# Patient Record
Sex: MALE | Race: BLACK OR AFRICAN AMERICAN | NOT HISPANIC OR LATINO | Employment: OTHER | URBAN - METROPOLITAN AREA
[De-identification: names, ages, dates, MRNs, and addresses within clinical notes are randomized per-mention and may not be internally consistent; named-entity substitution may affect disease eponyms.]

---

## 2017-01-29 ENCOUNTER — HOSPITAL ENCOUNTER (EMERGENCY)
Facility: HOSPITAL | Age: 32
Discharge: HOME | End: 2017-01-30
Attending: EMERGENCY MEDICINE
Payer: MEDICARE

## 2017-01-29 DIAGNOSIS — H57.89 IRRITATION OF LEFT EYE: Primary | ICD-10-CM

## 2017-01-29 PROCEDURE — 99283 EMERGENCY DEPT VISIT LOW MDM: CPT

## 2017-01-29 PROCEDURE — 25000003 PHARM REV CODE 250: Performed by: EMERGENCY MEDICINE

## 2017-01-29 PROCEDURE — 25000003 PHARM REV CODE 250: Performed by: PHYSICIAN ASSISTANT

## 2017-01-29 RX ORDER — DIVALPROEX SODIUM 250 MG/1
500 TABLET, FILM COATED, EXTENDED RELEASE ORAL DAILY
COMMUNITY
End: 2018-12-02

## 2017-01-29 RX ORDER — ERYTHROMYCIN 5 MG/G
OINTMENT OPHTHALMIC
Qty: 1 TUBE | Refills: 0 | Status: SHIPPED | OUTPATIENT
Start: 2017-01-29 | End: 2017-06-26

## 2017-01-29 RX ORDER — ERYTHROMYCIN 5 MG/G
OINTMENT OPHTHALMIC
Status: COMPLETED | OUTPATIENT
Start: 2017-01-30 | End: 2017-01-30

## 2017-01-29 RX ORDER — GABAPENTIN 300 MG/1
300 CAPSULE ORAL 4 TIMES DAILY
COMMUNITY
End: 2017-06-26

## 2017-01-29 RX ORDER — LISDEXAMFETAMINE DIMESYLATE 50 MG/1
50 CAPSULE ORAL EVERY MORNING
COMMUNITY
End: 2018-12-02

## 2017-01-29 RX ORDER — TETRACAINE HYDROCHLORIDE 5 MG/ML
2 SOLUTION OPHTHALMIC
Status: COMPLETED | OUTPATIENT
Start: 2017-01-29 | End: 2017-01-29

## 2017-01-29 RX ORDER — FLUOXETINE HYDROCHLORIDE 20 MG/1
20 CAPSULE ORAL DAILY
COMMUNITY
End: 2017-06-26

## 2017-01-29 RX ORDER — CETIRIZINE HYDROCHLORIDE 10 MG/1
10 TABLET, CHEWABLE ORAL DAILY
COMMUNITY
End: 2018-12-02

## 2017-01-29 RX ORDER — QUETIAPINE FUMARATE 50 MG/1
50 TABLET, FILM COATED ORAL NIGHTLY
COMMUNITY
End: 2017-06-26

## 2017-01-29 RX ORDER — HYDROCODONE BITARTRATE AND ACETAMINOPHEN 10; 325 MG/1; MG/1
TABLET ORAL
COMMUNITY
End: 2017-06-26

## 2017-01-29 RX ORDER — FLUVOXAMINE MALEATE 100 MG/1
100 TABLET, COATED ORAL NIGHTLY
COMMUNITY
End: 2017-06-26

## 2017-01-29 RX ADMIN — FLUORESCEIN SODIUM 1 STRIP: 1 STRIP OPHTHALMIC at 10:01

## 2017-01-29 RX ADMIN — TETRACAINE HYDROCHLORIDE 2 DROP: 5 SOLUTION OPHTHALMIC at 11:01

## 2017-01-29 NOTE — ED AVS SNAPSHOT
[facility]  [place] [address]               Buddy Francois   2017 10:20 PM   ED    Description:  Male : 1985   Department:  [facility]           Your Care was Coordinated By:     Provider Role From To    Miki Benito III, MD Attending Provider 17 --    Samuel Smith PA-C Physician Assistant 17 --      Reason for Visit     Foreign Body in Eye           Diagnoses this Visit        Comments    Irritation of left eye    -  Primary       ED Disposition     None           To Do List           Follow-up Information     Follow up with Navin Carvajal MD. Schedule an appointment as soon as possible for a visit in 1 day.    Specialty:  Ophthalmology    Why:  For further evaluation    Contact information:    [place] [address]  [phone]          Go to [facility].    Specialty:  Emergency Medicine    Why:  If symptoms worsen    Contact information:    [place] [id number] [phone]       These Medications        Disp Refills Start End    erythromycin (ROMYCIN) ophthalmic ointment 1 Tube 0 2017     Place a 1/2 inch ribbon of ointment into the lower eyelid 2-3 times daily.      [facility]     [facility] Care [facility] - 24/7 Assistance  Registered nurses in the [facility] provide clinical advisement, health education, appointment booking, and other advisory services.  Call for this free service at [phone].             Medications           Message regarding Medications     Verify the changes and/or additions to your medication regime listed below are the same as discussed with your clinician today.  If any of these changes or additions are incorrect, please notify your healthcare provider.        START taking these NEW medications        Refills    erythromycin (ROMYCIN) ophthalmic ointment 0     Sig: Place a 1/2 inch ribbon of ointment into the lower eyelid 2-3 times daily.    Class: Print      These medications were administered today        Dose Freq    fluorescein ophthalmic strip 1 strip 1 strip ED 1 Time    Sig: Place 1 strip into the left eye ED 1 Time.    Class: Normal    Route: Left Eye    tetracaine HCl (PF) 0.5 % Drop 2 drop 2 drop ED 1 Time    Sig: Place 2 drops into the left eye ED 1 Time.    Class: Normal    Route: Left Eye    erythromycin 5 mg/gram (0.5 %) ophthalmic ointment  ED 1 Time    Starting on: 1/30/2017    Sig: Place into the left eye ED 1 Time.    Class: Normal    Route: Left Eye           Verify that the below list of medications is an accurate representation of the medications you are currently taking.  If none reported, the list may be blank. If incorrect, please contact your healthcare provider. Carry this list with you in case of emergency.           Current Medications     cetirizine 10 mg chewable tablet Take 10 mg by mouth once daily.    divalproex ER (DEPAKOTE ER) 250 MG 24 hr tablet Take 500 mg by mouth once daily.    fluoxetine (PROZAC) 20 MG capsule Take 20 mg by mouth once daily.    fluvoxaMINE (LUVOX) 100 MG tablet Take 100 mg by mouth every evening.    gabapentin (NEURONTIN) 300 MG capsule Take 300 mg by mouth 4 (four) times daily.    hydrocodone-acetaminophen 10-325mg (NORCO)  mg Tab Take by mouth.    lisdexamfetamine (VYVANSE) 50 MG capsule Take 50 mg by mouth every morning.    quetiapine (SEROQUEL) 50 MG tablet Take 50 mg by mouth every evening.    trazodone (DESYREL) 50 MG tablet Take 50 mg by mouth every evening.    erythromycin (ROMYCIN) ophthalmic ointment Place a 1/2 inch ribbon of ointment into the lower eyelid 2-3 times daily.    erythromycin 5 mg/gram (0.5 %) ophthalmic ointment Starting on Jan 30, 2017. Place into the left eye ED 1 Time.    meloxicam (MOBIC) 7.5 MG tablet Take 7.5 mg by mouth once daily.           Clinical Reference Information     "       Your Vitals Were     BP Pulse Temp Resp Height Weight    157/67 95 98.5 °F (36.9 °C) (Oral) 18 6' 1" (1.854 m) 95.3 kg (210 lb)    SpO2 BMI             97% 27.71 kg/m2         Allergies as of 1/29/2017        Reactions    Flexeril [Cyclobenzaprine] Hives      Immunizations Administered on Date of Encounter - 1/29/2017     None      ED Micro, Lab, POCT     None      ED Imaging Orders     None      Discharge References/Attachments     CONJUNCTIVAL FOREIGN BODY, RESOLVED (ENGLISH)      [facility] Sign-Up     Activating your [facility] account is as easy as 1-2-3!     1) Visit [email or web address], select Sign Up Now, enter this activation code and your date of birth, then select Next.  [email or web address]  Expires: 3/15/2017 11:53 PM      2) Create a username and password to use when you visit [facility] in the future and select a security question in case you lose your password and select Next.    3) Enter your e-mail address and click Sign Up!    Additional Information  If you have questions, please e-mail [email or web address] or call [phone] to talk to our [facility] staff. Remember, [facility] is NOT to be used for urgent needs. For medical emergencies, dial 911.         Smoking Cessation     If you would like to quit smoking:   You may be eligible for free services if you are a [place] resident and started smoking cigarettes before September 1, 1988.  Call the [employer]oking [employer]ess[employer] ([employer]) toll free at (910) 358-0668 or ([phone] 759-0105.   Call 4-796-QUIT-NOW if you do not meet the above criteria.             [facility] complies with applicable Federal civil rights laws and does not discriminate on the basis of race, color, national origin, age, disability, or sex.        Language Assistance Services     ATTENTION: Language assistance services are available, free of charge. Please call [phone].      ATENCIÓN: Si habla español, tiene a gambino disposición servicios gratuitos de asistencia " lingüística. [facility] [phone].     RONAK Ý: N?u b?n nói Ti?ng Vi?t, có các d?ch v? h? tr? ngôn ng? mi?n phí dành cho b?n. G?i s? [phone].

## 2017-01-30 VITALS
OXYGEN SATURATION: 97 % | BODY MASS INDEX: 27.83 KG/M2 | WEIGHT: 210 LBS | SYSTOLIC BLOOD PRESSURE: 132 MMHG | HEART RATE: 77 BPM | TEMPERATURE: 98 F | RESPIRATION RATE: 16 BRPM | DIASTOLIC BLOOD PRESSURE: 74 MMHG | HEIGHT: 73 IN

## 2017-01-30 PROCEDURE — 25000003 PHARM REV CODE 250: Performed by: PHYSICIAN ASSISTANT

## 2017-01-30 RX ADMIN — ERYTHROMYCIN 0.5 INCH: 5 OINTMENT OPHTHALMIC at 12:01

## 2017-01-30 NOTE — ED PROVIDER NOTES
"Encounter Date: 1/29/2017    SCRIBE #1 NOTE: I, Parth Dodge, am scribing for, and in the presence of,  ROBYN Bustamante. I have scribed the following portions of the note - Other sections scribed: ROS, HPI.       History     Chief Complaint   Patient presents with    Foreign Body in Eye     x 3 days feels like something is in left eye     Review of patient's allergies indicates:   Allergen Reactions    Flexeril [cyclobenzaprine] Hives     HPI Comments: CC: Eye pain    HPI: This 31 y.o. M, who has a past medical history of ADHD (attention deficit hyperactivity disorder); Anxiety; Chronic back pain; Depression; Insomnia; Migraine headache; OCD (obsessive compulsive disorder); and Seasonal allergies, presents to the ED for evaluation of left eye pain since Friday. Symptoms were worse Saturday with swelling around the eye and clear drainage. He recalls a "piece of dust" entering his eye Friday. He has associated pain to the bottom eye lid with blinking. He notes associated photophobia, blurry vision and left sided headache. He treated with visine drops. No hx of eye issues. He denies nausea, vomiting, diarrhea, chest pain, shortness of breath, sore throat, rhinorrhea and cough. Doesn't wear contacts.     The history is provided by the patient.     Past Medical History   Diagnosis Date    ADHD (attention deficit hyperactivity disorder)     Anxiety     Chronic back pain     Depression     Insomnia     Migraine headache     OCD (obsessive compulsive disorder)     Seasonal allergies      No past medical history pertinent negatives.  Past Surgical History   Procedure Laterality Date    Knee arthroscopy      Patella realignment      Circumcision       Family History   Problem Relation Age of Onset    Hypertension Father     Hypertension Paternal Grandfather      Social History   Substance Use Topics    Smoking status: Former Smoker    Smokeless tobacco: Never Used    Alcohol use Yes      Comment: social "     Review of Systems   Constitutional: Negative for fever.   HENT: Negative for sore throat.    Eyes: Positive for photophobia, pain (left), discharge, redness and visual disturbance.   Respiratory: Negative for cough and shortness of breath.    Cardiovascular: Negative for chest pain.   Gastrointestinal: Negative for abdominal pain, nausea and vomiting.   Genitourinary: Negative for dysuria.   Musculoskeletal: Negative for neck pain.   Skin: Negative for rash and wound.   Neurological: Positive for headaches (left sided). Negative for dizziness.       Physical Exam   Initial Vitals   BP Pulse Resp Temp SpO2   01/29/17 2104 01/29/17 2104 01/29/17 2104 01/29/17 2104 01/29/17 2104   157/67 95 18 98.5 °F (36.9 °C) 97 %     Physical Exam    Nursing note and vitals reviewed.  Constitutional: He appears well-developed and well-nourished. He is not diaphoretic. No distress.   HENT:   Head: Normocephalic and atraumatic.   Nose: Nose normal.   Eyes:   Visual Acuity:  R 20/20; L 20/40; Both 20/20.     No contact lenses noted. No periorbital ecchymosis, lacerations, abrasions, warmth, erythema, or tenderness to palpation. Clear tearing. Able to fully open eyelids with extraocular movements intact in all directions. Pupils equal and reactive to light with direct and consensual light reflexes- no photophobia with consensual, but present with direct. Negative Cheyanne's sign with fluorescein staining. Also, no evidence of corneal abrasion, conjunctival abrasion, or dendritic lesion. No foreign body. No hyphema or subconjunctival hemorrhage. Prominent conjunctival injection, but no ciliary flush.   Neck: Normal range of motion. No tracheal deviation present. No JVD present.   Cardiovascular: Normal rate, regular rhythm and normal heart sounds. Exam reveals no friction rub.    No murmur heard.  Pulmonary/Chest: Breath sounds normal. No stridor. No respiratory distress. He has no wheezes. He has no rhonchi. He has no rales. He  exhibits no tenderness.   Musculoskeletal: Normal range of motion.   Neurological: He is alert and oriented to person, place, and time.   Skin: Skin is warm and dry. No rash and no abscess noted. No erythema. No pallor.         ED Course   Procedures  Labs Reviewed - No data to display          Medical Decision Making:   History:   Old Medical Records: I decided to obtain old medical records.      This is an emergent evaluation of a 31 y.o. male with no pertinent PMHx presenting to the ED for eye pain. Denies use of contact lenses, trauma, and fever. /67, vitals otherwise WNL, afebrile. Patient is non-toxic appearing and in no acute distress. No significant decrease in vision based on visual acuity. I suspect patient had dust particulate or other foreign body in the eye that has since been removed, causing general irritation of the eye. No evidence of occular or periocular trauma to suggest orbital compartment syndrome. No Cheyanne's sign or teardrop pupil to suggest open globe injury. No hyphema or subconjunctival hemorrhage. No blepharospasm, ischemia of the conjunctiva, or Hx to suggest ocular chemical exposure. No evidence of corneal or conjunctival abrasion or foreign body currently. No ciliary flush or pain with consensual light reflex to suggest iritis. No evidence of dendritic lesions. I doubt acute angle closure glaucoma. I doubt bacterial conjunctivitis but will cover empirically. No periorbital swelling, warmth, erythema, or tenderness to palpation to suggest periorbital cellulitis.     Patient given erythromycin ointment in ED. Discharged home with erythromycin ointment. Instructed to follow up with Dr. Carvajal for further evaluation and management of symptoms.     I discussed with the patient the diagnosis, treatment plan, indications for return to the emergency department, and for expected follow-up. The patient verbalized an understanding. The patient is asked if there are any questions or  concerns. We discuss the case, until all issues are addressed to the patients satisfaction. Patient understands and is agreeable to the plan.     I discussed this patient with Dr. Benito who is in agreement with my assessment and plan.          Scribe Attestation:   Scribe #1: I performed the above scribed service and the documentation accurately describes the services I performed. I attest to the accuracy of the note.    Attending Attestation:     Physician Attestation Statement for NP/PA:   I discussed this assessment and plan of this patient with the NP/PA, but I did not personally examine the patient. The face to face encounter was performed by the NP/PA.    Other NP/PA Attestation Additions:      Medical Decision Makin-year-old male presents for evaluation of left eye pain.  Physical examination does not reveal evidence of corneal abrasion, foreign body, significant vision abnormality, iritis, or ruptured globe. Agree with above assessment and plan.       Physician Attestation for Scribe:  Physician Attestation Statement for Scribe #1: I, ROBYN Bustamante, reviewed documentation, as scribed by Parth Dodge in my presence, and it is both accurate and complete.                 ED Course     Clinical Impression:   The encounter diagnosis was Irritation of left eye.    Disposition:   Disposition: Discharged  Condition: Stable       Samuel Smith PA-C  17 0100       Miki Benito III, MD  17 0906

## 2017-01-30 NOTE — ED TRIAGE NOTES
"Pt complains of left eye pain for 2 days. Yesterday got red and started eye drops. He reports he was outside on Friday and dust blew in his eye. Last night started draining and today for last 2 hours his eye is burning and light sensitive. No fevers. Complains of "bad headache" right now.  "

## 2017-06-26 ENCOUNTER — HOSPITAL ENCOUNTER (EMERGENCY)
Facility: HOSPITAL | Age: 32
Discharge: HOME | End: 2017-06-26
Attending: EMERGENCY MEDICINE
Payer: MEDICARE

## 2017-06-26 VITALS
BODY MASS INDEX: 28.49 KG/M2 | HEART RATE: 100 BPM | SYSTOLIC BLOOD PRESSURE: 140 MMHG | WEIGHT: 215 LBS | RESPIRATION RATE: 18 BRPM | OXYGEN SATURATION: 100 % | TEMPERATURE: 99 F | HEIGHT: 73 IN | DIASTOLIC BLOOD PRESSURE: 87 MMHG

## 2017-06-26 DIAGNOSIS — L02.01 FACIAL ABSCESS: Primary | ICD-10-CM

## 2017-06-26 DIAGNOSIS — L03.90 CELLULITIS, UNSPECIFIED CELLULITIS SITE: ICD-10-CM

## 2017-06-26 PROCEDURE — 25000003 PHARM REV CODE 250: Performed by: EMERGENCY MEDICINE

## 2017-06-26 PROCEDURE — 99283 EMERGENCY DEPT VISIT LOW MDM: CPT | Mod: 25

## 2017-06-26 PROCEDURE — 10060 I&D ABSCESS SIMPLE/SINGLE: CPT

## 2017-06-26 RX ORDER — KETOROLAC TROMETHAMINE 10 MG/1
10 TABLET, FILM COATED ORAL
Status: COMPLETED | OUTPATIENT
Start: 2017-06-26 | End: 2017-06-26

## 2017-06-26 RX ORDER — SULFAMETHOXAZOLE AND TRIMETHOPRIM 800; 160 MG/1; MG/1
1 TABLET ORAL
Status: COMPLETED | OUTPATIENT
Start: 2017-06-26 | End: 2017-06-26

## 2017-06-26 RX ORDER — SULFAMETHOXAZOLE AND TRIMETHOPRIM 800; 160 MG/1; MG/1
1 TABLET ORAL 2 TIMES DAILY
Qty: 14 TABLET | Refills: 0 | Status: SHIPPED | OUTPATIENT
Start: 2017-06-26 | End: 2017-07-03

## 2017-06-26 RX ORDER — HYDROCODONE BITARTRATE AND ACETAMINOPHEN 10; 325 MG/1; MG/1
1 TABLET ORAL EVERY 6 HOURS PRN
Qty: 10 TABLET | Refills: 0 | Status: SHIPPED | OUTPATIENT
Start: 2017-06-26 | End: 2018-12-02

## 2017-06-26 RX ORDER — LIDOCAINE HYDROCHLORIDE 20 MG/ML
5 INJECTION, SOLUTION INFILTRATION; PERINEURAL
Status: COMPLETED | OUTPATIENT
Start: 2017-06-26 | End: 2017-06-26

## 2017-06-26 RX ORDER — ONDANSETRON 4 MG/1
4 TABLET, ORALLY DISINTEGRATING ORAL
Status: COMPLETED | OUTPATIENT
Start: 2017-06-26 | End: 2017-06-26

## 2017-06-26 RX ORDER — HYDROCODONE BITARTRATE AND ACETAMINOPHEN 5; 325 MG/1; MG/1
2 TABLET ORAL
Status: COMPLETED | OUTPATIENT
Start: 2017-06-26 | End: 2017-06-26

## 2017-06-26 RX ADMIN — KETOROLAC TROMETHAMINE 10 MG: 10 TABLET, FILM COATED ORAL at 03:06

## 2017-06-26 RX ADMIN — BACITRACIN ZINC, NEOMYCIN SULFATE, POLYMYXIN B SULFATE 1 EACH: 3.5; 5000; 4 OINTMENT TOPICAL at 03:06

## 2017-06-26 RX ADMIN — HYDROCODONE BITARTRATE AND ACETAMINOPHEN 2 TABLET: 5; 325 TABLET ORAL at 02:06

## 2017-06-26 RX ADMIN — SULFAMETHOXAZOLE AND TRIMETHOPRIM 1 TABLET: 800; 160 TABLET ORAL at 02:06

## 2017-06-26 RX ADMIN — ONDANSETRON 4 MG: 4 TABLET, ORALLY DISINTEGRATING ORAL at 02:06

## 2017-06-26 RX ADMIN — LIDOCAINE HYDROCHLORIDE 5 ML: 20 INJECTION, SOLUTION INFILTRATION; PERINEURAL at 02:06

## 2017-06-26 NOTE — DISCHARGE INSTRUCTIONS
Pull the packing from your wound in about 24 hours. Keep the site clean and dry.    Chlorhexadine soap (Hibiclens) is sold at most pharmacies. Wash with this soap about twice a week to prevent recurrent abscesses.    Return to the ED for increasing pain or swelling or other issues.

## 2017-06-26 NOTE — ED PROVIDER NOTES
"Encounter Date: 6/26/2017    SCRIBE #1 NOTE: I, Luis Ventura, am scribing for, and in the presence of,  Brittney Sheehan MD. I have scribed the entire note. Other sections scribed: HPI, ROS, and PE.       History     Chief Complaint   Patient presents with    Abscess     "I believe I have an abscess, I been talking like this for the past few days." Swelling noted to R side of face.     CC: Abscess    HPI: This 32 y.o. Male presents to the ED for emergent evaluation of acute R facial swelling and pain x 2 days. Pt describes constant, severe (9/10) pain and swelling on his R mandible. Pt reports associated chills and a constant mild headache which began yesterday. Pt reports that his sister saw an ingrown hair on his R mandible area and pulled it out around 2 days ago, releasing some blood and pus. Pt denies dental pain, rashes, nausea, recent antibiotic use and other associated symptoms. Pt's pain is exacerbated when opening his mouth. No prior tx.       PMH: chronic back pain, chronic L knee pain, and migraine headache      The history is provided by the patient. No  was used.     Review of patient's allergies indicates:   Allergen Reactions    Flexeril [cyclobenzaprine] Hives     Past Medical History:   Diagnosis Date    ADHD (attention deficit hyperactivity disorder)     Anxiety     Chronic back pain     Depression     Insomnia     Migraine headache     OCD (obsessive compulsive disorder)     Seasonal allergies      Past Surgical History:   Procedure Laterality Date    CIRCUMCISION      KNEE ARTHROSCOPY      PATELLA REALIGNMENT       Family History   Problem Relation Age of Onset    Hypertension Father     Hypertension Paternal Grandfather      Social History   Substance Use Topics    Smoking status: Former Smoker    Smokeless tobacco: Never Used    Alcohol use Yes      Comment: social     Review of Systems   Constitutional: Positive for chills. Negative for fever.   HENT: " Positive for facial swelling (R). Negative for dental problem and sore throat.         (+) outer R mandibular abscess   Eyes: Negative for visual disturbance.   Respiratory: Negative for shortness of breath.    Cardiovascular: Negative for chest pain.   Gastrointestinal: Negative for nausea.   Genitourinary: Negative for dysuria.   Musculoskeletal: Negative for back pain and neck stiffness.   Skin: Negative for rash.   Neurological: Positive for headaches (mild). Negative for weakness.   Hematological: Does not bruise/bleed easily.       Physical Exam     Initial Vitals [06/26/17 0059]   BP Pulse Resp Temp SpO2   (!) 142/74 99 18 98.4 °F (36.9 °C) 97 %      MAP       96.67         Physical Exam    Nursing note and vitals reviewed.  Constitutional: He appears well-developed and well-nourished. He is not diaphoretic. No distress.   Nontoxic, awake alert   HENT:   Head: Normocephalic and atraumatic.   Mouth/Throat: Oropharynx is clear and moist.   Right mandibular area of induration, about 1 cm in diameter, that appears consistent with an abscess.  No active drainage.  No intraoral lesions.  No dental tenderness.  No significant carious disease.   Eyes: Conjunctivae and EOM are normal. Pupils are equal, round, and reactive to light.   Neck: Normal range of motion. Neck supple.   Cardiovascular: Normal rate, regular rhythm and normal heart sounds.   Pulmonary/Chest: No respiratory distress. He has no wheezes. He has no rhonchi. He has no rales.   Abdominal: Soft. There is no tenderness.   Musculoskeletal: Normal range of motion.   Neurological: He is alert and oriented to person, place, and time. He has normal strength.   Skin: Skin is warm and dry.   Psychiatric: He has a normal mood and affect.         ED Course   I & D - Incision and Drainage  Date/Time: 6/26/2017 2:30 AM  Performed by: RODRÍGUEZ CHAUHAN  Authorized by: RODRÍGUEZ CHAUHAN   Type: abscess  Body area: head/neck  Location details: face  Anesthesia:  local infiltration    Anesthesia:  Local Anesthetic: lidocaine 2% without epinephrine  Anesthetic total: 5 mL  Scalpel size: 11  Incision type: single straight  Complexity: simple  Drainage: pus  Drainage amount: scant  Wound treatment: wound packed  Packing material: 1/2 in gauze  Patient tolerance: Patient tolerated the procedure well with no immediate complications        Labs Reviewed - No data to display          Medical Decision Making:   History:   Old Medical Records: I decided to obtain old medical records.  Old Records Summarized: records from previous admission(s).  Initial Assessment:   This is an emergent evaluation of a 32-year-old male with right jaw pain and swelling ×2 days.  Patient states he had an ingrown hair to the site of his injury prior to the onset of swelling and redness.  Differential Diagnosis:   Differential includes odontogenic abscess, cutaneous abscess, cellulitis, airway compromise, other.  ED Management:  The patient is nontoxic appearing.  His exam is consistent with cutaneous abscess of right face overlying mandible.  This was I&D'd by me.  I packed the site.  I started the patient on Bactrim for antibiotic coverage given the surrounding cellulitis.  I have prescribed vicodin PRN pain.  The patient will remove packing in 24 hours.  He will return for worsening pain, swelling, fevers, or other new concerns.            Scribe Attestation:   Scribe #1: I performed the above scribed service and the documentation accurately describes the services I performed. I attest to the accuracy of the note.    Attending Attestation:           Physician Attestation for Scribe:  Physician Attestation Statement for Scribe #1: I, Brittney Sheehan MD, reviewed documentation, as scribed by Luis Ventura in my presence, and it is both accurate and complete.                 ED Course     Clinical Impression:   The primary encounter diagnosis was Facial abscess. A diagnosis of Cellulitis, unspecified  cellulitis site was also pertinent to this visit.                           Brittney Sheehan MD  06/26/17 7130

## 2017-06-26 NOTE — ED NOTES
Pt made aware that MD is willing to give morphine to help with pain since sister will be driving patient home. Pt refused morphine; states that he thinks he is shaking because he is cold and the pain hurts but will just take the medication that is prescribed to him. MD made aware.

## 2017-06-26 NOTE — ED TRIAGE NOTES
Pt states that his sister pulled an ingrown hair 2 days ago and pus came out, she pushed more pus out; since then pain is worse and swelling getting worse. Pt states he took 2 tylenols around 7pm.

## 2017-06-27 ENCOUNTER — HOSPITAL ENCOUNTER (EMERGENCY)
Facility: HOSPITAL | Age: 32
Discharge: HOME | End: 2017-06-27
Attending: EMERGENCY MEDICINE
Payer: MEDICARE

## 2017-06-27 VITALS
WEIGHT: 215 LBS | TEMPERATURE: 100 F | DIASTOLIC BLOOD PRESSURE: 64 MMHG | HEIGHT: 73 IN | HEART RATE: 73 BPM | BODY MASS INDEX: 28.49 KG/M2 | OXYGEN SATURATION: 97 % | RESPIRATION RATE: 20 BRPM | SYSTOLIC BLOOD PRESSURE: 135 MMHG

## 2017-06-27 DIAGNOSIS — Z51.89 WOUND CHECK, ABSCESS: Primary | ICD-10-CM

## 2017-06-27 PROCEDURE — 99281 EMR DPT VST MAYX REQ PHY/QHP: CPT

## 2017-06-27 NOTE — ED TRIAGE NOTES
"Had abscess.  Was I&D x 2 days ago here.  Was told to remove packing 24 hours.  States he pulled some out but thinks there is more in there.  States still in severe pain.  "I think it's infected"  Currently taking Bactrim DS.  "

## 2017-06-27 NOTE — ED PROVIDER NOTES
Encounter Date: 6/27/2017    SCRIBE #1 NOTE: I, Anthony Sandoval, am scribing for, and in the presence of,  Rob Katz MD. I have scribed the following portions of the note - Other sections scribed: HPI and ROS.       History     Chief Complaint   Patient presents with    Wound Check     Sts here to get packing removed from abscess.     CC: Wound Check    HPI: This 32 y.o. M with a PMHx of chronic back pain, chronic L knee pain, migraine, and headaches presents to the ED c/o R jaw swelling. Pt was last seen in ED on 06/26/2017 and had the wound incised, drained, and packed with gauze. Pt reports having a concern for the swelling and that some of the gauze was left inside the abscess. Pt also reports having a headache.  Pt notes taking the prescribed norco with little relief. Pt denies fever, chills, nausea, fever, and SOB. Pt reports no other symptoms.       The history is provided by the patient. No  was used.     Review of patient's allergies indicates:   Allergen Reactions    Flexeril [cyclobenzaprine] Hives     Past Medical History:   Diagnosis Date    ADHD (attention deficit hyperactivity disorder)     Anxiety     Chronic back pain     Depression     Insomnia     Migraine headache     OCD (obsessive compulsive disorder)     Seasonal allergies      Past Surgical History:   Procedure Laterality Date    CIRCUMCISION      KNEE ARTHROSCOPY      PATELLA REALIGNMENT       Family History   Problem Relation Age of Onset    Hypertension Father     Hypertension Paternal Grandfather      Social History   Substance Use Topics    Smoking status: Former Smoker    Smokeless tobacco: Never Used    Alcohol use Yes      Comment: social     Review of Systems   Constitutional: Negative for chills and fever.   HENT: Negative for sore throat.         (+) R Jaw swelling associated with abscess    Respiratory: Negative for cough and shortness of breath.    Cardiovascular: Negative for chest pain.    Gastrointestinal: Negative for nausea.   Genitourinary: Negative for dysuria.   Musculoskeletal: Negative for back pain.   Skin: Negative for rash.        (+) Pt has incision site for I and D to R side jaw   Neurological: Positive for headaches. Negative for weakness.       Physical Exam     Initial Vitals [06/27/17 1131]   BP Pulse Resp Temp SpO2   135/64 73 20 99.5 °F (37.5 °C) 97 %      MAP       87.67         Physical Exam    Nursing note and vitals reviewed.  HENT:   Mouth/Throat: Oropharynx is clear and moist.   Eyes: Conjunctivae and EOM are normal.   Neurological: He is alert and oriented to person, place, and time.   Skin:   Mild tenderness to the skin overlying the right mandible.  Minimal erythema.  Mild swelling.  Mild purulent drainage from wound         ED Course   Procedures  Labs Reviewed - No data to display          Medical Decision Making:   Initial Assessment:   32-year-old male presenting for wound check.  Patient had abscess drained approximately within the last 24-36 hours.  Packing was removed by patient.  Wound continues to drain.  Already on Bactrim.  Mild swelling noted to the face.  Recommend continued warm compresses and antibiotics.  I think this patient is safe and stable for discharge.  Primary care follow-up.  Return instructions given.  Patient verbalized understanding and agreement with the plan.            Scribe Attestation:   Scribe #1: I performed the above scribed service and the documentation accurately describes the services I performed. I attest to the accuracy of the note.    Attending Attestation:           Physician Attestation for Scribe:  Physician Attestation Statement for Scribe #1: I, Rob Katz MD, reviewed documentation, as scribed by Anthony Sandoval in my presence, and it is both accurate and complete.                 ED Course     Clinical Impression:   The encounter diagnosis was Wound check, abscess.                           Rob Katz MD  06/27/17  [id number]

## 2018-07-14 ENCOUNTER — HOSPITAL ENCOUNTER (EMERGENCY)
Facility: HOSPITAL | Age: 33
Discharge: HOME | End: 2018-07-15
Attending: EMERGENCY MEDICINE
Payer: MEDICARE

## 2018-07-14 VITALS
OXYGEN SATURATION: 98 % | DIASTOLIC BLOOD PRESSURE: 71 MMHG | WEIGHT: 220 LBS | BODY MASS INDEX: 29.16 KG/M2 | RESPIRATION RATE: 17 BRPM | TEMPERATURE: 98 F | SYSTOLIC BLOOD PRESSURE: 147 MMHG | HEART RATE: 71 BPM | HEIGHT: 73 IN

## 2018-07-14 DIAGNOSIS — S01.112A LACERATION OF LEFT EYEBROW, INITIAL ENCOUNTER: Primary | ICD-10-CM

## 2018-07-14 PROCEDURE — 99284 EMERGENCY DEPT VISIT MOD MDM: CPT | Mod: ,,, | Performed by: NURSE PRACTITIONER

## 2018-07-14 PROCEDURE — 99284 EMERGENCY DEPT VISIT MOD MDM: CPT | Mod: 25

## 2018-07-14 PROCEDURE — 25000003 PHARM REV CODE 250: Performed by: NURSE PRACTITIONER

## 2018-07-14 RX ORDER — ONDANSETRON 4 MG/1
4 TABLET, ORALLY DISINTEGRATING ORAL
Status: COMPLETED | OUTPATIENT
Start: 2018-07-14 | End: 2018-07-14

## 2018-07-14 RX ORDER — BUTALBITAL, ACETAMINOPHEN AND CAFFEINE 50; 325; 40 MG/1; MG/1; MG/1
1 TABLET ORAL
Status: COMPLETED | OUTPATIENT
Start: 2018-07-14 | End: 2018-07-14

## 2018-07-14 RX ADMIN — BUTALBITAL, ACETAMINOPHEN AND CAFFEINE 1 TABLET: 50; 325; 40 TABLET ORAL at 10:07

## 2018-07-14 RX ADMIN — ONDANSETRON 4 MG: 4 TABLET, ORALLY DISINTEGRATING ORAL at 10:07

## 2018-07-15 PROBLEM — S01.112A LACERATION OF LEFT EYEBROW: Status: ACTIVE | Noted: 2018-07-15

## 2018-07-15 NOTE — ED PROVIDER NOTES
Encounter Date: 7/14/2018       History     Chief Complaint   Patient presents with    Head Injury     Pt reports hitting left side of face yesterday on his truck door and has been hurting ever since. Swelling noted to left eyebrow.     32 y/o male which presents with laceration to his left eyebrow after he slipped and fell which caused him to hit his head on the door of his truck. He states he doesn't remember the fall and his neighbor had to help him up. He denies headaches, nausea, vomiting. He does have pain to the left eyebrow region.       The history is provided by the patient.     Review of patient's allergies indicates:   Allergen Reactions    Flexeril [cyclobenzaprine] Hives     Past Medical History:   Diagnosis Date    ADHD (attention deficit hyperactivity disorder)     Anxiety     Chronic back pain     Depression     Insomnia     Migraine headache     OCD (obsessive compulsive disorder)     Seasonal allergies      Past Surgical History:   Procedure Laterality Date    CIRCUMCISION      KNEE ARTHROSCOPY      PATELLA REALIGNMENT       Family History   Problem Relation Age of Onset    Hypertension Father     Hypertension Paternal Grandfather      Social History   Substance Use Topics    Smoking status: Former Smoker    Smokeless tobacco: Never Used    Alcohol use Yes      Comment: social     Review of Systems   Constitutional: Negative.  Negative for activity change, chills and fever.   Eyes: Negative.  Negative for pain and discharge.   Respiratory: Negative.  Negative for chest tightness and shortness of breath.    Cardiovascular: Negative.  Negative for chest pain.   Skin: Positive for wound.   Neurological: Negative.  Negative for dizziness, tremors, seizures, syncope, facial asymmetry, speech difficulty, weakness, light-headedness, numbness and headaches.       Physical Exam     Initial Vitals [07/14/18 2126]   BP Pulse Resp Temp SpO2   (!) 147/71 71 17 98.1 °F (36.7 °C) 98 %      MAP        --         Physical Exam    Nursing note and vitals reviewed.  Constitutional: He appears well-developed and well-nourished.   HENT:   Head: Normocephalic.       Right Ear: External ear normal.   Left Ear: External ear normal.   Nose: Nose normal.   Mouth/Throat: Oropharynx is clear and moist.   Eyes: Conjunctivae and EOM are normal. Pupils are equal, round, and reactive to light. Right eye exhibits no discharge. Left eye exhibits no discharge.   Neck: Normal range of motion.   Cardiovascular: Normal rate, regular rhythm, normal heart sounds and intact distal pulses. Exam reveals no gallop and no friction rub.    No murmur heard.  Pulmonary/Chest: Breath sounds normal. No respiratory distress. He has no wheezes. He has no rhonchi. He has no rales. He exhibits no tenderness.   Abdominal: Soft. Bowel sounds are normal.   Musculoskeletal: Normal range of motion. He exhibits no edema or tenderness.   Neurological: He is alert and oriented to person, place, and time. He has normal strength. He displays no atrophy and no tremor. No cranial nerve deficit or sensory deficit. He exhibits normal muscle tone. He displays a negative Romberg sign. He displays no seizure activity. GCS eye subscore is 4. GCS verbal subscore is 5. GCS motor subscore is 6.   Skin: Skin is warm.         ED Course   Procedures  Labs Reviewed - No data to display       Imaging Results          CT Head Without Contrast (Final result)  Result time 07/14/18 23:47:00    Final result by Jet Carrasco MD (07/14/18 23:47:00)                 Impression:      1. No acute intracranial abnormality.    2.  Left supraorbital soft tissue injury.  No acute facial fracture.      Electronically signed by: Jet Carrasco MD  Date:    07/14/2018  Time:    23:47             Narrative:    EXAMINATION:  CT HEAD WITHOUT CONTRAST; CT MAXILLOFACIAL WITHOUT CONTRAST    CLINICAL HISTORY:  Headache, nausea, loss of consciousness, trauma, orbital pain.    TECHNIQUE:  CT  images of the head and maxillofacial bones without contrast.  Coronal and sagittal reconstructions were created.    COMPARISON:  None.    FINDINGS:  No evidence of acute territorial infarct, hemorrhage, mass effect, or midline shift.    Stable mild asymmetry of the lateral ventricles, right side larger than the left, likely a normal variant.    No extra-axial hemorrhage or mass.    No displaced calvarial fracture.    Mild left supraorbital soft tissue injury.  No evidence of acute facial fracture.  Globes are symmetric.  Retrobulbar fat is preserved.    Minimal mucosal thickening in the ethmoid air cells.  Otherwise, the paranasal sinuses and mastoid air cells are clear.                               CT Maxillofacial Without Contrast (Final result)  Result time 07/14/18 23:47:00    Final result by Jet Carrasco MD (07/14/18 23:47:00)                 Impression:      1. No acute intracranial abnormality.    2.  Left supraorbital soft tissue injury.  No acute facial fracture.      Electronically signed by: Jet Carrasco MD  Date:    07/14/2018  Time:    23:47             Narrative:    EXAMINATION:  CT HEAD WITHOUT CONTRAST; CT MAXILLOFACIAL WITHOUT CONTRAST    CLINICAL HISTORY:  Headache, nausea, loss of consciousness, trauma, orbital pain.    TECHNIQUE:  CT images of the head and maxillofacial bones without contrast.  Coronal and sagittal reconstructions were created.    COMPARISON:  None.    FINDINGS:  No evidence of acute territorial infarct, hemorrhage, mass effect, or midline shift.    Stable mild asymmetry of the lateral ventricles, right side larger than the left, likely a normal variant.    No extra-axial hemorrhage or mass.    No displaced calvarial fracture.    Mild left supraorbital soft tissue injury.  No evidence of acute facial fracture.  Globes are symmetric.  Retrobulbar fat is preserved.    Minimal mucosal thickening in the ethmoid air cells.  Otherwise, the paranasal sinuses and mastoid air  cells are clear.                                 Medical Decision Making:   Initial Assessment:   32 y/o male which presents with left eyebrow laceration after hitting head  Differential Diagnosis:   Orbital fracture, laceration to forehead, subdural hematoma  Clinical Tests:   Radiological Study: Ordered and Reviewed  ED Management:  Pt examined and CT head/maxillofacial ordered which were negative. Pt advised to return to ED with any concerns or new symptoms. Pt voiced understanding.                       Clinical Impression:   The encounter diagnosis was Laceration of left eyebrow, initial encounter.                             Niyah Puente, CHRISTIANO  07/15/18 0018

## 2018-07-15 NOTE — ED NOTES
"Pt reports hitting head on truck door yesterday and now having headache, dizziness, blurry vision in left eye, nausea. Pt states "I think I lost consciousness". Pt denies vomiting.     Patient identifiers verified and correct for Buddy Francois.    LOC: The patient is awake, alert and aware of environment with an appropriate affect, the patient is oriented x 3 and speaking appropriately.  APPEARANCE: Patient resting comfortably and in no acute distress, patient is clean and well groomed, patient's clothing is properly fastened.  SKIN: The skin is warm and dry, color consistent with ethnicity, patient has normal skin turgor and moist mucus membranes, skin intact, no breakdown or bruising noted.  MUSCULOSKELETAL: Patient moving all extremities spontaneously, no obvious swelling or deformities noted.  RESPIRATORY: Airway is open and patent, respirations are spontaneous, patient has a normal effort and rate, no accessory muscle use noted  CARDIAC: Patient has a normal rate and regular rhythm, no periphreal edema noted, capillary refill < 3 seconds.  ABDOMEN: Soft and non tender to palpation, no distention noted, normoactive bowel sounds present in all four quadrants.  NEUROLOGIC: PERRL, 3mm bilaterally, eyes open spontaneously, behavior appropriate to situation, follows commands, facial expression symmetrical, bilateral hand grasp equal and even, purposeful motor response noted, normal sensation in all extremities when touched with a finger.  "

## 2018-11-28 ENCOUNTER — HOSPITAL ENCOUNTER (EMERGENCY)
Facility: HOSPITAL | Age: 33
Discharge: HOME | End: 2018-11-28
Attending: EMERGENCY MEDICINE
Payer: MEDICARE

## 2018-11-28 VITALS
DIASTOLIC BLOOD PRESSURE: 68 MMHG | HEIGHT: 73 IN | TEMPERATURE: 99 F | HEART RATE: 86 BPM | OXYGEN SATURATION: 98 % | WEIGHT: 220 LBS | SYSTOLIC BLOOD PRESSURE: 155 MMHG | BODY MASS INDEX: 29.16 KG/M2 | RESPIRATION RATE: 18 BRPM

## 2018-11-28 DIAGNOSIS — J11.1 INFLUENZA-LIKE ILLNESS: ICD-10-CM

## 2018-11-28 DIAGNOSIS — R05.9 COUGH: ICD-10-CM

## 2018-11-28 DIAGNOSIS — A87.9 VIRAL MENINGITIS, UNSPECIFIED: Primary | ICD-10-CM

## 2018-11-28 LAB
ALBUMIN SERPL BCP-MCNC: 3.9 G/DL
ALP SERPL-CCNC: 83 U/L
ALT SERPL W/O P-5'-P-CCNC: 17 U/L
ANION GAP SERPL CALC-SCNC: 9 MMOL/L
AST SERPL-CCNC: 18 U/L
BASOPHILS # BLD AUTO: 0.01 K/UL
BASOPHILS NFR BLD: 0.1 %
BILIRUB SERPL-MCNC: 0.8 MG/DL
BILIRUB UR QL STRIP: NEGATIVE
BUN SERPL-MCNC: 10 MG/DL
CALCIUM SERPL-MCNC: 9.6 MG/DL
CHLORIDE SERPL-SCNC: 99 MMOL/L
CLARITY CSF: CLEAR
CLARITY UR REFRACT.AUTO: CLEAR
CO2 SERPL-SCNC: 29 MMOL/L
COLOR CSF: COLORLESS
COLOR UR AUTO: NORMAL
CREAT SERPL-MCNC: 1 MG/DL
CTP QC/QA: YES
DIFFERENTIAL METHOD: NORMAL
EOSINOPHIL # BLD AUTO: 0.1 K/UL
EOSINOPHIL NFR BLD: 1.1 %
ERYTHROCYTE [DISTWIDTH] IN BLOOD BY AUTOMATED COUNT: 14.3 %
EST. GFR  (AFRICAN AMERICAN): >60 ML/MIN/1.73 M^2
EST. GFR  (NON AFRICAN AMERICAN): >60 ML/MIN/1.73 M^2
GLUCOSE CSF-MCNC: 61 MG/DL
GLUCOSE SERPL-MCNC: 94 MG/DL
GLUCOSE UR QL STRIP: NEGATIVE
HCT VFR BLD AUTO: 45.7 %
HGB BLD-MCNC: 15.1 G/DL
HGB UR QL STRIP: NEGATIVE
IMM GRANULOCYTES # BLD AUTO: 0.04 K/UL
IMM GRANULOCYTES NFR BLD AUTO: 0.5 %
KETONES UR QL STRIP: NEGATIVE
LEUKOCYTE ESTERASE UR QL STRIP: NEGATIVE
LYMPHOCYTES # BLD AUTO: 2.3 K/UL
LYMPHOCYTES NFR BLD: 28.6 %
LYMPHOCYTES NFR CSF MANUAL: 100 %
MCH RBC QN AUTO: 27.4 PG
MCHC RBC AUTO-ENTMCNC: 33 G/DL
MCV RBC AUTO: 83 FL
MONOCYTES # BLD AUTO: 1 K/UL
MONOCYTES NFR BLD: 12.3 %
NEUTROPHILS # BLD AUTO: 4.6 K/UL
NEUTROPHILS NFR BLD: 57.4 %
NITRITE UR QL STRIP: NEGATIVE
NRBC BLD-RTO: 0 /100 WBC
PH UR STRIP: 7 [PH] (ref 5–8)
PLATELET # BLD AUTO: 225 K/UL
PMV BLD AUTO: 10.5 FL
POC MOLECULAR INFLUENZA A AGN: NEGATIVE
POC MOLECULAR INFLUENZA B AGN: NEGATIVE
POTASSIUM SERPL-SCNC: 3.6 MMOL/L
PROT CSF-MCNC: 57 MG/DL
PROT SERPL-MCNC: 8.2 G/DL
PROT UR QL STRIP: NEGATIVE
RBC # BLD AUTO: 5.52 M/UL
RBC # CSF: 14 /CU MM
SODIUM SERPL-SCNC: 137 MMOL/L
SP GR UR STRIP: 1 (ref 1–1.03)
SPECIMEN VOL CSF: 1 ML
URN SPEC COLLECT METH UR: NORMAL
WBC # BLD AUTO: 8.1 K/UL
WBC # CSF: 2 /CU MM

## 2018-11-28 PROCEDURE — 96361 HYDRATE IV INFUSION ADD-ON: CPT | Mod: 59

## 2018-11-28 PROCEDURE — 89051 BODY FLUID CELL COUNT: CPT

## 2018-11-28 PROCEDURE — 84157 ASSAY OF PROTEIN OTHER: CPT

## 2018-11-28 PROCEDURE — 25000003 PHARM REV CODE 250: Performed by: EMERGENCY MEDICINE

## 2018-11-28 PROCEDURE — 62270 DX LMBR SPI PNXR: CPT | Mod: ,,, | Performed by: EMERGENCY MEDICINE

## 2018-11-28 PROCEDURE — 63600175 PHARM REV CODE 636 W HCPCS: Performed by: EMERGENCY MEDICINE

## 2018-11-28 PROCEDURE — 87070 CULTURE OTHR SPECIMN AEROBIC: CPT

## 2018-11-28 PROCEDURE — 87205 SMEAR GRAM STAIN: CPT

## 2018-11-28 PROCEDURE — 87529 HSV DNA AMP PROBE: CPT

## 2018-11-28 PROCEDURE — 25000003 PHARM REV CODE 250: Performed by: PHYSICIAN ASSISTANT

## 2018-11-28 PROCEDURE — 63600175 PHARM REV CODE 636 W HCPCS: Performed by: PHYSICIAN ASSISTANT

## 2018-11-28 PROCEDURE — 82945 GLUCOSE OTHER FLUID: CPT

## 2018-11-28 PROCEDURE — 99285 EMERGENCY DEPT VISIT HI MDM: CPT | Mod: 25

## 2018-11-28 PROCEDURE — 85025 COMPLETE CBC W/AUTO DIFF WBC: CPT

## 2018-11-28 PROCEDURE — 99285 EMERGENCY DEPT VISIT HI MDM: CPT | Mod: 25,,, | Performed by: EMERGENCY MEDICINE

## 2018-11-28 PROCEDURE — 96374 THER/PROPH/DIAG INJ IV PUSH: CPT

## 2018-11-28 PROCEDURE — 62270 DX LMBR SPI PNXR: CPT

## 2018-11-28 PROCEDURE — 80053 COMPREHEN METABOLIC PANEL: CPT

## 2018-11-28 PROCEDURE — 96372 THER/PROPH/DIAG INJ SC/IM: CPT

## 2018-11-28 PROCEDURE — 81003 URINALYSIS AUTO W/O SCOPE: CPT

## 2018-11-28 PROCEDURE — 99000 SPECIMEN HANDLING OFFICE-LAB: CPT

## 2018-11-28 PROCEDURE — 96375 TX/PRO/DX INJ NEW DRUG ADDON: CPT

## 2018-11-28 RX ORDER — OXYMETAZOLINE HCL 0.05 %
1 SPRAY, NON-AEROSOL (ML) NASAL
Status: COMPLETED | OUTPATIENT
Start: 2018-11-28 | End: 2018-11-28

## 2018-11-28 RX ORDER — IBUPROFEN 600 MG/1
600 TABLET ORAL EVERY 6 HOURS PRN
Qty: 20 TABLET | Refills: 0 | Status: SHIPPED | OUTPATIENT
Start: 2018-11-28 | End: 2020-04-24 | Stop reason: ALTCHOICE

## 2018-11-28 RX ORDER — ACYCLOVIR 800 MG/1
800 TABLET ORAL 4 TIMES DAILY
Qty: 28 TABLET | Refills: 0 | Status: SHIPPED | OUTPATIENT
Start: 2018-11-28 | End: 2020-05-19

## 2018-11-28 RX ORDER — METOCLOPRAMIDE HYDROCHLORIDE 5 MG/ML
10 INJECTION INTRAMUSCULAR; INTRAVENOUS
Status: COMPLETED | OUTPATIENT
Start: 2018-11-28 | End: 2018-11-28

## 2018-11-28 RX ORDER — DIPHENHYDRAMINE HYDROCHLORIDE 50 MG/ML
25 INJECTION INTRAMUSCULAR; INTRAVENOUS
Status: COMPLETED | OUTPATIENT
Start: 2018-11-28 | End: 2018-11-28

## 2018-11-28 RX ORDER — LIDOCAINE HYDROCHLORIDE AND EPINEPHRINE 10; 10 MG/ML; UG/ML
10 INJECTION, SOLUTION INFILTRATION; PERINEURAL ONCE
Status: COMPLETED | OUTPATIENT
Start: 2018-11-28 | End: 2018-11-28

## 2018-11-28 RX ORDER — KETOROLAC TROMETHAMINE 30 MG/ML
10 INJECTION, SOLUTION INTRAMUSCULAR; INTRAVENOUS
Status: COMPLETED | OUTPATIENT
Start: 2018-11-28 | End: 2018-11-28

## 2018-11-28 RX ORDER — ACETAMINOPHEN 500 MG
1000 TABLET ORAL
Status: COMPLETED | OUTPATIENT
Start: 2018-11-28 | End: 2018-11-28

## 2018-11-28 RX ORDER — DEXAMETHASONE SODIUM PHOSPHATE 4 MG/ML
8 INJECTION, SOLUTION INTRA-ARTICULAR; INTRALESIONAL; INTRAMUSCULAR; INTRAVENOUS; SOFT TISSUE
Status: COMPLETED | OUTPATIENT
Start: 2018-11-28 | End: 2018-11-28

## 2018-11-28 RX ORDER — ACYCLOVIR 800 MG/1
800 TABLET ORAL
Status: COMPLETED | OUTPATIENT
Start: 2018-11-28 | End: 2018-11-28

## 2018-11-28 RX ORDER — ONDANSETRON 4 MG/1
4 TABLET, ORALLY DISINTEGRATING ORAL
Status: COMPLETED | OUTPATIENT
Start: 2018-11-28 | End: 2018-11-28

## 2018-11-28 RX ADMIN — METOCLOPRAMIDE 10 MG: 5 INJECTION, SOLUTION INTRAMUSCULAR; INTRAVENOUS at 06:11

## 2018-11-28 RX ADMIN — OXYMETAZOLINE HYDROCHLORIDE 1 SPRAY: 5 SPRAY NASAL at 04:11

## 2018-11-28 RX ADMIN — ACETAMINOPHEN 1000 MG: 500 TABLET, FILM COATED ORAL at 01:11

## 2018-11-28 RX ADMIN — ONDANSETRON 4 MG: 4 TABLET, ORALLY DISINTEGRATING ORAL at 03:11

## 2018-11-28 RX ADMIN — LIDOCAINE HYDROCHLORIDE,EPINEPHRINE BITARTRATE 10 ML: 10; .01 INJECTION, SOLUTION INFILTRATION; PERINEURAL at 06:11

## 2018-11-28 RX ADMIN — SODIUM CHLORIDE 1000 ML: 0.9 INJECTION, SOLUTION INTRAVENOUS at 04:11

## 2018-11-28 RX ADMIN — KETOROLAC TROMETHAMINE 10 MG: 30 INJECTION, SOLUTION INTRAMUSCULAR at 01:11

## 2018-11-28 RX ADMIN — DEXAMETHASONE SODIUM PHOSPHATE 8 MG: 4 INJECTION, SOLUTION INTRAMUSCULAR; INTRAVENOUS at 04:11

## 2018-11-28 RX ADMIN — SODIUM CHLORIDE 1000 ML: 0.9 INJECTION, SOLUTION INTRAVENOUS at 03:11

## 2018-11-28 RX ADMIN — DIPHENHYDRAMINE HYDROCHLORIDE 25 MG: 50 INJECTION, SOLUTION INTRAMUSCULAR; INTRAVENOUS at 06:11

## 2018-11-28 RX ADMIN — ACYCLOVIR 800 MG: 800 TABLET ORAL at 09:11

## 2018-11-28 RX ADMIN — ACETAMINOPHEN 1000 MG: 500 TABLET, FILM COATED ORAL at 04:11

## 2018-11-28 NOTE — ED NOTES
Bed: 29  Expected date: 11/28/18  Expected time: 4:05 PM  Means of arrival:   Comments:  Consult 1/Intake

## 2018-11-28 NOTE — ED NOTES
Took over care with pt, pt AAOx4, respirations even and unlabored, no acute distress, safety precautions in place, call light in reach, will continue to monitor, no acute distress, complaints of pain.

## 2018-11-28 NOTE — ED TRIAGE NOTES
Pt presents with c/o flu like symptoms, generalized body aches, prod cough with green prod, runny nose, headache, hot/cold, did not take temp at home.  symptoms started yesterday am.

## 2018-11-28 NOTE — ED PROVIDER NOTES
Encounter Date: 11/28/2018       History     Chief Complaint   Patient presents with    URI     33-year-old with a history of ADHD, OCD, depression, anxiety, migraine presents to the ED with flu-like symptoms. Patient reports generalized body, chills, subjective fever, productive cough of light green sputum, chest tightness, headache, nausea, nasal congestion, sore throat. Patient also reports sensitivity to light and worsening headache with movement of his neck.  Symptoms began yesterday.  Patient has attempted treatment with generic brand TheraFlu with mild temporary relief.            Review of patient's allergies indicates:   Allergen Reactions    Flexeril [cyclobenzaprine] Hives     Past Medical History:   Diagnosis Date    ADHD (attention deficit hyperactivity disorder)     Anxiety     Chronic back pain     Depression     Insomnia     Migraine headache     OCD (obsessive compulsive disorder)     Seasonal allergies      Past Surgical History:   Procedure Laterality Date    CIRCUMCISION      KNEE ARTHROSCOPY      PATELLA REALIGNMENT       Family History   Problem Relation Age of Onset    Hypertension Father     Hypertension Paternal Grandfather      Social History     Tobacco Use    Smoking status: Former Smoker    Smokeless tobacco: Never Used   Substance Use Topics    Alcohol use: Yes     Comment: social    Drug use: No     Review of Systems   Constitutional: Positive for chills and fever.   HENT: Positive for congestion and sore throat.    Eyes: Positive for photophobia.   Respiratory: Positive for chest tightness. Negative for shortness of breath.    Cardiovascular: Negative for chest pain.   Gastrointestinal: Positive for nausea. Negative for abdominal pain and vomiting.   Genitourinary: Negative for dysuria.   Musculoskeletal: Positive for myalgias. Negative for back pain.   Skin: Negative for rash.   Neurological: Positive for headaches. Negative for weakness.   Hematological: Does not  bruise/bleed easily.       Physical Exam     Initial Vitals [11/28/18 1235]   BP Pulse Resp Temp SpO2   128/73 91 18 98.3 °F (36.8 °C) 99 %      MAP       --         Physical Exam    Nursing note and vitals reviewed.  Constitutional: He appears well-developed and well-nourished.  Non-toxic appearance. He appears ill. No distress.   HENT:   Head: Normocephalic and atraumatic.   Right Ear: Tympanic membrane normal.   Left Ear: Tympanic membrane normal.   Nose: Mucosal edema present.   Mouth/Throat: Posterior oropharyngeal erythema present. No posterior oropharyngeal edema.   Eyes: EOM are normal. Pupils are equal, round, and reactive to light.   Neck:   Slight limited range of motion of the neck due to pain.    Cardiovascular: Normal rate and regular rhythm. Exam reveals no gallop, no distant heart sounds and no friction rub.    No murmur heard.  Pulmonary/Chest: Effort normal and breath sounds normal. No accessory muscle usage. No tachypnea. No respiratory distress. He has no decreased breath sounds. He has no wheezes. He has no rhonchi. He has no rales.   Abdominal: He exhibits no distension.   Musculoskeletal: Normal range of motion.   Neurological: He is alert.   Skin: Skin is warm and dry. No rash noted. No pallor.   Psychiatric: He has a normal mood and affect. His behavior is normal.         ED Course   Lumbar Puncture  Date/Time: 11/28/2018 6:58 PM  Performed by: Luis Melgar MD  Authorized by: Luis Melgar MD   Consent Done: Yes  Indications: evaluation for infection    Anesthesia:  Local Anesthetic: lidocaine 1% with epinephrine  Anesthetic total: 3 mL  Preparation: Patient was prepped and draped in the usual sterile fashion.  Lumbar space: L3-L4 interspace  Patient's position: right lateral decubitus  Needle gauge: 20  Needle length: 3.5 in  Number of attempts: 2  Opening pressure: 22 cm H2O  Fluid appearance: clear  Tubes of fluid: 4  Post-procedure: site cleaned and adhesive bandage  applied  Patient tolerance: Patient tolerated the procedure well with no immediate complications        Labs Reviewed   PROTEIN, CSF - Abnormal; Notable for the following components:       Result Value    Protein, CSF 57 (*)     All other components within normal limits   CSF CELL COUNT WITH DIFFERENTIAL - Abnormal; Notable for the following components:    RBC, CSF 14 (*)     Lymphs,  (*)     All other components within normal limits   CULTURE, CSF  (INCLUDES STAIN)    Narrative:     On which sequentially labeled tube should this analysis be  performed?->2   CBC W/ AUTO DIFFERENTIAL   COMPREHENSIVE METABOLIC PANEL   URINALYSIS, REFLEX TO URINE CULTURE    Narrative:     Preferred Collection Type->Urine, Clean Catch   GLUCOSE, CSF   FREEZE AND HOLD -    HERPES SIMPLEX (HSV) BY RAPID PCR, CSF   POCT INFLUENZA A/B MOLECULAR          Imaging Results          CT Head Without Contrast (Final result)  Result time 11/28/18 17:48:44    Final result by Chris Larson MD (11/28/18 17:48:44)                 Impression:      1. No acute intracranial abnormalities.  2. Sinus disease.      Electronically signed by: Chris Larson MD  Date:    11/28/2018  Time:    17:48             Narrative:    EXAMINATION:  CT HEAD WITHOUT CONTRAST    CLINICAL HISTORY:  Headache, acute, norm neuro exam;    TECHNIQUE:  Low dose axial images were obtained through the head.  Coronal and sagittal reformations were also performed. Contrast was not administered.    COMPARISON:  07/14/2018    FINDINGS:  The brain is normally formed and exhibits normal density throughout.  There is no evidence of acute major vascular territory infarct, hemorrhage, or mass.  There is no hydrocephalus.  There are no abnormal extra-axial fluid collections.  There is mucous membrane thickening of the left maxillary sinus, and patchy opacification of the left ethmoid sinus, otherwise the visualized paranasal sinuses and mastoid air cells are clear, and there is  no evidence of calvarial fracture.  The visualized soft tissues are unremarkable.                               X-Ray Chest PA And Lateral (Final result)  Result time 18 15:47:09    Final result by Jaime Savage III, MD (18 15:47:09)                 Impression:      See above    No acute process seen.      Electronically signed by: Jaime Savage MD  Date:    2018  Time:    15:47             Narrative:    EXAMINATION:  XR CHEST PA AND LATERAL    CLINICAL HISTORY:  Cough    FINDINGS:  Heart size is normal lungs are clear.                                 Medical Decision Making:   History:   Old Medical Records: I decided to obtain old medical records.  Differential Diagnosis:   My differential diagnosis includes but is not limited to:  Influenza, pneumonia, viral syndrome, electrolyte disturbance, dehydration, meningitis    Clinical Tests:   Lab Tests: Ordered and Reviewed       APC / Resident Notes:   31-year-old male presents with flu-like symptoms since yesterday.  He is afebrile.  Vitals are within normal limits. Patient appears ill.  Patient has slightly limited range of motion of the neck due to pain. Given his history of headache, subjective fever, and photophobia, there was concern for meningitis, though I have a low suspicion for bacterial meningitis.  I discussed this concern with the patient.  We will give Tylenol and Toradol and reassess.    Upon reassessment, patient reports minimal improvement with treatment.  Patient will be transferred to ED pod for further management, evaluation with LP, and final disposition.   -Melissa Lopez PA-C  2018 5:13 PM           Attending Attestation:     Physician Attestation Statement for NP/PA:   I have conducted a face to face encounter with this patient in addition to the NP/PA, due to Medical Complexity    Other NP/PA Attestation Additions:      Medical Decision Makin yo M with pmhx OCD, ADHD, depression, migraine headaches, depression  presents with flu-like symptoms. Patient awoke yesterday morning with headache, myalgias, neck stiffness, photophobia.  No fevers.  Initial evaluation by the intake docs revealed a negative influenza test.  Patient reported persistent symptoms and was sent to the C-Pod for further investigation.  My exam was performed and addended as above.  Patient has no kit meningeal signs but does have limited range of motion of neck secondary to pain. No evidence of strep pharyngitis, otitis media.  Lungs are clear bilaterally. Chest x-ray without acute process.  CBC without leukocytosis.  Following Toradol, we administered 2 L IV fluids Tylenol, Decadron.  Will obtain a CT head and LP to rule out meningitis.    Reassessment:  Urinalysis clear.  CT head and chest x-ray clear.  Patient remains with a headache but photophobia slightly improved.  Obtained LP as above.  Awaiting results.  Administered Reglan.      Reassessment:  CSF inconsistent with bacterial meningitis.  There is a lymphocyte predominance concerning for viral meningitis.  On reassessment, patient reports feeling improved.  Given significant improvement, I do not feel he requires any further inpatient management.  Patient will be discharged on course of acyclovir.  Provided with NSAIDs.  Extensive return precautions.  The importance of close follow-up with PCP.                    Clinical Impression:   The primary encounter diagnosis was Viral meningitis, unspecified. Diagnoses of Cough and Influenza-like illness were also pertinent to this visit.                            Luis Melgar MD  11/28/18 2050

## 2018-11-28 NOTE — ED NOTES
.  Patient identifiers for Buddy Parrish 33 y.o. male checked and correct.  Chief Complaint   Patient presents with    URI     Past Medical History:   Diagnosis Date    ADHD (attention deficit hyperactivity disorder)     Anxiety     Chronic back pain     Depression     Insomnia     Migraine headache     OCD (obsessive compulsive disorder)     Seasonal allergies      Allergies reported:   Review of patient's allergies indicates:   Allergen Reactions    Flexeril [cyclobenzaprine] Hives         LOC: Patient is awake, alert, and aware of environment with an appropriate affect. Patient is oriented x 3 and speaking appropriately.  APPEARANCE: Patient resting comfortably and in no acute distress. Patient is clean and well groomed, patient's clothing is properly fastened.  HEENT: runny nose, throat irritation.   SKIN: The skin is warm and dry. Patient has normal skin turgor and moist mucus membranes. Skin is intact; no bruising or breakdown noted.  MUSKULOSKELETAL: Patient is moving all extremities well, no obvious deformities noted. Pulses intact. Generalized body aches.   RESPIRATORY: Airway is open and patent. Respirations are spontaneous and non-labored with normal effort and rate, BBS=clear, productive cough with green pghlem  CARDIAC: Patient has a normal rate and rhythm. No peripheral edema noted.   ABDOMEN: No distention noted. Bowel sounds active in all 4 quadrants. Soft and non-tender upon palpation.  NEUROLOGICAL:  PERRL. Facial expression is symmetrical. Hand grasps are equal bilaterally. Normal sensation in all extremities when touched with finger.

## 2018-11-29 NOTE — ED NOTES
Pt received discharge instructions, no complaints of pain respirations even and unlabored, no acute ditress

## 2018-11-29 NOTE — ED NOTES
Pt resting quietly on stretcher; remains on continuous cardiac and pulse ox monitoring with non-invasive blood pressure to cycle every 30 minutes.  VS stable; NSR noted. Bed locked in lowest position; side rails up and locked x 2; call light, bedside table, and personal belongings within reach.  Pt instructed to alert nurse for assistance and before attempting to get out of bed; verbalizes understanding.  Pt denies needs or complaints at this time; will continue to monitor.

## 2018-11-30 LAB
HSV1, PCR, CSF: NEGATIVE
HSV2, PCR, CSF: NEGATIVE

## 2018-12-02 ENCOUNTER — HOSPITAL ENCOUNTER (EMERGENCY)
Facility: HOSPITAL | Age: 33
Discharge: HOME | End: 2018-12-02
Attending: EMERGENCY MEDICINE
Payer: MEDICARE

## 2018-12-02 VITALS
DIASTOLIC BLOOD PRESSURE: 76 MMHG | BODY MASS INDEX: 29.16 KG/M2 | HEIGHT: 73 IN | RESPIRATION RATE: 18 BRPM | TEMPERATURE: 98 F | OXYGEN SATURATION: 98 % | SYSTOLIC BLOOD PRESSURE: 136 MMHG | WEIGHT: 220 LBS | HEART RATE: 70 BPM

## 2018-12-02 DIAGNOSIS — G44.009 CLUSTER HEADACHE, NOT INTRACTABLE, UNSPECIFIED CHRONICITY PATTERN: Primary | ICD-10-CM

## 2018-12-02 PROCEDURE — 25000003 PHARM REV CODE 250: Performed by: EMERGENCY MEDICINE

## 2018-12-02 PROCEDURE — 99284 EMERGENCY DEPT VISIT MOD MDM: CPT | Mod: ,,, | Performed by: EMERGENCY MEDICINE

## 2018-12-02 PROCEDURE — 96375 TX/PRO/DX INJ NEW DRUG ADDON: CPT

## 2018-12-02 PROCEDURE — 99284 EMERGENCY DEPT VISIT MOD MDM: CPT | Mod: 25

## 2018-12-02 PROCEDURE — 63600175 PHARM REV CODE 636 W HCPCS: Performed by: EMERGENCY MEDICINE

## 2018-12-02 PROCEDURE — 96374 THER/PROPH/DIAG INJ IV PUSH: CPT

## 2018-12-02 RX ORDER — ONDANSETRON 4 MG/1
4 TABLET, FILM COATED ORAL EVERY 6 HOURS
Qty: 12 TABLET | Refills: 0 | Status: SHIPPED | OUTPATIENT
Start: 2018-12-02 | End: 2020-04-24 | Stop reason: ALTCHOICE

## 2018-12-02 RX ORDER — BUTALBITAL, ACETAMINOPHEN AND CAFFEINE 50; 325; 40 MG/1; MG/1; MG/1
2 TABLET ORAL
Status: COMPLETED | OUTPATIENT
Start: 2018-12-02 | End: 2018-12-02

## 2018-12-02 RX ORDER — BUTALBITAL, ACETAMINOPHEN AND CAFFEINE 50; 325; 40 MG/1; MG/1; MG/1
1 TABLET ORAL EVERY 4 HOURS PRN
Qty: 16 TABLET | Refills: 0 | Status: SHIPPED | OUTPATIENT
Start: 2018-12-02 | End: 2019-01-01

## 2018-12-02 RX ORDER — METOCLOPRAMIDE HYDROCHLORIDE 5 MG/ML
10 INJECTION INTRAMUSCULAR; INTRAVENOUS
Status: COMPLETED | OUTPATIENT
Start: 2018-12-02 | End: 2018-12-02

## 2018-12-02 RX ORDER — KETOROLAC TROMETHAMINE 30 MG/ML
15 INJECTION, SOLUTION INTRAMUSCULAR; INTRAVENOUS
Status: COMPLETED | OUTPATIENT
Start: 2018-12-02 | End: 2018-12-02

## 2018-12-02 RX ADMIN — BUTALBITAL, ACETAMINOPHEN AND CAFFEINE 2 TABLET: 50; 325; 40 TABLET ORAL at 07:12

## 2018-12-02 RX ADMIN — SODIUM CHLORIDE 1000 ML: 0.9 INJECTION, SOLUTION INTRAVENOUS at 07:12

## 2018-12-02 RX ADMIN — METOCLOPRAMIDE 10 MG: 5 INJECTION, SOLUTION INTRAMUSCULAR; INTRAVENOUS at 07:12

## 2018-12-02 RX ADMIN — KETOROLAC TROMETHAMINE 15 MG: 30 INJECTION, SOLUTION INTRAMUSCULAR at 07:12

## 2018-12-03 LAB
BACTERIA CSF CULT: NO GROWTH
GRAM STN SPEC: NORMAL
GRAM STN SPEC: NORMAL

## 2018-12-03 NOTE — ED PROVIDER NOTES
Encounter Date: 12/2/2018    SCRIBE #1 NOTE: I, Karen Fang, am scribing for, and in the presence of,  Yobani Kahn MD. I have scribed the entire note.       History     Chief Complaint   Patient presents with    Headache     headache started thursday. back pain. ibuprofen not helping     Time patient was seen by the provider: 6:31 PM      The patient is a 33 y.o. male with co-morbidities including: history of psychiatric issues and migraine, who presents to the ED with a complaint of headache and back pain that started five days ago which resulted in an ED visit that day. Patient was seen in the ED with neck pain and URI symptoms and underwent lab work-up, including a lumbar puncture that revealed viral syndrome. Patient denies rhinorrhea, congestion, nausea and vomitng. Patient has taken Ibuprofen and Acyclovir as prescribed with no relief.       The history is provided by the patient.     Review of patient's allergies indicates:   Allergen Reactions    Flexeril [cyclobenzaprine] Hives     Past Medical History:   Diagnosis Date    ADHD (attention deficit hyperactivity disorder)     Anxiety     Chronic back pain     Depression     Insomnia     Migraine headache     OCD (obsessive compulsive disorder)     Seasonal allergies      Past Surgical History:   Procedure Laterality Date    CIRCUMCISION      KNEE ARTHROSCOPY      PATELLA REALIGNMENT       Family History   Problem Relation Age of Onset    Hypertension Father     Hypertension Paternal Grandfather      Social History     Tobacco Use    Smoking status: Former Smoker    Smokeless tobacco: Never Used   Substance Use Topics    Alcohol use: Yes     Comment: social    Drug use: No     Review of Systems   Constitutional: Negative for fever.   HENT: Negative for congestion, rhinorrhea and sore throat.    Eyes: Negative for visual disturbance.   Respiratory: Negative for shortness of breath.    Cardiovascular: Negative for chest pain.    Gastrointestinal: Negative for nausea and vomiting.   Genitourinary: Negative for dysuria.   Musculoskeletal: Positive for back pain.   Skin: Negative for rash.   Neurological: Positive for headaches. Negative for weakness.       Physical Exam     Initial Vitals [12/02/18 1807]   BP Pulse Resp Temp SpO2   136/76 70 18 97.9 °F (36.6 °C) 98 %      MAP       --         Physical Exam    Nursing note and vitals reviewed.  Constitutional: He appears well-developed and well-nourished. He is not diaphoretic. No distress.   HENT:   Head: Normocephalic and atraumatic.   Mouth/Throat: Mucous membranes are normal.   Eyes: EOM are normal. Pupils are equal, round, and reactive to light.   Not photophobic.   Neck: Normal range of motion. Neck supple.   No nuchal rigidity.    Cardiovascular: Normal rate and regular rhythm. Exam reveals no gallop and no friction rub.    No murmur heard.  Pulmonary/Chest: Breath sounds normal. No respiratory distress. He has no wheezes. He has no rhonchi. He has no rales. He exhibits no tenderness.   Abdominal: Soft. There is no tenderness. There is no rigidity, no rebound and no guarding.   Neurological: He is alert and oriented to person, place, and time. No cranial nerve deficit or sensory deficit.   Skin: Skin is warm and dry. No rash noted. No erythema.   Psychiatric: He has a normal mood and affect.         ED Course   Procedures  Labs Reviewed - No data to display       Imaging Results    None          Medical Decision Making:   History:   Old Medical Records: I decided to obtain old medical records.            Scribe Attestation:   Scribe #1: I performed the above scribed service and the documentation accurately describes the services I performed. I attest to the accuracy of the note.    Attending Attestation:             Attending ED Notes:   I, Dr. Yobani Ro, personally performed the services described in this documentation. All medical record entries made by the scribe were at my  direction and in my presence.  I have reviewed the chart and agree that the record reflects my personal performance and is accurate and complete. Yobani Ro MD.  7:30 PM 12/02/2018    Patient recently diagnosed with URI symptoms possibly viral meningitis given IV fluids antiemetics some medication for his headache this headache seems to be the same as prior to the lumbar puncture to unlikely that this is lumbar puncture related patient improved will advise on post lumbar puncture headache as well as some medications increase fluid intake and caffeine intake                 Clinical Impression:   The encounter diagnosis was Cluster headache, not intractable, unspecified chronicity pattern.      Disposition:   Disposition: Discharged  Condition: Stable                        Yobani Kahn MD  12/02/18 2953

## 2018-12-03 NOTE — ED TRIAGE NOTES
Presents to ER with complaint of headache since Thursday.  States that the only time his headache is relieved is when he is lying down.  Patient's name and date of birth checked and is correct.    LOC: The patient is awake, alert, and oriented to place, time, situation. Affect is appropriate.  Speech is appropriate and clear.      APPEARANCE: Patient resting comfortably, reporting palpation, light headedness,  in no acute distress.  Patient is clean and well groomed.     SKIN: The skin is warm and dry; color consistent with ethnicity.  Patient has normal skin turgor and moist mucus membranes.  Skin intact; no breakdown or bruising noted.      MUSCULOSKELETAL: Patient moving upper and lower extremities without difficulty.  Denies weakness.      RESPIRATORY: Airway is open and patent. Respirations spontaneous, even, easy, and non-labored.  Patient has a normal effort and rate.  No accessory muscle use noted. Denies cough.  BS clear.     CARDIAC:  No peripheral edema noted. No complaints of chest pain.       ABDOMEN: Soft and non tender to palpation.  No distention noted.      NEUROLOGIC: Eyes open spontaneously.  Behavior appropriate to situation.  Follows commands; facial expression symmetrical.  Purposeful motor response noted; normal sensation in all extremities.

## 2018-12-14 RX ORDER — BUTALBITAL, ACETAMINOPHEN AND CAFFEINE 50; 325; 40 MG/1; MG/1; MG/1
1 TABLET ORAL EVERY 4 HOURS PRN
Qty: 16 TABLET | Refills: 0 | Status: CANCELLED | OUTPATIENT
Start: 2018-12-14 | End: 2019-01-13

## 2018-12-17 RX ORDER — BUTALBITAL, ACETAMINOPHEN AND CAFFEINE 50; 325; 40 MG/1; MG/1; MG/1
1 TABLET ORAL EVERY 4 HOURS PRN
Qty: 16 TABLET | Refills: 0 | Status: CANCELLED | OUTPATIENT
Start: 2018-12-14 | End: 2019-01-13

## 2019-12-09 ENCOUNTER — HOSPITAL ENCOUNTER (EMERGENCY)
Facility: HOSPITAL | Age: 34
Discharge: HOME | End: 2019-12-09
Attending: EMERGENCY MEDICINE
Payer: MEDICARE

## 2019-12-09 VITALS
HEART RATE: 69 BPM | BODY MASS INDEX: 29.16 KG/M2 | RESPIRATION RATE: 18 BRPM | HEIGHT: 73 IN | WEIGHT: 220 LBS | OXYGEN SATURATION: 98 % | DIASTOLIC BLOOD PRESSURE: 73 MMHG | TEMPERATURE: 98 F | SYSTOLIC BLOOD PRESSURE: 141 MMHG

## 2019-12-09 DIAGNOSIS — M79.675 TOE PAIN, LEFT: ICD-10-CM

## 2019-12-09 DIAGNOSIS — M25.561 ACUTE PAIN OF RIGHT KNEE: Primary | ICD-10-CM

## 2019-12-09 DIAGNOSIS — M25.569 KNEE PAIN: ICD-10-CM

## 2019-12-09 PROCEDURE — 99284 EMERGENCY DEPT VISIT MOD MDM: CPT | Mod: 25

## 2019-12-09 PROCEDURE — 99284 PR EMERGENCY DEPT VISIT,LEVEL IV: ICD-10-PCS | Mod: ,,, | Performed by: EMERGENCY MEDICINE

## 2019-12-09 PROCEDURE — 99284 EMERGENCY DEPT VISIT MOD MDM: CPT | Mod: ,,, | Performed by: EMERGENCY MEDICINE

## 2019-12-09 PROCEDURE — 25000003 PHARM REV CODE 250: Performed by: PHYSICIAN ASSISTANT

## 2019-12-09 RX ORDER — IBUPROFEN 600 MG/1
600 TABLET ORAL
Status: COMPLETED | OUTPATIENT
Start: 2019-12-09 | End: 2019-12-09

## 2019-12-09 RX ORDER — ACETAMINOPHEN 500 MG
1000 TABLET ORAL
Status: COMPLETED | OUTPATIENT
Start: 2019-12-09 | End: 2019-12-09

## 2019-12-09 RX ORDER — NAPROXEN 500 MG/1
500 TABLET ORAL 2 TIMES DAILY WITH MEALS
Qty: 20 TABLET | Refills: 0 | Status: SHIPPED | OUTPATIENT
Start: 2019-12-09 | End: 2020-04-24 | Stop reason: ALTCHOICE

## 2019-12-09 RX ADMIN — ACETAMINOPHEN 1000 MG: 500 TABLET ORAL at 12:12

## 2019-12-09 RX ADMIN — IBUPROFEN 600 MG: 600 TABLET, FILM COATED ORAL at 12:12

## 2019-12-09 NOTE — ED NOTES
Upon discharge patient found to be AAOx4, respirations even and unlabored, skin warm and dry. No new complaints or apparent distress upon discharge.

## 2019-12-09 NOTE — ED PROVIDER NOTES
Encounter Date: 12/9/2019       History     Chief Complaint   Patient presents with    Toe Pain     L little toe    Knee Pain     pain behind r knee     34-year-old  male with history of anxiety presents to the ED complaining of left 5th toe pain and right knee pain. He has been having pain to the left toe for few months, was treated for tinea pedis by his PCP and is still on ketoconazole cream.  He reports the rash from the tinea has resolved but the pain in his toe has persisted.  He developed right knee pain 1 week ago.  Pain is worse with ambulation.  He reports his pain is currently 7/10, he has not taken any over-the-counter medications prior to arrival.  He denies any past surgical history to the right knee or left foot.  He denies any known direct trauma.  Denies fever, chills, chest pain, shortness breath, abdominal pain, nausea, headache, numbness.    The history is provided by the patient.     Review of patient's allergies indicates:   Allergen Reactions    Flexeril [cyclobenzaprine] Hives     Past Medical History:   Diagnosis Date    ADHD (attention deficit hyperactivity disorder)     Anxiety     Chronic back pain     Depression     Insomnia     Migraine headache     OCD (obsessive compulsive disorder)     Seasonal allergies      Past Surgical History:   Procedure Laterality Date    CIRCUMCISION      KNEE ARTHROSCOPY      PATELLA REALIGNMENT       Family History   Problem Relation Age of Onset    Hypertension Father     Hypertension Paternal Grandfather      Social History     Tobacco Use    Smoking status: Former Smoker    Smokeless tobacco: Never Used   Substance Use Topics    Alcohol use: Yes     Comment: social    Drug use: No     Review of Systems   Constitutional: Negative for chills, diaphoresis and fever.   HENT: Negative for congestion, rhinorrhea and sore throat.    Eyes: Negative for photophobia and visual disturbance.   Respiratory: Negative for cough and  shortness of breath.    Cardiovascular: Negative for chest pain.   Gastrointestinal: Negative for abdominal pain, constipation, diarrhea, nausea and vomiting.   Genitourinary: Negative for dysuria and hematuria.   Musculoskeletal: Positive for arthralgias, joint swelling and myalgias.   Skin: Negative for rash and wound.   Neurological: Negative for light-headedness, numbness and headaches.   Psychiatric/Behavioral: Negative for confusion.       Physical Exam     Initial Vitals [12/09/19 1107]   BP Pulse Resp Temp SpO2   (!) 141/73 69 18 97.8 °F (36.6 °C) 98 %      MAP       --         Physical Exam    Nursing note and vitals reviewed.  Constitutional: He appears well-developed and well-nourished. He is not diaphoretic. No distress.   HENT:   Head: Normocephalic and atraumatic.   Neck: Normal range of motion. Neck supple.   Cardiovascular: Normal rate, regular rhythm and normal heart sounds. Exam reveals no gallop and no friction rub.    No murmur heard.  Pulmonary/Chest: Breath sounds normal. He has no wheezes. He has no rhonchi. He has no rales.   Abdominal: Soft. Bowel sounds are normal. There is no tenderness. There is no rebound and no guarding.   Musculoskeletal:        Right knee: He exhibits swelling. He exhibits normal range of motion, no deformity, no laceration, no erythema and no bony tenderness.        Left foot: There is tenderness and bony tenderness.   Minimal tenderness to the posterior R knee. No erythema/warmth. Tenderness to the L 5th toe. There is white film between 4th and 5th toes which patient reports is from anti-fungal cream that he applied this morning. No laceration/maceration. L pedal pulses 2+.    Neurological: He is alert and oriented to person, place, and time.   Skin: Skin is warm and dry. No rash noted. No erythema.   Psychiatric: He has a normal mood and affect.         ED Course   Procedures  Labs Reviewed - No data to display       Imaging Results          X-Ray Foot Complete  Left (Final result)  Result time 12/09/19 11:58:21    Final result by Parth Romero MD (12/09/19 11:58:21)                 Impression:      See above      Electronically signed by: Parth Romero MD  Date:    12/09/2019  Time:    11:58             Narrative:    EXAMINATION:  XR FOOT COMPLETE 3 VIEW LEFT    CLINICAL HISTORY:  .  Pain in left toe(s)    TECHNIQUE:  AP, lateral and oblique views of the left foot were performed.    COMPARISON:  None    FINDINGS:  Phalanges metatarsals and tarsal bones are intact no bony abnormalities seen.   specifically the 5th toe appears to be normal                               X-Ray Knee 3 View Right (Final result)  Result time 12/09/19 11:57:08    Final result by Jaime Savage III, MD (12/09/19 11:57:08)                 Narrative:    EXAMINATION:  XR KNEE 3 VIEW RIGHT    CLINICAL HISTORY:  Pain in unspecified knee    FINDINGS:  There is an osteochondroma of the proximal tibia.  No fracture dislocation bone destruction seen.      Electronically signed by: Jaime Savage MD  Date:    12/09/2019  Time:    11:57                               Medical Decision Making:   History:   Old Medical Records: I decided to obtain old medical records.  Clinical Tests:   Radiological Study: Reviewed and Ordered       APC / Resident Notes:   34-year-old  male with history of anxiety presents to the ED complaining of left 5th toe pain and right knee pain. Vital signs stable. Regular rate rhythm.  Lungs are clear.  Abdomen is soft and nontender.  There is minimal swelling noted to the right knee with tenderness to the right posterior knee.  No erythema or warmth.  Normal range of motion.  There is minimal tenderness noted to the left 5th toe without any significant swelling, erythema.  There is a white film in between the 4th and 5th toe on the left foot, patient reports this is from antifungal cream that he applied early this morning.  No evidence of cellulitis,  laceration, maceration.  Left pedal pulses 2+.  Differential diagnosis includes but is not limited to fracture, dislocation, sprain, musculoskeletal pain.  Will obtain x-rays for further evaluation.    Xrays R knee and L foot independently reviewed - no fracture/dislocation.     I do not feel that he needs any further labs or imaging at this time. Stable for discharge.    He was discharged with a prescription for naproxen.  He will follow up with podiatry and orthopedics.  All of the patient's questions were answered.  I reviewed the patient's chart and imaging.                              Clinical Impression:       ICD-10-CM ICD-9-CM   1. Acute pain of right knee M25.561 719.46   2. Knee pain M25.569 719.46   3. Toe pain, left M79.675 729.5         Disposition:   Disposition: Discharged  Condition: Stable                     Litzy Mcclendon PA-C  12/09/19 7687

## 2019-12-09 NOTE — ED TRIAGE NOTES
Pt is a 34 yr old male that presents to the ED with L pinky toe pain and R knee pain. Pt states that he has had pain behind his R knee for about a week and pain to the L pinky toe for about a month following a trip and fall.

## 2019-12-09 NOTE — ED NOTES
LOC: The patient is awake, alert, and oriented to place, time, situation. Affect is appropriate.  Speech is appropriate and clear.     APPEARANCE: Patient resting comfortably in no acute distress.  Patient is clean and well groomed.    SKIN: The skin is warm and dry; color consistent with ethnicity.  Patient has normal skin turgor and moist mucus membranes.  Skin intact; no breakdown or bruising noted.     MUSCULOSKELETAL: Patient moving upper extremities without difficulty. Pt reports pain to R knee with movement. Pt reports pain to the L pinky toe.     RESPIRATORY: Airway is open and patent. Respirations spontaneous, even, easy, and non-labored.  Patient has a normal effort and rate.  No accessory muscle use noted. Denies cough.     CARDIAC:  Normal rhythm and rate noted.  No peripheral edema noted. No complaints of chest pain.      ABDOMEN: Soft and non tender to palpation.  No distention noted.     NEUROLOGIC: Eyes open spontaneously.  Behavior appropriate to situation.  Follows commands; facial expression symmetrical.  Purposeful motor response noted; normal sensation in all extremities.

## 2019-12-26 ENCOUNTER — TELEPHONE (OUTPATIENT)
Dept: PODIATRY | Facility: CLINIC | Age: 34
End: 2019-12-26

## 2019-12-26 NOTE — TELEPHONE ENCOUNTER
----- Message from Anjali Jean sent at 12/26/2019  2:54 PM CST -----  Contact: pt @ [phone]  Patient is asking to be seen sooner, says he can barely walk due to his toe, says he stepped on a nail in the past. Please call.

## 2019-12-30 ENCOUNTER — TELEPHONE (OUTPATIENT)
Dept: PODIATRY | Facility: CLINIC | Age: 34
End: 2019-12-30

## 2019-12-30 ENCOUNTER — HOSPITAL ENCOUNTER (OUTPATIENT)
Dept: RADIOLOGY | Facility: HOSPITAL | Age: 34
Discharge: HOME | End: 2019-12-30
Attending: PODIATRIST
Payer: MEDICARE

## 2019-12-30 ENCOUNTER — TELEPHONE (OUTPATIENT)
Dept: FAMILY MEDICINE | Facility: CLINIC | Age: 34
End: 2019-12-30

## 2019-12-30 ENCOUNTER — OFFICE VISIT (OUTPATIENT)
Dept: PODIATRY | Facility: CLINIC | Age: 34
End: 2019-12-30
Payer: MEDICARE

## 2019-12-30 VITALS
HEIGHT: 73 IN | SYSTOLIC BLOOD PRESSURE: 122 MMHG | WEIGHT: 220 LBS | BODY MASS INDEX: 29.16 KG/M2 | DIASTOLIC BLOOD PRESSURE: 78 MMHG

## 2019-12-30 DIAGNOSIS — L84 CORN OR CALLUS: ICD-10-CM

## 2019-12-30 DIAGNOSIS — M79.672 LEFT FOOT PAIN: Primary | ICD-10-CM

## 2019-12-30 DIAGNOSIS — M79.672 LEFT FOOT PAIN: ICD-10-CM

## 2019-12-30 PROCEDURE — 73718 MRI LOWER EXTREMITY W/O DYE: CPT | Mod: 26,LT,, | Performed by: RADIOLOGY

## 2019-12-30 PROCEDURE — 99999 PR PBB SHADOW E&M-EST. PATIENT-LVL III: ICD-10-PCS | Mod: PBBFAC,,, | Performed by: PODIATRIST

## 2019-12-30 PROCEDURE — 99203 PR OFFICE/OUTPT VISIT, NEW, LEVL III, 30-44 MIN: ICD-10-PCS | Mod: S$PBB,,, | Performed by: PODIATRIST

## 2019-12-30 PROCEDURE — 99203 OFFICE O/P NEW LOW 30 MIN: CPT | Mod: S$PBB,,, | Performed by: PODIATRIST

## 2019-12-30 PROCEDURE — 99999 PR PBB SHADOW E&M-EST. PATIENT-LVL III: CPT | Mod: PBBFAC,,, | Performed by: PODIATRIST

## 2019-12-30 PROCEDURE — 99213 OFFICE O/P EST LOW 20 MIN: CPT | Mod: PBBFAC,PO | Performed by: PODIATRIST

## 2019-12-30 PROCEDURE — 73718 MRI FOOT (FOREFOOT) LEFT WITHOUT CONTRAST: ICD-10-PCS | Mod: 26,LT,, | Performed by: RADIOLOGY

## 2019-12-30 PROCEDURE — 73718 MRI LOWER EXTREMITY W/O DYE: CPT | Mod: TC,PO,LT

## 2019-12-30 NOTE — PROGRESS NOTES
Subjective:      Patient ID: Buddy Parrish is a 34 y.o. male.    Chief Complaint: Foot Pain (left pinky toe, no PCP)    Buddy is a 34 y.o. male who presents to the podiatry clinic  with complaint of  left foot pain. Onset of the symptoms was several months ago. Precipitating event: stepped on a nail. Current symptoms include: ability to bear weight, but with some pain. Aggravating factors: any weight bearing. Symptoms have progressed to a point and plateaued. Patient has had no prior foot problems. Evaluation to date: went to ED xray ordered no fracture noted.  Treatment to date: rest. Patients rates pain 5/10 on pain scale.        Review of Systems   Constitution: Negative for chills, diaphoresis and fever.   Cardiovascular: Negative for claudication, cyanosis, leg swelling and syncope.   Respiratory: Negative for cough and shortness of breath.    Skin: Positive for color change and suspicious lesions. Negative for nail changes.   Musculoskeletal: Negative for falls, joint pain, muscle cramps and muscle weakness.   Gastrointestinal: Negative for diarrhea, nausea and vomiting.   Neurological: Negative for disturbances in coordination, numbness, paresthesias, sensory change, tremors and weakness.   Psychiatric/Behavioral: Negative for altered mental status.           Objective:      Physical Exam   Constitutional: He appears well-developed. He is cooperative.   Oriented to time, place, and person.   Cardiovascular:   DP and PT pulses are palpable bilaterally. 3 sec capillary refill time and toes and feet are warm to touch proximally .  There is  hair growth on the feet and toes b/l. There is no edema b/l. No spider veins or varicosities present b/l.      Musculoskeletal:   Equinus noted b/l ankles with < 10 deg DF noted. MMT 5/5 in DF/PF/Inv/Ev resistance with no reproduction of pain in any direction. Passive range of motion of ankle and pedal joints is painless b/l.    Pain upon palpation left 5th toe and lateral  5th met head.      Feet:   Right Foot:   Skin Integrity: Negative for callus or dry skin.   Left Foot:   Skin Integrity: Negative for callus or dry skin.   Lymphadenopathy:   Negative lymphadenopathy bilateral popliteal fossa and tarsal tunnel.   Neurological: He is alert.   Light touch, proprioception, and sharp/dull sensation are all intact bilaterally. Protective threshold with the [place]-Wienstein monofilament is intact bilaterally.    Skin:   No open lesions, lacerations or wounds noted.Interdigital spaces clean, dry and intact b/l. No erythema noted to b/l foot.  Nails normal color and trophic qualities.    Interdigital maceration left 4th interdigital space with Hyperkeratotic lesion.      Psychiatric: He has a normal mood and affect.             Assessment:       Encounter Diagnoses   Name Primary?    Left foot pain Yes    Corn or callus          Plan:       Buddy was seen today for foot pain.    Diagnoses and all orders for this visit:    Left foot pain  -     MRI Foot (Forefoot) Left Without Contrast; Future    Corn or callus      I counseled the patient on his conditions, their implications and medical management.    MRI left forefoot ordered.     Discussed conservative treatment with shoes of adequate dimensions, material, and style to alleviate symptoms and delay or prevent surgical intervention.    [employer] shoe dispensed.     With the patient's verbal consent a sterile #15 scalpel was used to trim the hyperkeratotic lesion described above. Instructed to paint with betadine apply hydrofera blue to interdigital space. Betadine and hydrofera blue given to patient.     F/u 6 weeks.

## 2019-12-30 NOTE — TELEPHONE ENCOUNTER
Contacted number on file to inform patient of MRI results. Patient's girlfriend answered the phone. Left message with her for patient to contact the office to discuss results.

## 2019-12-31 NOTE — TELEPHONE ENCOUNTER
Discussed MRI results with patient. Uric acid ordered to r/o gout. Referral placed to PT. F/u in 6 weeks.

## 2019-12-31 NOTE — TELEPHONE ENCOUNTER
Called pt to set up lab appt for uric acid labs  ----- Message from Sienna Dominguez DPM sent at 12/30/2019  6:07 PM CST -----  Hi, I ordered a uric acid - lab for the above patient. Does this need to be set up? Also could you schedule a 6 week appt for the above patient. Thanks.

## 2020-01-29 ENCOUNTER — TELEPHONE (OUTPATIENT)
Dept: PODIATRY | Facility: CLINIC | Age: 35
End: 2020-01-29

## 2020-04-24 ENCOUNTER — OFFICE VISIT (OUTPATIENT)
Dept: INTERNAL MEDICINE | Facility: CLINIC | Age: 35
End: 2020-04-24
Attending: FAMILY MEDICINE
Payer: MEDICARE

## 2020-04-24 DIAGNOSIS — M54.50 CHRONIC LOW BACK PAIN, UNSPECIFIED BACK PAIN LATERALITY, UNSPECIFIED WHETHER SCIATICA PRESENT: ICD-10-CM

## 2020-04-24 DIAGNOSIS — G89.29 CHRONIC LOW BACK PAIN, UNSPECIFIED BACK PAIN LATERALITY, UNSPECIFIED WHETHER SCIATICA PRESENT: ICD-10-CM

## 2020-04-24 DIAGNOSIS — M94.262 CHONDROMALACIA OF LEFT KNEE: ICD-10-CM

## 2020-04-24 DIAGNOSIS — M75.102 ROTATOR CUFF SYNDROME OF LEFT SHOULDER: Primary | ICD-10-CM

## 2020-04-24 DIAGNOSIS — M65.30 TRIGGER FINGER, UNSPECIFIED FINGER, UNSPECIFIED LATERALITY: ICD-10-CM

## 2020-04-24 PROCEDURE — G0463 HOSPITAL OUTPT CLINIC VISIT: HCPCS

## 2020-04-24 PROCEDURE — 99202 OFFICE O/P NEW SF 15 MIN: CPT | Mod: 95,,, | Performed by: FAMILY MEDICINE

## 2020-04-24 PROCEDURE — 99202 PR OFFICE/OUTPT VISIT, NEW, LEVL II, 15-29 MIN: ICD-10-PCS | Mod: 95,,, | Performed by: FAMILY MEDICINE

## 2020-04-24 PROCEDURE — 99211 OFF/OP EST MAY X REQ PHY/QHP: CPT

## 2020-04-24 RX ORDER — DICLOFENAC SODIUM 75 MG/1
75 TABLET, DELAYED RELEASE ORAL 2 TIMES DAILY
Qty: 60 TABLET | Refills: 0 | Status: SHIPPED | OUTPATIENT
Start: 2020-04-24 | End: 2020-05-19 | Stop reason: ALTCHOICE

## 2020-04-24 NOTE — PROGRESS NOTES
Subjective:       Patient ID: Buddy Parrish is a 35 y.o. male.    Chief Complaint: No chief complaint on file.    The patient location is:  Home  The chief complaint leading to consultation is:  Left shoulder pain times 1-2 months  Visit type: audiovisual  Total time spent with patient:  15 min  Each patient to whom he or she provides medical services by telemedicine is:  (1) informed of the relationship between the physician and patient and the respective role of any other health care provider with respect to management of the patient; and (2) notified that he or she may decline to receive medical services by telemedicine and may withdraw from such care at any time.    Notes:  New patient left shoulder pain.  No injury.  Worse overhead.  Worse at night.  No neck pain reported.  No numbness tingling or weakness.  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    Previous problems with knee and back.  No back surgery but it was recommended and his knee was done out of system.    New patient, incomplete chart.    Also noted occasional triggering in the right middle digit.    Review of Systems   Constitutional: Negative for appetite change, chills, diaphoresis, fatigue and fever.   HENT: Negative for congestion, postnasal drip, rhinorrhea, sore throat and trouble swallowing.    Eyes: Negative for visual disturbance.   Respiratory: Negative for cough, choking, chest tightness, shortness of breath and wheezing.    Cardiovascular: Negative for chest pain and leg swelling.   Gastrointestinal: Negative for abdominal distention, abdominal pain, diarrhea, nausea and vomiting.   Genitourinary: Negative for difficulty urinating.   Musculoskeletal: Positive for arthralgias. Negative for myalgias.   Skin: Negative for rash.   Neurological: Negative for weakness, light-headedness and headaches.       Objective:      Physical Exam   Constitutional: He is oriented to person, place, and time. He appears well-developed and  well-nourished. No distress.   Musculoskeletal:        Left shoulder: He exhibits tenderness. He exhibits normal range of motion.        Arms:  Neurological: He is alert and oriented to person, place, and time.   Psychiatric: He has a normal mood and affect. His speech is normal and behavior is normal.       Assessment:       1. Rotator cuff syndrome of left shoulder    2. Chronic low back pain, unspecified back pain laterality, unspecified whether sciatica present    3. Chondromalacia of left knee        Plan:     Medication List with Changes/Refills   New Medications    DICLOFENAC (VOLTAREN) 75 MG EC TABLET    Take 1 tablet (75 mg total) by mouth 2 (two) times daily.   Current Medications    ACYCLOVIR (ZOVIRAX) 800 MG TAB    Take 1 tablet (800 mg total) by mouth 4 (four) times daily. for 7 days   Discontinued Medications    IBUPROFEN (ADVIL,MOTRIN) 600 MG TABLET    Take 1 tablet (600 mg total) by mouth every 6 (six) hours as needed.    NAPROXEN (NAPROSYN) 500 MG TABLET    Take 1 tablet (500 mg total) by mouth 2 (two) times daily with meals.    ONDANSETRON (ZOFRAN) 4 MG TABLET    Take 1 tablet (4 mg total) by mouth every 6 (six) hours.     Diagnoses and all orders for this visit:    Rotator cuff syndrome of left shoulder    Chronic low back pain, unspecified back pain laterality, unspecified whether sciatica present  Comments:  2013 injury    Chondromalacia of left knee    Other orders  -     diclofenac (VOLTAREN) 75 MG EC tablet; Take 1 tablet (75 mg total) by mouth 2 (two) times daily.      See meds, orders, follow up, routing and instructions sections of encounter and AVS. Discussed with patient and provided on AVS.    I demonstrated a couple basic exercises including pendulum and overhead supported reach.  Trial of Voltaren.  GI side effects discussed.  Office visit 3 weeks for reassessment.

## 2020-04-24 NOTE — Clinical Note
Please call patient to schedule for a follow up appointment with me in 3 weeks in clinic. Thank you.

## 2020-05-19 ENCOUNTER — OFFICE VISIT (OUTPATIENT)
Dept: INTERNAL MEDICINE | Facility: CLINIC | Age: 35
End: 2020-05-19
Attending: FAMILY MEDICINE
Payer: MEDICARE

## 2020-05-19 VITALS
HEIGHT: 73 IN | BODY MASS INDEX: 28.37 KG/M2 | SYSTOLIC BLOOD PRESSURE: 112 MMHG | HEART RATE: 78 BPM | WEIGHT: 214.06 LBS | DIASTOLIC BLOOD PRESSURE: 76 MMHG | OXYGEN SATURATION: 98 %

## 2020-05-19 DIAGNOSIS — M65.30 TRIGGER FINGER, UNSPECIFIED FINGER, UNSPECIFIED LATERALITY: ICD-10-CM

## 2020-05-19 DIAGNOSIS — M75.100 ROTATOR CUFF SYNDROME, UNSPECIFIED LATERALITY: Primary | ICD-10-CM

## 2020-05-19 PROCEDURE — 20551 PR INJECT TENDON ORIGIN/INSERT: ICD-10-PCS | Mod: S$PBB,,, | Performed by: FAMILY MEDICINE

## 2020-05-19 PROCEDURE — 99214 OFFICE O/P EST MOD 30 MIN: CPT | Mod: PBBFAC | Performed by: FAMILY MEDICINE

## 2020-05-19 PROCEDURE — 99213 OFFICE O/P EST LOW 20 MIN: CPT | Mod: S$PBB,25,, | Performed by: FAMILY MEDICINE

## 2020-05-19 PROCEDURE — 20551 NJX 1 TENDON ORIGIN/INSJ: CPT | Mod: 25,PBBFAC | Performed by: FAMILY MEDICINE

## 2020-05-19 PROCEDURE — 99213 PR OFFICE/OUTPT VISIT, EST, LEVL III, 20-29 MIN: ICD-10-PCS | Mod: S$PBB,25,, | Performed by: FAMILY MEDICINE

## 2020-05-19 PROCEDURE — 99999 PR PBB SHADOW E&M-EST. PATIENT-LVL IV: CPT | Mod: PBBFAC,,, | Performed by: FAMILY MEDICINE

## 2020-05-19 PROCEDURE — 20551 NJX 1 TENDON ORIGIN/INSJ: CPT | Mod: S$PBB,,, | Performed by: FAMILY MEDICINE

## 2020-05-19 PROCEDURE — 99999 PR PBB SHADOW E&M-EST. PATIENT-LVL IV: ICD-10-PCS | Mod: PBBFAC,,, | Performed by: FAMILY MEDICINE

## 2020-05-19 RX ORDER — BETAMETHASONE SODIUM PHOSPHATE AND BETAMETHASONE ACETATE 3; 3 MG/ML; MG/ML
6 INJECTION, SUSPENSION INTRA-ARTICULAR; INTRALESIONAL; INTRAMUSCULAR; SOFT TISSUE
Status: COMPLETED | OUTPATIENT
Start: 2020-05-19 | End: 2020-05-19

## 2020-05-19 RX ADMIN — BETAMETHASONE SODIUM PHOSPHATE AND BETAMETHASONE ACETATE 6 MG: 3; 3 INJECTION, SUSPENSION INTRA-ARTICULAR; INTRALESIONAL; INTRAMUSCULAR; SOFT TISSUE at 11:05

## 2020-05-19 NOTE — PROGRESS NOTES
Subjective:       Patient ID: Buddy Parrish is a 35 y.o. male.    Chief Complaint: Follow-up and Shoulder Pain (pain rate 8 (both shoulders) )    Shoulder pain, months.  This is a follow-up from a virtual visit last month.  Rather diffuse.  Worse overhead.  Worse at night.  Some neck pain on the left which seems like the supraspinatus fossa.  No numbness tingling or weakness distally in the left upper extremity.  Notes a triggering right 4th digit.  Some tingling in that hand as well with use.  Used to play football but no definitive injuries noted.    Review of Systems   Constitutional: Positive for activity change and unexpected weight change. Negative for chills, fatigue and fever.   HENT: Negative for congestion, hearing loss, rhinorrhea and trouble swallowing.    Eyes: Negative for discharge, redness and visual disturbance.   Respiratory: Negative for cough, chest tightness, shortness of breath and wheezing.    Cardiovascular: Negative for chest pain, palpitations and leg swelling.   Gastrointestinal: Negative for abdominal pain, blood in stool, constipation, diarrhea and vomiting.   Endocrine: Negative for polydipsia and polyuria.   Genitourinary: Negative for difficulty urinating, hematuria and urgency.   Musculoskeletal: Positive for arthralgias. Negative for back pain, gait problem, joint swelling, myalgias and neck pain.   Skin: Negative for color change and rash.   Neurological: Negative for tremors, speech difficulty, weakness, numbness and headaches.   Hematological: Negative for adenopathy. Does not bruise/bleed easily.   Psychiatric/Behavioral: Negative for behavioral problems, confusion, dysphoric mood and sleep disturbance. The patient is not nervous/anxious.        Objective:      Physical Exam   Constitutional: He is oriented to person, place, and time. He appears well-developed and well-nourished. No distress.   Neck: Normal range of motion and full passive range of motion without pain. Neck  supple. No thyroid mass present.   Pulmonary/Chest: Effort normal.   Musculoskeletal: He exhibits no edema.        Right shoulder: He exhibits tenderness. He exhibits normal range of motion, no bony tenderness, no swelling, no effusion, no crepitus, no deformity, no laceration, no pain, no spasm, normal pulse and normal strength.        Left shoulder: He exhibits tenderness. He exhibits normal range of motion, no bony tenderness, no swelling, no effusion, no crepitus, no deformity, no laceration, no pain, no spasm, normal pulse and normal strength.        Cervical back: He exhibits normal range of motion, no tenderness and no spasm.        Right hand: He exhibits decreased range of motion and tenderness. He exhibits normal capillary refill and no swelling. Normal sensation noted. Decreased sensation is not present in the ulnar distribution, is not present in the medial distribution and is not present in the radial distribution. Normal strength noted. He exhibits no finger abduction, no thumb/finger opposition and no wrist extension trouble.        Left hand: He exhibits normal range of motion, no tenderness, normal capillary refill and no swelling. Normal sensation noted. Decreased sensation is not present in the ulnar distribution, is not present in the medial redistribution and is not present in the radial distribution. Normal strength noted. He exhibits no finger abduction, no thumb/finger opposition and no wrist extension trouble.        Hands:       Right lower leg: He exhibits no edema.        Left lower leg: He exhibits no edema.   Neurological: He is alert and oriented to person, place, and time. He has normal strength. No cranial nerve deficit or sensory deficit. Coordination and gait normal.   Skin: Skin is warm and dry. No rash noted.   Psychiatric: He has a normal mood and affect. His behavior is normal. Judgment and thought content normal.   Nursing note and vitals reviewed.      Assessment:       1.  Rotator cuff syndrome, unspecified laterality    2. Trigger finger, unspecified finger, unspecified laterality        Plan:     Medication List with Changes/Refills   Discontinued Medications    ACYCLOVIR (ZOVIRAX) 800 MG TAB    Take 1 tablet (800 mg total) by mouth 4 (four) times daily. for 7 days    DICLOFENAC (VOLTAREN) 75 MG EC TABLET    Take 1 tablet (75 mg total) by mouth 2 (two) times daily.     Buddy was seen today for follow-up and shoulder pain.    Diagnoses and all orders for this visit:    Rotator cuff syndrome, unspecified laterality  -     Ambulatory referral/consult to Physical/Occupational Therapy; Future    Trigger finger, unspecified finger, unspecified laterality  -     betamethasone acetate-betamethasone sodium phosphate injection 6 mg      See meds, orders, follow up, routing and instructions sections of encounter and AVS. Discussed with patient and provided on AVS.    The patient was counseled about risks and benefits of trigger finger injection (palmar flexor tendon sheath) including, but not limited to pain, infection even that requiring surgery to clean out, failure to provide relief, transient flare, tissue damage and recurrence, including tendon rupture. Patient expressed understanding, was given the opportunity to ask questions and any questions answered and consented verbally. Time out performed for localization, medication and patient identification using multiple identifiers.    Following a sterile prep, a 1:1 ml. Lidocaine:corticosteroid documented in MAR was instilled in the  R 4 digit flexor tendon sheath, A1 pulley area. Procedure was tolerated well and appropriate post procedure instructions given included limited use and avoidance of hot or sharp objects/risk items.      Symptoms consistent with rotator cuff syndrome.  Recommend physical therapy as an initial step.  If not effective in 6 or so weeks, consider injection, consider further imaging.    Ortho consult if trigger  finger not relieved with injection today.  Side effects of medications were

## 2020-05-25 ENCOUNTER — CLINICAL SUPPORT (OUTPATIENT)
Dept: REHABILITATION | Facility: HOSPITAL | Age: 35
End: 2020-05-25
Attending: FAMILY MEDICINE
Payer: MEDICARE

## 2020-05-25 DIAGNOSIS — G89.29 CHRONIC PAIN OF BOTH SHOULDERS: ICD-10-CM

## 2020-05-25 DIAGNOSIS — M25.619 IMPAIRED RANGE OF MOTION OF SHOULDER: ICD-10-CM

## 2020-05-25 DIAGNOSIS — M75.100 ROTATOR CUFF SYNDROME, UNSPECIFIED LATERALITY: ICD-10-CM

## 2020-05-25 DIAGNOSIS — R29.3 POSTURE IMBALANCE: ICD-10-CM

## 2020-05-25 DIAGNOSIS — M25.512 CHRONIC PAIN OF BOTH SHOULDERS: ICD-10-CM

## 2020-05-25 DIAGNOSIS — M25.511 CHRONIC PAIN OF BOTH SHOULDERS: ICD-10-CM

## 2020-05-25 PROCEDURE — 97110 THERAPEUTIC EXERCISES: CPT | Mod: PN

## 2020-05-25 PROCEDURE — 97161 PT EVAL LOW COMPLEX 20 MIN: CPT | Mod: PN

## 2020-05-25 NOTE — PROGRESS NOTES
2/16/2017      RE: Alexandro Pool  [address]    [place] [address]       HISTORY OF PRESENT ILLNESS:  I had the pleasure of seeing Alexandro Pool for followup in the Liver Clinic at the [facility] on 02/16/2017.  Mr. 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        He reports he is feeling very well.  He has noticed a boost in energy since we cured his hepatitis C.  He denies any abdominal pain, itching or skin rash or fatigue.  He denies any increased abdominal girth or lower extremity edema.  He denies any fevers or chills, cough or shortness of breath.  He denies any nausea, vomiting, diarrhea or constipation.  He has not had any gastrointestinal bleeding or any overt signs of encephalopathy.  His appetite has been good, and his weight has been relatively stable.       Current Outpatient Prescriptions   Medication     lisinopril (PRINIVIL,ZESTRIL) 2.5 MG tablet     spironolactone (ALDACTONE) 25 MG tablet     order for DME     Vorapaxar Sulfate (ZONTIVITY) 2.08 MG tablet     atorvastatin (LIPITOR) 10 MG tablet     insulin pen needle (BD KWABENA U/F) 32G X 4 MM     insulin lispro (HUMALOG VIAL) SOLN 100 UNIT/ML     hydrocortisone 1 % cream     SENNA PO     morphine (MS CONTIN) 15 MG 12 hr tablet     oxyCODONE (ROXICODONE) 10 MG immediate release tablet     gabapentin (NEURONTIN) 100 MG capsule     insulin glargine (LANTUS) SOLN 100 UNIT/ML     ASPIRIN EC PO     METOPROLOL SUCCINATE ER PO     amLODIPine (NORVASC) 2.5 MG tablet     acetaminophen (TYLENOL) 325 MG tablet     No current facility-administered medications for this visit.      B/P: 114/75, T: 97.8, P: 60, R: Data Unavailable    HEENT exam shows no scleral icterus and no temporal muscle wasting.  Chest is clear.  Abdominal exam shows no increase in girth.  No masses or tenderness to palpation are present.  His liver is  See plan of care for initial evaluation.   10 cm in span without left lobe enlargement.  No spleen tip is palpable.  Extremity exam shows no edema.  He is missing his right leg above the knee.  Skin exam shows no stigmata of chronic liver disease.  Neurologic exam shows no asterixis.       Recent Results (from the past 168 hour(s))    Hepatic Panel [LAB20]    Collection Time: 02/16/17  7:55 AM   Result Value Ref Range    Bilirubin Direct 0.1 0.0 - 0.2 mg/dL    Bilirubin Total 0.4 0.2 - 1.3 mg/dL    Albumin 3.6 3.4 - 5.0 g/dL    Protein Total 8.0 6.8 - 8.8 g/dL    Alkaline Phosphatase 65 40 - 150 U/L    ALT 20 0 - 70 U/L    AST 20 0 - 45 U/L   Basic metabolic panel [LAB15]    Collection Time: 02/16/17  7:55 AM   Result Value Ref Range    Sodium 137 133 - 144 mmol/L    Potassium 4.2 3.4 - 5.3 mmol/L    Chloride 102 94 - 109 mmol/L    Carbon Dioxide 26 20 - 32 mmol/L    Anion Gap 9 3 - 14 mmol/L    Glucose 175 (H) 70 - 99 mg/dL    Urea Nitrogen 19 7 - 30 mg/dL    Creatinine 0.80 0.66 - 1.25 mg/dL    GFR Estimate >90  Non  GFR Calc   >60 mL/min/1.7m2    GFR Estimate If Black >90   GFR Calc   >60 mL/min/1.7m2    Calcium 9.0 8.5 - 10.1 mg/dL   CBC with platelets [GME406]    Collection Time: 02/16/17  7:55 AM   Result Value Ref Range    WBC 7.8 4.0 - 11.0 10e9/L    RBC Count 4.99 4.4 - 5.9 10e12/L    Hemoglobin 14.7 13.3 - 17.7 g/dL    Hematocrit 42.9 40.0 - 53.0 %    MCV 86 78 - 100 fl    MCH 29.5 26.5 - 33.0 pg    MCHC 34.3 31.5 - 36.5 g/dL    RDW 14.1 10.0 - 15.0 %    Platelet Count 176 150 - 450 10e9/L   INR [WPN0719]    Collection Time: 02/16/17  7:55 AM   Result Value Ref Range    INR 1.03 0.86 - 1.14   AFP tumor marker [HSM537]    Collection Time: 02/16/17  7:55 AM   Result Value Ref Range    Alpha Fetoprotein 3.4 0 - 8 ug/L      I did review his ultrasound which shows no mass lesions and no ascites.      My impression is that Mr. 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  His disease is extremely well compensated at  this point in time.  He is up-to-date with regard to variceal screening and cancer screening.  He does need a flu shot and will also get a Prevnar 13 vaccine today.  Otherwise, I will not be making any other change to his medical regimen.  I will see him back in the clinic again in 6 months.      Thank you very much for allowing me to participate in the care of this patient.  If you have any questions regarding my recommendations, please do not hesitate to contact me.       Nehemias Cevallos MD      Professor of Medicine  [employer]      Executive Medical Director, Solid Organ Transplant Program  [facility]

## 2020-05-25 NOTE — PLAN OF CARE
[facility]  Physical Therapy Initial Evaluation    Name: Buddy Parrish  Clinic Number: [id number]    Therapy Diagnosis:   Encounter Diagnoses   Name Primary?    Rotator cuff syndrome, unspecified laterality     Impaired range of motion of shoulder     Chronic pain of both shoulders     Posture imbalance      Physician: Jose Angel Ca MD    Physician Orders: PT Eval and Treat   Medical Diagnosis from Referral: M75.100 (ICD-10-CM) - Rotator cuff syndrome, unspecified laterality  Evaluation Date: 5/25/2020  Plan of Care Expiration: 8/25/20    Authorization Period Expiration: 5/19/21  Visit # / Visits authorized: 1/ 99      Time In: 1530  Time Out: 1630    Total Billable Time: 60 minutes    Precautions: Standard    Subjective   Date of onset: > 2 months    History of current condition:   Buddy  is a 35 year old right handed male presenting with c/o bilateral shoulder pain right> left.  He reports an insidious onset 2 months ago.  He states he had some soreness after doing some landscaping. He states he may have been overdoing things with the weed eater or working out.   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 He denies any recent injections or diagnostics. His goal is to be able to get some mobility with his shoulders so he can do more.      Medical History:   Past Medical History:   Diagnosis Date    ADHD (attention deficit hyperactivity disorder)     Anxiety     Chronic back pain     Depression     Insomnia     Migraine headache     OCD (obsessive compulsive disorder)     Seasonal allergies        Surgical History:   Buddy Parrish  has a past surgical history that includes Knee arthroscopy; Patella realignment; and Circumcision.    Medications:   Buddy currently has no medications in their medication list.    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       Objective       Observation: Pt stands with rounded shoulders, forward head posture  Palpation: anterior deltoid tenderness globally, proximal biceps tendon      Range of Motion/Strength:     Shoulder   Right    Left      AROM  PROM  MMT  AROM  PROM  MMT    flexion    150 NT 4-  160 NT 4-   Abduction    145 NT 4 155 NT 4   Internal rotation  30 NT 4 40 NT 4   ER at 90° abd  70 NT 4- 90 NT 4-   Functional IR L2 NT NT T8 NT NT     AROM: C/S: WFL  : R: 4 L: 4    Special Tests:  -Impingement tests x 5: positive  -painful arc: positive      CMS Impairment/Limitation/Restriction for FOTO Shoulder Survey  Status Limitation G-Code CMS Severity Modifier  Intake 56% 44% Current Status CK - At least 40 percent but less than 60 percent  Predicted 76% 24% Goal Status+ CJ - At least 20 percent but less than 40 percent    TREATMENT     Treatment Time In: 1600  Treatment Time Out: 1630    Total Treatment time separate from Evaluation: 30 minutes    Buddy received therapeutic exercises to develop strength, endurance, ROM, flexibility, posture and core stabilization for 15 minutes including:     -scapular retraction 2 x 10  -supine wand flexion x 15  -GH isometrics 2 x 10  -pec stretch 30 sec x 3 on 1/2 roll    Buddy received the following manual therapy techniques:  were applied to the: right shoulder for 5 minutes, including:  GH oscillation, PROM    Buddy received cold pack for 10 minutes to right shoulder to go.       Education  provided:   - HEP compliance, postural awareness    Written Home Exercises Provided: yes.    Exercises were reviewed and Buddy was able to demonstrate them prior to the end of the session.  Buddy demonstrated good  understanding of the education provided.     See EMR under Patient Instructions for exercises provided 5/25/2020.    Assessment     Pt presents with signs and symptoms consistent with referring diagnosis. Evaluation has determined a decrease in functional status and subjective and objective deficits that can be addressed by physical therapy intervention. Pt demonstrates pain limiting functional activities. Decreased flexibility and strength limiting normal movement patterns. Decreased segmental motion. Decreased postural strength and awareness. Positive special testing. Decreased participation in functional and recreational activities. Subjective and objective measures are addressed by goals in the plan of care.  Patient/family are involved in the development of these goals. Patient/family are educated about current injury and treatment.       Plan of care was dicussed with patient. Pt will benefit from skilled outpatient Physical Therapy to address the deficits stated above and in the chart below, provide pt/family education, and to maximize pt's level of independence. Pt's spiritual, cultural and educational needs considered and patient is agreeable to the plan of care and goals as stated below:     Pt prognosis is Good.  Anticipated Barriers for therapy: none    Medical Necessity is demonstrated by the following  History  Co-morbidities and personal factors that may impact the plan of care Co-morbidities:   ADHD (attention deficit hyperactivity disorder)   Anxiety   Chronic back pain   Depression   Insomnia   Migraine headache   OCD (obsessive compulsive disorder)   Seasonal allergies       Personal Factors:   no deficits     low   Examination  Body Structures and Functions, activity limitations and  participation restrictions that may impact the plan of care Body Regions:   upper extremities    Body Systems:    gross symmetry  ROM  strength    Participation Restrictions:   Exercise,     Activity limitations:   Learning and applying knowledge  no deficits    General Tasks and Commands  no deficits    Communication  no deficits    Mobility  lifting and carrying objects    Self care  dressing    Domestic Life  shopping  cooking  doing house work (cleaning house, washing dishes, laundry)    Interactions/Relationships  no deficits    Life Areas  no deficits    Community and Social Life  community life  recreation and leisure         low   Clinical Presentation stable and uncomplicated low   Decision Making/ Complexity Score: low     Goals:    Short Term Goals (4 Weeks):     1.Pt to increase strength by a 1/2 grade of muscles test to allow for improvement in functional activities such as performing chores.  2.Pt to improve range of motion by 25% to allow for improved functional mobility to allow for improvement in IADLs.   3.Pt to report compliance with HEP and demonstrate proper exercise technique to PT to show competence with self management of condition.  4.Decrease pain by 25% during functional activities.    Long Term Goals (12 Weeks):     1. Increase ROM to allow improved joint biomechanics during functional activities.   2.Increase trunk and lower extremity strength to within normal limits during functional activities.   3. Independent with home exercise program.   4. Full return to functional activities with manageable complaints.  5. Patient to demonstrate improved posture and body mechanics.  6. Decrease pain by 75% during functional activities.    Plan     Plan of care Certification: 5/25/2020 to 8/25/20.    Recommended Treatment Plan: 2 times per week for 12 weeks with treatments to consist of:  Neuromuscular and postural re-education,  training, therapeutic exercise, therapeutic  activities,balance training, gait training, manual therapy, soft tissue mobilization, ROM exercises, Cardiovascular,  Postural stabilization, manual traction, spinal mobilization, moist heat, cryotherapy, electrical stimulation, ultrasound, home exercise education and planning.    Sincere Rutherford, PT    Agree with PT/OT assessment and approve continuation of care. MD [place], MA

## 2020-06-04 ENCOUNTER — CLINICAL SUPPORT (OUTPATIENT)
Dept: REHABILITATION | Facility: HOSPITAL | Age: 35
End: 2020-06-04
Attending: FAMILY MEDICINE
Payer: MEDICARE

## 2020-06-04 DIAGNOSIS — G89.29 CHRONIC PAIN OF BOTH SHOULDERS: ICD-10-CM

## 2020-06-04 DIAGNOSIS — R29.3 POSTURE IMBALANCE: ICD-10-CM

## 2020-06-04 DIAGNOSIS — M25.512 CHRONIC PAIN OF BOTH SHOULDERS: ICD-10-CM

## 2020-06-04 DIAGNOSIS — M25.619 IMPAIRED RANGE OF MOTION OF SHOULDER: ICD-10-CM

## 2020-06-04 DIAGNOSIS — M25.511 CHRONIC PAIN OF BOTH SHOULDERS: ICD-10-CM

## 2020-06-04 PROCEDURE — 97140 MANUAL THERAPY 1/> REGIONS: CPT | Mod: PN

## 2020-06-04 PROCEDURE — 97110 THERAPEUTIC EXERCISES: CPT | Mod: PN

## 2020-06-04 NOTE — PROGRESS NOTES
Physical Therapy Daily Treatment Note     Name: Buddy Parrish  Clinic Number: [id number]    Therapy Diagnosis:   Encounter Diagnoses   Name Primary?    Impaired range of motion of shoulder     Chronic pain of both shoulders     Posture imbalance      Physician: Jose Angel Ca MD    Visit Date: 6/4/2020    Physician Orders: PT Eval and Treat   Medical Diagnosis from Referral: M75.100 (ICD-10-CM) - Rotator cuff syndrome, unspecified laterality  Evaluation Date: 5/25/2020  Plan of Care Expiration: 8/25/20     Authorization Period Expiration: 5/19/21  Visit # / Visits authorized: 2/ 99      Time In: 1600  Time Out: 1655    Total Billable Time: 55 minutes    Precautions: Standard    Subjective     Pt reports:  The shoulder is doing a little better.  He was compliant with home exercise program.  Response to previous treatment: fair  Functional change: none to note    Pain: 5/10  Location: right shoulder > left      Objective     Buddy received therapeutic exercises to develop strength, endurance, ROM, flexibility, posture and core stabilization for 45 minutes including:     -scapular retraction 2 x 10  -supine wand flexion x 15 on 1/2 roll  -GH isometrics 2 x 10  -pec stretch 30 sec x 3 on 1/2 roll  -bilateral shoulder extension 3 x 10 RTB  -rows 3 x 10 RTB  -supine horiz abduction modified RTB 3 x 10  -seated thoracic extension x 20       Buddy received the following manual therapy techniques:  were applied to the: right shoulder for 10 minutes, including:  GH oscillation, PROM     Buddy received cold pack for 10 minutes to right shoulder to go.         Education provided:   - HEP compliance, postural awareness      Written Home Exercises Provided: Patient instructed to cont prior HEP.  Exercises were reviewed and Buddy was able to demonstrate them prior to the end of the session.  Buddy demonstrated good  understanding of the education provided.     See EMR under Patient Instructions for exercises provided  6/4/2020.    Assessment     Requires min cueing with postural correction with prescribed therex. Mild c/o increased discomfort with ROM and sub-maximal strengthening emphasis. Buddy is progressing well towards his goals.   Pt prognosis is Good.     Pt will continue to benefit from skilled outpatient physical therapy to address the deficits listed in the problem list box on initial evaluation, provide pt/family education and to maximize pt's level of independence in the home and community environment.     Pt's spiritual, cultural and educational needs considered and pt agreeable to plan of care and goals.     Anticipated barriers to physical therapy: none    Short Term Goals (4 Weeks):     1.Pt to increase strength by a 1/2 grade of muscles test to allow for improvement in functional activities such as performing chores.  2.Pt to improve range of motion by 25% to allow for improved functional mobility to allow for improvement in IADLs.   3.Pt to report compliance with HEP and demonstrate proper exercise technique to PT to show competence with self management of condition.  4.Decrease pain by 25% during functional activities.    Long Term Goals (12 Weeks):     1. Increase ROM to allow improved joint biomechanics during functional activities.   2.Increase trunk and upper extremity strength to within normal limits during functional activities.   3. Independent with home exercise program.   4. Full return to functional activities with manageable complaints.  5. Patient to demonstrate improved posture and body mechanics.  6. Decrease pain by 75% during functional activities.    Plan     Recommended Treatment Plan: 2-3 times per week for 12 weeks with treatments to consist of:  Neuromuscular and postural re-education,  training, therapeutic exercise, therapeutic activities,balance training, gait training, manual therapy, soft tissue mobilization, ROM exercises, Cardiovascular,  Postural stabilization, manual  traction, spinal mobilization, moist heat, cryotherapy, electrical stimulation, ultrasound, home exercise education and planning.    Continue with established Plan of Care towards PT goals.     Sincere Rutherford, PT

## 2020-07-08 ENCOUNTER — HOSPITAL ENCOUNTER (EMERGENCY)
Facility: HOSPITAL | Age: 35
Discharge: HOME | End: 2020-07-08
Attending: EMERGENCY MEDICINE
Payer: MEDICARE

## 2020-07-08 ENCOUNTER — HOSPITAL ENCOUNTER (OUTPATIENT)
Dept: RADIOLOGY | Facility: HOSPITAL | Age: 35
Discharge: HOME | End: 2020-07-08
Attending: FAMILY MEDICINE
Payer: MEDICARE

## 2020-07-08 ENCOUNTER — OFFICE VISIT (OUTPATIENT)
Dept: INTERNAL MEDICINE | Facility: CLINIC | Age: 35
End: 2020-07-08
Attending: FAMILY MEDICINE
Payer: MEDICARE

## 2020-07-08 VITALS
SYSTOLIC BLOOD PRESSURE: 128 MMHG | BODY MASS INDEX: 28.49 KG/M2 | HEART RATE: 67 BPM | OXYGEN SATURATION: 97 % | HEIGHT: 73 IN | DIASTOLIC BLOOD PRESSURE: 76 MMHG | WEIGHT: 214.94 LBS

## 2020-07-08 VITALS
TEMPERATURE: 99 F | BODY MASS INDEX: 28.63 KG/M2 | OXYGEN SATURATION: 98 % | DIASTOLIC BLOOD PRESSURE: 78 MMHG | HEIGHT: 73 IN | RESPIRATION RATE: 18 BRPM | SYSTOLIC BLOOD PRESSURE: 131 MMHG | WEIGHT: 216 LBS | HEART RATE: 66 BPM

## 2020-07-08 DIAGNOSIS — M75.101 ROTATOR CUFF SYNDROME OF RIGHT SHOULDER: ICD-10-CM

## 2020-07-08 DIAGNOSIS — M25.511 RIGHT SHOULDER PAIN, UNSPECIFIED CHRONICITY: ICD-10-CM

## 2020-07-08 DIAGNOSIS — M79.89 CALF SWELLING: ICD-10-CM

## 2020-07-08 DIAGNOSIS — M79.661 RIGHT CALF PAIN: ICD-10-CM

## 2020-07-08 DIAGNOSIS — M25.461 EFFUSION OF RIGHT KNEE: Primary | ICD-10-CM

## 2020-07-08 DIAGNOSIS — M25.561 ACUTE PAIN OF RIGHT KNEE: Primary | ICD-10-CM

## 2020-07-08 DIAGNOSIS — M25.461 EFFUSION OF RIGHT KNEE: ICD-10-CM

## 2020-07-08 DIAGNOSIS — R60.9 EDEMA, UNSPECIFIED TYPE: ICD-10-CM

## 2020-07-08 PROCEDURE — 73562 X-RAY EXAM OF KNEE 3: CPT | Mod: TC,LT

## 2020-07-08 PROCEDURE — 99214 OFFICE O/P EST MOD 30 MIN: CPT | Mod: S$PBB,,, | Performed by: FAMILY MEDICINE

## 2020-07-08 PROCEDURE — 63600175 PHARM REV CODE 636 W HCPCS: Performed by: PHYSICIAN ASSISTANT

## 2020-07-08 PROCEDURE — 73564 X-RAY EXAM KNEE 4 OR MORE: CPT | Mod: 26,RT,, | Performed by: RADIOLOGY

## 2020-07-08 PROCEDURE — 73562 X-RAY EXAM OF KNEE 3: CPT | Mod: 26,59,LT, | Performed by: RADIOLOGY

## 2020-07-08 PROCEDURE — 73030 XR SHOULDER COMPLETE 2 OR MORE VIEWS RIGHT: ICD-10-PCS | Mod: 26,RT,, | Performed by: RADIOLOGY

## 2020-07-08 PROCEDURE — 73030 X-RAY EXAM OF SHOULDER: CPT | Mod: 26,RT,, | Performed by: RADIOLOGY

## 2020-07-08 PROCEDURE — 73562 XR KNEE ORTHO RIGHT WITH FLEXION: ICD-10-PCS | Mod: 26,59,LT, | Performed by: RADIOLOGY

## 2020-07-08 PROCEDURE — 99214 OFFICE O/P EST MOD 30 MIN: CPT | Mod: PBBFAC,25 | Performed by: FAMILY MEDICINE

## 2020-07-08 PROCEDURE — 96372 THER/PROPH/DIAG INJ SC/IM: CPT

## 2020-07-08 PROCEDURE — 99284 EMERGENCY DEPT VISIT MOD MDM: CPT | Mod: 25,27

## 2020-07-08 PROCEDURE — 73564 XR KNEE ORTHO RIGHT WITH FLEXION: ICD-10-PCS | Mod: 26,RT,, | Performed by: RADIOLOGY

## 2020-07-08 PROCEDURE — 99999 PR PBB SHADOW E&M-EST. PATIENT-LVL IV: ICD-10-PCS | Mod: PBBFAC,,, | Performed by: FAMILY MEDICINE

## 2020-07-08 PROCEDURE — 99214 PR OFFICE/OUTPT VISIT, EST, LEVL IV, 30-39 MIN: ICD-10-PCS | Mod: S$PBB,,, | Performed by: FAMILY MEDICINE

## 2020-07-08 PROCEDURE — 99999 PR PBB SHADOW E&M-EST. PATIENT-LVL IV: CPT | Mod: PBBFAC,,, | Performed by: FAMILY MEDICINE

## 2020-07-08 PROCEDURE — 73030 X-RAY EXAM OF SHOULDER: CPT | Mod: TC,RT

## 2020-07-08 RX ORDER — KETOROLAC TROMETHAMINE 30 MG/ML
30 INJECTION, SOLUTION INTRAMUSCULAR; INTRAVENOUS
Status: COMPLETED | OUTPATIENT
Start: 2020-07-08 | End: 2020-07-08

## 2020-07-08 RX ORDER — MELOXICAM 7.5 MG/1
7.5 TABLET ORAL DAILY
Qty: 20 TABLET | Refills: 0 | Status: SHIPPED | OUTPATIENT
Start: 2020-07-08 | End: 2020-07-13

## 2020-07-08 RX ORDER — DEXAMETHASONE SODIUM PHOSPHATE 4 MG/ML
12 INJECTION, SOLUTION INTRA-ARTICULAR; INTRALESIONAL; INTRAMUSCULAR; INTRAVENOUS; SOFT TISSUE
Status: COMPLETED | OUTPATIENT
Start: 2020-07-08 | End: 2020-07-08

## 2020-07-08 RX ADMIN — DEXAMETHASONE SODIUM PHOSPHATE 12 MG: 4 INJECTION, SOLUTION INTRAMUSCULAR; INTRAVENOUS at 07:07

## 2020-07-08 RX ADMIN — KETOROLAC TROMETHAMINE 30 MG: 30 INJECTION, SOLUTION INTRAMUSCULAR at 07:07

## 2020-07-08 NOTE — PATIENT INSTRUCTIONS
Schedule lab orders for today.     See Cardiology department test orders and please schedule today (STAT).    [facility]ricing Office:  [phone]    OTC Ibuprofen 200 mg tablets. Take 3 tablets every 6 hours for 3-5 days as needed for pain, fever.

## 2020-07-08 NOTE — Clinical Note
Schedule lab orders for today.     See Cardiology department test orders and please schedule today (STAT).

## 2020-07-08 NOTE — PROVIDER PROGRESS NOTES - EMERGENCY DEPT.
Emergency Department TeleTRIAGE Encounter Note      CHIEF COMPLAINT    Chief Complaint   Patient presents with    Leg Swelling     Right leg swelling. started 3 wks ago. swelling at the calf, redness, hot too touch. PMS intact       VITAL SIGNS   Initial Vitals [07/08/20 1741]   BP Pulse Resp Temp SpO2   119/79 64 18 98.4 °F (36.9 °C) 97 %      MAP       --            ALLERGIES    Review of patient's allergies indicates:   Allergen Reactions    Flexeril [cyclobenzaprine] Hives       PROVIDER TRIAGE NOTE  This is a teletriage evaluation of a 35 y.o. male presenting to the ED with c/o calf pain swelling. Sent to ED for further evaluation with US. Initial orders will be placed and care will be transferred to an alternate provider when patient is roomed for a full evaluation. Any additional orders and the final disposition will be determined by that provider.         ORDERS  Labs Reviewed - No data to display    ED Orders (720h ago, onward)    Start Ordered     Status Ordering Provider    07/08/20 1749 07/08/20 1749  US Lower Extremity Veins Right  1 time imaging      Ordered RAMONA RAMIREZ            Virtual Visit Note: The provider triage portion of this emergency department evaluation and documentation was performed via [employer], a HIPAA-compliant telemedicine application, in concert with a tele-presenter in the room. A face to face patient evaluation with one of my colleagues will occur once the patient is placed in an emergency department room.      DISCLAIMER: This note was prepared with [employer] voice recognition transcription software. Garbled syntax, mangled pronouns, and other bizarre constructions may be attributed to that software system.

## 2020-07-08 NOTE — PROGRESS NOTES
Subjective:       Patient ID: Buddy Parrish is a 35 y.o. male.    Chief Complaint: Follow-up (2 month f/u ), Shoulder Pain (right shoulder pain, pain rate 7), and Knee Pain (right knee pain, pain rate 7 )    Right shoulder pain 3 months.  No improvement with 4 episodes of physical therapy.  Had difficulty making appointments due to COVID crisis.  No injury reported.  Worse with overhead or behind the back motion.  No fever chills constitutional complaints, neck pain or pain radiation in the right upper extremity.  Difficulty with Voltaren.  Not taking.    Right knee pain for 2 weeks.  Some swelling noted.  Possible pain in calf.  No injury.  No physical activity or provocative event reported.    Review of Systems   Constitutional: Positive for activity change. Negative for chills, fatigue, fever and unexpected weight change.   HENT: Negative for congestion, hearing loss, rhinorrhea and trouble swallowing.    Eyes: Negative for discharge, redness and visual disturbance.   Respiratory: Negative for cough, chest tightness, shortness of breath and wheezing.    Cardiovascular: Negative for chest pain, palpitations and leg swelling.   Gastrointestinal: Negative for abdominal pain, blood in stool, constipation, diarrhea and vomiting.   Endocrine: Negative for polydipsia and polyuria.   Genitourinary: Negative for difficulty urinating, hematuria and urgency.   Musculoskeletal: Positive for arthralgias, gait problem and joint swelling. Negative for back pain, myalgias and neck pain.   Skin: Negative for color change and rash.   Neurological: Negative for tremors, speech difficulty, weakness, numbness and headaches.   Hematological: Negative for adenopathy. Does not bruise/bleed easily.   Psychiatric/Behavioral: Negative for behavioral problems, confusion, dysphoric mood and sleep disturbance. The patient is not nervous/anxious.        Objective:      Physical Exam  Vitals signs and nursing note reviewed.   Constitutional:        General: He is not in acute distress.     Appearance: He is well-developed.   Neck:      Musculoskeletal: Full passive range of motion without pain, normal range of motion and neck supple.      Thyroid: No thyroid mass.   Pulmonary:      Effort: Pulmonary effort is normal.   Musculoskeletal:      Right shoulder: He exhibits tenderness and decreased strength. He exhibits normal range of motion, no bony tenderness, no swelling, no effusion, no crepitus, no deformity, no laceration, no pain, no spasm and normal pulse.      Left shoulder: He exhibits normal range of motion, no tenderness, no bony tenderness, no swelling, no effusion, no crepitus, no deformity, no laceration, no pain, no spasm, normal pulse and normal strength.      Right knee: He exhibits decreased range of motion and swelling. He exhibits no deformity. Tenderness found. Medial joint line and patellar tendon tenderness noted.      Left knee: He exhibits normal range of motion. No tenderness found.      Cervical back: He exhibits normal range of motion, no tenderness and no spasm.      Right hand: He exhibits normal range of motion, no tenderness, normal capillary refill and no swelling. Normal sensation noted. Decreased sensation is not present in the ulnar distribution, is not present in the medial distribution and is not present in the radial distribution. Normal strength noted. He exhibits no finger abduction, no thumb/finger opposition and no wrist extension trouble.      Left hand: He exhibits normal range of motion, no tenderness, normal capillary refill and no swelling. Normal sensation noted. Decreased sensation is not present in the ulnar distribution, is not present in the medial redistribution and is not present in the radial distribution. Normal strength noted. He exhibits no finger abduction, no thumb/finger opposition and no wrist extension trouble.      Right lower leg: He exhibits tenderness. Edema present.      Left lower leg: No  edema.   Skin:     General: Skin is warm and dry.      Findings: No rash.   Neurological:      Mental Status: He is alert and oriented to person, place, and time.      Cranial Nerves: No cranial nerve deficit.      Sensory: No sensory deficit.      Coordination: Coordination normal.      Gait: Gait normal.   Psychiatric:         Behavior: Behavior normal.         Thought Content: Thought content normal.         Judgment: Judgment normal.         Assessment:       1. Effusion of right knee    2. Right shoulder pain, unspecified chronicity    3. Calf swelling    4. Rotator cuff syndrome of right shoulder    5. Edema, unspecified type        Plan:     Buddy was seen today for follow-up, shoulder pain and knee pain.    Diagnoses and all orders for this visit:    Effusion of right knee  -     C-Reactive Protein; Future  -     Sedimentation rate; Future  -     Cyclic Citrullinated Peptide Antibody, IgG; Future  -     Rheumatoid factor; Future  -     LOUIS Screen w/Reflex; Future  -     CK; Future  -     Uric acid; Future  -     CV Ultrasound doppler venous DVT leg right; Future  -     CBC auto differential; Future  -     Basic metabolic panel; Future  -     X-ray Knee Ortho Right; Future    Right shoulder pain, unspecified chronicity  -     MRI Shoulder Without Contrast Right; Future  -     X-ray Shoulder 2 or More Views Right; Future    Calf swelling    Rotator cuff syndrome of right shoulder  -     MRI Shoulder Without Contrast Right; Future  -     X-ray Shoulder 2 or More Views Right; Future    Edema, unspecified type  -     CV Ultrasound doppler venous DVT leg right; Future      See meds, orders, follow up, routing and instructions sections of encounter and AVS. Discussed with patient and provided on AVS.    Seems to have him the hypertrophy of the right side including calf, thigh.  Possible mild effusion right knee.  Slightly reduced range of motion.  Not warm or red.  No fever reported.  Past vital sign check  entering building today.    Right shoulder pain persists.  Did perform some physical therapy but on a able to maintain secondary to COVID restrictions.  I think an MRI is in order since his pain has not improved over the course of 3 months.  Symptoms suggest rotator cuff syndrome, impingement.  There was some weakness to external rotation on provocative examination.

## 2020-07-09 NOTE — ED PROVIDER NOTES
Encounter Date: 7/8/2020       History     Chief Complaint   Patient presents with    Leg Swelling     Right leg swelling. started 3 wks ago. swelling at the calf, redness, hot too touch. PMS intact. Labs done this morning from [facility]     Chief Complaint:  Knee pain  History of  Present Illness: History obtained from patient. This 35 y.o. male who has no significant past history presents to the ED complaining of right knee pain for 3 weeks with worsening last week.  Patient states that he has been more active over the last week playing football with his son outside.  Denies direct trauma to the knee.  He reports onset of swelling and calf pain few days ago.  He states he addressed the knee pain to his PCP today and obtain x-rays.  During a different evaluation by his allergist, is allergist suspected that he may have a blood clot.  Therefore, he came to the ED for further evaluation.  Denies fever, chills, numbness, tingling, weakness, history of DVT or PE, history of recent long travel.  Patient states he is able to bear weight on right lower extremity but walks with a limp.          Review of patient's allergies indicates:   Allergen Reactions    Flexeril [cyclobenzaprine] Hives     Past Medical History:   Diagnosis Date    ADHD (attention deficit hyperactivity disorder)     Anxiety     Chronic back pain     Depression     Insomnia     Migraine headache     OCD (obsessive compulsive disorder)     Seasonal allergies      Past Surgical History:   Procedure Laterality Date    CIRCUMCISION      KNEE ARTHROSCOPY      PATELLA REALIGNMENT       Family History   Problem Relation Age of Onset    Hypertension Father     Hypertension Paternal Grandfather      Social History     Tobacco Use    Smoking status: Former Smoker     Types: Cigarettes    Smokeless tobacco: Never Used   Substance Use Topics    Alcohol use: Yes     Frequency: 2-4 times a month     Drinks per session: 3 or 4     Binge frequency:  Never     Comment: social    Drug use: No     Review of Systems   Constitutional: Negative for chills and fever.   HENT: Negative for congestion, rhinorrhea and sore throat.    Eyes: Negative for visual disturbance.   Respiratory: Negative for cough and shortness of breath.    Cardiovascular: Negative for chest pain.   Gastrointestinal: Negative for abdominal pain, diarrhea, nausea and vomiting.   Genitourinary: Negative for dysuria, frequency and hematuria.   Musculoskeletal: Positive for arthralgias (Right knee). Negative for back pain.   Skin: Negative for rash.   Neurological: Negative for dizziness, weakness and headaches.       Physical Exam     Initial Vitals [07/08/20 1741]   BP Pulse Resp Temp SpO2   119/79 64 18 98.4 °F (36.9 °C) 97 %      MAP       --         Physical Exam    Nursing note and vitals reviewed.  Constitutional: He appears well-developed and well-nourished. No distress.   HENT:   Head: Normocephalic and atraumatic.   Right Ear: Tympanic membrane normal.   Left Ear: Tympanic membrane normal.   Nose: Nose normal.   Mouth/Throat: Uvula is midline, oropharynx is clear and moist and mucous membranes are normal.   Eyes: EOM are normal. Pupils are equal, round, and reactive to light.   Neck: Trachea normal, normal range of motion, full passive range of motion without pain and phonation normal. Neck supple. No stridor present. No spinous process tenderness and no muscular tenderness present. Normal range of motion present. No neck rigidity.   Cardiovascular: Normal rate, regular rhythm and normal heart sounds. Exam reveals no gallop and no friction rub.    No murmur heard.  Pulses:       Dorsalis pedis pulses are 2+ on the right side and 2+ on the left side.        Posterior tibial pulses are 2+ on the right side and 2+ on the left side.   Pulmonary/Chest: Effort normal and breath sounds normal. No respiratory distress. He has no wheezes. He has no rhonchi. He has no rales.   Abdominal: Soft. Bowel  sounds are normal. He exhibits no mass. There is no abdominal tenderness. There is no rebound and no guarding.   Musculoskeletal: Normal range of motion.      Right knee: He exhibits abnormal patellar mobility. He exhibits no effusion, no ecchymosis, no deformity, no erythema, no LCL laxity and no MCL laxity.      Comments: There there is limited active range of motion the right knee secondary to pain.  However to passively range motion the knee without significant discomfort or difficulty.  The right knee is mildly edematous compared to left there is left calf tenderness.  There is tenderness in the popliteal space in lateral aspect of the right knee.  No redness or increased warmth.  Positive Dina's test.   Neurological: He is alert and oriented to person, place, and time. He has normal strength. No cranial nerve deficit or sensory deficit.   Skin: Skin is warm and dry. Capillary refill takes less than 2 seconds.   Psychiatric: He has a normal mood and affect.         ED Course   Procedures  Labs Reviewed - No data to display       Imaging Results          US Lower Extremity Veins Right (Final result)  Result time 07/08/20 18:59:17    Final result by Sienna Bragg MD (07/08/20 18:59:17)                 Impression:      No evidence of right deep venous thrombosis.      Electronically signed by: Sienna Bragg  Date:    07/08/2020  Time:    18:59             Narrative:    EXAMINATION:  ULTRASOUND LOWER EXTREMITY VEINS RIGHT    CLINICAL HISTORY:  Pain in right lower leg    TECHNIQUE:  Grayscale imaging of the right lower extremity to evaluate the deep and superficial venous system with color Doppler interrogation and Spectral Doppler wave form analysis was performed.    COMPARISON:  None.    FINDINGS:  There is normal color Doppler interrogation, compression and distal augmentation of the right common femoral, femoral, greater saphenous, popliteal, posterior tibial, anterior tibial, and peroneal veins.                                  Medical Decision Making:   ED Management:  This is an evaluation of a 35 y.o. male who presents to the ED for knee pain and calf pain.  Vital signs are stable.   Afebrile.  Patient is nontoxic appearing and in no acute distress. There there is limited active range of motion the right knee secondary to pain.  However to passively range motion the knee without significant discomfort or difficulty.  The right knee is mildly edematous compared to left there is left calf tenderness.  There is tenderness in the popliteal space in lateral aspect of the right knee.  No redness or increased warmth.  Positive Dina's test.    X-ray obtained from PCP today was reviewed.  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  Ultrasound obtained today shows no evidence of DVT.    Given physical exam with negative imaging, I suspect etiology patient's pain likely secondary to meniscus injury.  Patient placed in Ace wrap and provided with crutches.  Encourage patient follow-up with Orthopedics.  I considered but doubt septic joint, fracture, dislocation, DVT, Baker cyst.    Patient given return precautions and instructed to return to the emergency department for any new or worsening symptoms. Patient verbalized understanding and agreed with plan.                                  Clinical Impression:       ICD-10-CM ICD-9-CM   1. Acute pain of right knee  M25.561 719.46   2. Right calf pain  M79.661 729.5             ED Disposition Condition    Discharge Stable        ED Prescriptions     Medication Sig Dispense Start Date End Date Auth. Provider    meloxicam (MOBIC) 7.5 MG tablet Take 1 tablet (7.5 mg total) by mouth once daily. 20 tablet 7/8/2020  Diallo Campa PA-C        Follow-up Information     Follow up With Specialties Details Why Contact Info    [facility] Surgical Oncology, Orthopedic Surgery, Genetics,  Physical Medicine and Rehabilitation, Occupational Therapy, Radiology Schedule an appointment as soon as possible for a visit in 1 day For reevaluation with Orthopedics [address] [place] [id number]  [phone]      [facility] Emergency Medicine Go in 1 day If symptoms worsen [address]  [facility] [id number] [phone]                                     Diallo Campa PA-C  07/08/20 1936

## 2020-07-11 ENCOUNTER — HOSPITAL ENCOUNTER (EMERGENCY)
Facility: HOSPITAL | Age: 35
Discharge: HOME | End: 2020-07-12
Attending: EMERGENCY MEDICINE
Payer: MEDICARE

## 2020-07-11 DIAGNOSIS — M79.604 RIGHT LEG PAIN: ICD-10-CM

## 2020-07-11 DIAGNOSIS — S86.111A RUPTURE OF RIGHT GASTROCNEMIUS TENDON, INITIAL ENCOUNTER: Primary | ICD-10-CM

## 2020-07-11 LAB
ALBUMIN SERPL BCP-MCNC: 4 G/DL (ref 3.5–5.2)
ALP SERPL-CCNC: 78 U/L (ref 55–135)
ALT SERPL W/O P-5'-P-CCNC: 18 U/L (ref 10–44)
ANION GAP SERPL CALC-SCNC: 10 MMOL/L (ref 8–16)
AST SERPL-CCNC: 22 U/L (ref 10–40)
BASOPHILS # BLD AUTO: 0.02 K/UL (ref 0–0.2)
BASOPHILS NFR BLD: 0.3 % (ref 0–1.9)
BILIRUB SERPL-MCNC: 0.7 MG/DL (ref 0.1–1)
BUN SERPL-MCNC: 26 MG/DL (ref 6–20)
CALCIUM SERPL-MCNC: 8.9 MG/DL (ref 8.7–10.5)
CHLORIDE SERPL-SCNC: 102 MMOL/L (ref 95–110)
CK SERPL-CCNC: 301 U/L (ref 20–200)
CO2 SERPL-SCNC: 26 MMOL/L (ref 23–29)
CREAT SERPL-MCNC: 1.4 MG/DL (ref 0.5–1.4)
CRP SERPL-MCNC: 7.7 MG/L (ref 0–8.2)
DIFFERENTIAL METHOD: ABNORMAL
EOSINOPHIL # BLD AUTO: 0.1 K/UL (ref 0–0.5)
EOSINOPHIL NFR BLD: 1.4 % (ref 0–8)
ERYTHROCYTE [DISTWIDTH] IN BLOOD BY AUTOMATED COUNT: 14.1 % (ref 11.5–14.5)
ERYTHROCYTE [SEDIMENTATION RATE] IN BLOOD BY WESTERGREN METHOD: 13 MM/HR (ref 0–23)
EST. GFR  (AFRICAN AMERICAN): >60 ML/MIN/1.73 M^2
EST. GFR  (NON AFRICAN AMERICAN): >60 ML/MIN/1.73 M^2
GLUCOSE SERPL-MCNC: 88 MG/DL (ref 70–110)
HCT VFR BLD AUTO: 42.1 % (ref 40–54)
HGB BLD-MCNC: 13.7 G/DL (ref 14–18)
IMM GRANULOCYTES # BLD AUTO: 0.01 K/UL (ref 0–0.04)
IMM GRANULOCYTES NFR BLD AUTO: 0.1 % (ref 0–0.5)
INR PPP: 1.1 (ref 0.8–1.2)
LYMPHOCYTES # BLD AUTO: 3.1 K/UL (ref 1–4.8)
LYMPHOCYTES NFR BLD: 43.8 % (ref 18–48)
MCH RBC QN AUTO: 27 PG (ref 27–31)
MCHC RBC AUTO-ENTMCNC: 32.5 G/DL (ref 32–36)
MCV RBC AUTO: 83 FL (ref 82–98)
MONOCYTES # BLD AUTO: 0.7 K/UL (ref 0.3–1)
MONOCYTES NFR BLD: 10.3 % (ref 4–15)
NEUTROPHILS # BLD AUTO: 3.1 K/UL (ref 1.8–7.7)
NEUTROPHILS NFR BLD: 44.1 % (ref 38–73)
NRBC BLD-RTO: 0 /100 WBC
PLATELET # BLD AUTO: 238 K/UL (ref 150–350)
PMV BLD AUTO: 11 FL (ref 9.2–12.9)
POTASSIUM SERPL-SCNC: 3.6 MMOL/L (ref 3.5–5.1)
PROT SERPL-MCNC: 7.7 G/DL (ref 6–8.4)
PROTHROMBIN TIME: 10.8 SEC (ref 9–12.5)
RBC # BLD AUTO: 5.08 M/UL (ref 4.6–6.2)
SODIUM SERPL-SCNC: 138 MMOL/L (ref 136–145)
WBC # BLD AUTO: 6.97 K/UL (ref 3.9–12.7)

## 2020-07-11 PROCEDURE — 86140 C-REACTIVE PROTEIN: CPT

## 2020-07-11 PROCEDURE — 84145 PROCALCITONIN (PCT): CPT

## 2020-07-11 PROCEDURE — 96375 TX/PRO/DX INJ NEW DRUG ADDON: CPT

## 2020-07-11 PROCEDURE — 82550 ASSAY OF CK (CPK): CPT

## 2020-07-11 PROCEDURE — 99284 PR EMERGENCY DEPT VISIT,LEVEL IV: ICD-10-PCS | Mod: ,,, | Performed by: EMERGENCY MEDICINE

## 2020-07-11 PROCEDURE — 63600175 PHARM REV CODE 636 W HCPCS: Performed by: EMERGENCY MEDICINE

## 2020-07-11 PROCEDURE — 99285 EMERGENCY DEPT VISIT HI MDM: CPT | Mod: 25

## 2020-07-11 PROCEDURE — 25000003 PHARM REV CODE 250: Performed by: EMERGENCY MEDICINE

## 2020-07-11 PROCEDURE — 85025 COMPLETE CBC W/AUTO DIFF WBC: CPT

## 2020-07-11 PROCEDURE — 85652 RBC SED RATE AUTOMATED: CPT

## 2020-07-11 PROCEDURE — 80053 COMPREHEN METABOLIC PANEL: CPT

## 2020-07-11 PROCEDURE — 85610 PROTHROMBIN TIME: CPT

## 2020-07-11 PROCEDURE — 99284 EMERGENCY DEPT VISIT MOD MDM: CPT | Mod: ,,, | Performed by: EMERGENCY MEDICINE

## 2020-07-11 PROCEDURE — 96374 THER/PROPH/DIAG INJ IV PUSH: CPT

## 2020-07-11 RX ORDER — ONDANSETRON 2 MG/ML
4 INJECTION INTRAMUSCULAR; INTRAVENOUS
Status: COMPLETED | OUTPATIENT
Start: 2020-07-11 | End: 2020-07-11

## 2020-07-11 RX ORDER — MORPHINE SULFATE 2 MG/ML
6 INJECTION, SOLUTION INTRAMUSCULAR; INTRAVENOUS
Status: COMPLETED | OUTPATIENT
Start: 2020-07-11 | End: 2020-07-11

## 2020-07-11 RX ADMIN — SODIUM CHLORIDE 500 ML: 0.9 INJECTION, SOLUTION INTRAVENOUS at 11:07

## 2020-07-11 RX ADMIN — MORPHINE SULFATE 6 MG: 2 INJECTION, SOLUTION INTRAMUSCULAR; INTRAVENOUS at 11:07

## 2020-07-11 RX ADMIN — ONDANSETRON 4 MG: 2 INJECTION INTRAMUSCULAR; INTRAVENOUS at 11:07

## 2020-07-12 VITALS
RESPIRATION RATE: 16 BRPM | OXYGEN SATURATION: 100 % | DIASTOLIC BLOOD PRESSURE: 83 MMHG | WEIGHT: 216 LBS | BODY MASS INDEX: 28.63 KG/M2 | HEIGHT: 73 IN | TEMPERATURE: 98 F | HEART RATE: 74 BPM | SYSTOLIC BLOOD PRESSURE: 123 MMHG

## 2020-07-12 LAB — PROCALCITONIN SERPL IA-MCNC: 0.04 NG/ML

## 2020-07-12 PROCEDURE — A9585 GADOBUTROL INJECTION: HCPCS | Performed by: EMERGENCY MEDICINE

## 2020-07-12 PROCEDURE — 25500020 PHARM REV CODE 255: Performed by: EMERGENCY MEDICINE

## 2020-07-12 PROCEDURE — 25000003 PHARM REV CODE 250: Performed by: EMERGENCY MEDICINE

## 2020-07-12 RX ORDER — OXYCODONE HYDROCHLORIDE 5 MG/1
10 TABLET ORAL
Status: COMPLETED | OUTPATIENT
Start: 2020-07-12 | End: 2020-07-12

## 2020-07-12 RX ORDER — HYDROCODONE BITARTRATE AND ACETAMINOPHEN 5; 325 MG/1; MG/1
1 TABLET ORAL EVERY 6 HOURS PRN
Qty: 12 TABLET | Refills: 0 | Status: SHIPPED | OUTPATIENT
Start: 2020-07-12 | End: 2020-07-15

## 2020-07-12 RX ORDER — GADOBUTROL 604.72 MG/ML
10 INJECTION INTRAVENOUS
Status: COMPLETED | OUTPATIENT
Start: 2020-07-12 | End: 2020-07-12

## 2020-07-12 RX ORDER — IBUPROFEN 600 MG/1
600 TABLET ORAL EVERY 6 HOURS PRN
Qty: 20 TABLET | Refills: 0 | Status: SHIPPED | OUTPATIENT
Start: 2020-07-12 | End: 2020-07-17

## 2020-07-12 RX ADMIN — GADOBUTROL 10 ML: 604.72 INJECTION INTRAVENOUS at 02:07

## 2020-07-12 RX ADMIN — OXYCODONE HYDROCHLORIDE 10 MG: 5 TABLET ORAL at 03:07

## 2020-07-12 NOTE — DISCHARGE INSTRUCTIONS
Diagnosis: Torn muscle (Partial-thickness tear involving the medial head of the gastrocnemius with subcutaneous edema and hematoma along the superficial fascia)    Labs Reviewed   CBC W/ AUTO DIFFERENTIAL - Abnormal; Notable for the following components:       Result Value    Hemoglobin 13.7 (*)     All other components within normal limits   COMPREHENSIVE METABOLIC PANEL - Abnormal; Notable for the following components:    BUN, Bld 26 (*)     All other components within normal limits   CK - Abnormal; Notable for the following components:     (*)     All other components within normal limits   SEDIMENTATION RATE   C-REACTIVE PROTEIN   PROTIME-INR   PROCALCITONIN     Imaging Results              MRI Knee W WO Contrast Right (Final result)  Result time 07/12/20 03:47:05   Procedure changed from MRI Knee With Contrast Right     Final result by Jet Carrasco MD (07/12/20 03:47:05)                   Impression:      Partial-thickness tear involving the medial head of the gastrocnemius with subcutaneous edema and hematoma along the superficial fascia.    Small suprapatellar joint effusion.    No ligament or meniscal injury in the knee.      Electronically signed by: Jet Carrasco MD  Date:    07/12/2020  Time:    03:47               Narrative:    EXAMINATION:  MRI KNEE W WO CONTRAST RIGHT; MRI TIBIA FIBULA W WO CONTRAST RIGHT    CLINICAL HISTORY:  Knee swelling/redness, cellulitis suspected;; Lower leg swelling/redness, cellulitis suspected;Lower leg pain, osteomyelitis suspected, initial exam;    TECHNIQUE:  Multisequence, multiplanar imaging of the right knee and right tibia/fibula was obtained prior to and after the administration of 10 mL Gadavist intravenous contrast.    COMPARISON:  Knee radiographs, 07/08/2020.  Venous ultrasound, 07/11/2020.    FINDINGS:  RIGHT KNEE MRI:    Menisci: There is no tear of the medial or lateral meniscus.    Ligaments/muscles: ACL, PCL, MCL, and LCL complex are intact.   There is edema signal involving the medial head of the gastrocnemius.    Cartilage:    Patellofemoral: Articular cartilage is maintained.    Medial tibiofemoral: Articular cartilage is maintained.    Lateral tibiofemoral: Articular cartilage is maintained.    Bone: No fracture or marrow replacing process.    Miscellaneous: There is a small suprapatellar joint effusion and nonspecific synovial enhancement.  Enhancement also noted adjacent to the medial head of the gastrocnemius, likely inflammatory.  No additional abnormal enhancement.    RIGHT TIBIA/FIBULA MRI:    Focal tear and edema signal involving the medial head of the gastrocnemius.  Moderate soft tissue edema and fluid collection along the medial superficial aspect of the medial gastrocnemius, likely hematoma.  Hematoma measures up to 6 cm AP by 2 cm transverse dimension and correlates with finding on recent ultrasound.  Mild enhancement in this region.    Calf soft tissues are otherwise within normal limits.  Bone marrow signal is within normal limits.                                       MRI Tibia Fibula W WO Contrast Right (Final result)  Result time 07/12/20 03:47:05   Procedure changed from MRI Tibia Fibula With Contrast Right     Final result by Jet Carrasco MD (07/12/20 03:47:05)                   Impression:      Partial-thickness tear involving the medial head of the gastrocnemius with subcutaneous edema and hematoma along the superficial fascia.    Small suprapatellar joint effusion.    No ligament or meniscal injury in the knee.      Electronically signed by: Jet Carrasco MD  Date:    07/12/2020  Time:    03:47               Narrative:    EXAMINATION:  MRI KNEE W WO CONTRAST RIGHT; MRI TIBIA FIBULA W WO CONTRAST RIGHT    CLINICAL HISTORY:  Knee swelling/redness, cellulitis suspected;; Lower leg swelling/redness, cellulitis suspected;Lower leg pain, osteomyelitis suspected, initial exam;    TECHNIQUE:  Multisequence, multiplanar imaging of  the right knee and right tibia/fibula was obtained prior to and after the administration of 10 mL Gadavist intravenous contrast.    COMPARISON:  Knee radiographs, 07/08/2020.  Venous ultrasound, 07/11/2020.    FINDINGS:  RIGHT KNEE MRI:    Menisci: There is no tear of the medial or lateral meniscus.    Ligaments/muscles: ACL, PCL, MCL, and LCL complex are intact.  There is edema signal involving the medial head of the gastrocnemius.    Cartilage:    Patellofemoral: Articular cartilage is maintained.    Medial tibiofemoral: Articular cartilage is maintained.    Lateral tibiofemoral: Articular cartilage is maintained.    Bone: No fracture or marrow replacing process.    Miscellaneous: There is a small suprapatellar joint effusion and nonspecific synovial enhancement.  Enhancement also noted adjacent to the medial head of the gastrocnemius, likely inflammatory.  No additional abnormal enhancement.    RIGHT TIBIA/FIBULA MRI:    Focal tear and edema signal involving the medial head of the gastrocnemius.  Moderate soft tissue edema and fluid collection along the medial superficial aspect of the medial gastrocnemius, likely hematoma.  Hematoma measures up to 6 cm AP by 2 cm transverse dimension and correlates with finding on recent ultrasound.  Mild enhancement in this region.    Calf soft tissues are otherwise within normal limits.  Bone marrow signal is within normal limits.                                       US Lower Extremity Veins Bilateral (Final result)  Result time 07/12/20 00:59:48      Final result by Jet Carrasco MD (07/12/20 00:59:48)                   Impression:      No evidence of deep venous thrombosis in either lower extremity.    Interval development of a 6.0 x 1.2 x 4.3 cm heterogenous fluid collection extending from the right popliteal fossa to the mid posterior calf with no significant increased surrounding vascularity.  Findings are favored to represent a dissecting Baker's cyst or hematoma.   Abscess is felt unlikely in the absence of convincing clinical findings of infection.    Electronically signed by resident: Sin Jones  Date:    07/12/2020  Time:    00:45    Electronically signed by: Jet Carrasco MD  Date:    07/12/2020  Time:    00:59               Narrative:    EXAMINATION:  US LOWER EXTREMITY VEINS BILATERAL    CLINICAL HISTORY:  Pain in right leg    TECHNIQUE:  Duplex and color flow Doppler and dynamic compression was performed of the bilateral lower extremity veins was performed.    COMPARISON:  Ultrasound 07/08/2020.    FINDINGS:  Right thigh veins: The common femoral, femoral, popliteal, upper greater saphenous, and deep femoral veins are patent and free of thrombus. The veins are normally compressible and have normal phasic flow and augmentation response.    Right calf veins: The visualized calf veins are patent.    Left thigh veins: The common femoral, femoral, popliteal, upper greater saphenous, and deep femoral veins are patent and free of thrombus. The veins are normally compressible and have normal phasic flow and augmentation response.    Left calf veins: The visualized calf veins are patent.    Miscellaneous: Interval development of 6.0 x 1.2 x 4.3 cm heterogenous fluid collection that extends from the right popliteal fossa down to the mid posterior calf.  No significant increased surrounding vascularity.                                      Treatments you received were:   - Ace wrap was placed for support  Medications   sodium chloride 0.9% bolus 500 mL (0 mLs Intravenous Stopped 7/11/20 2337)   morphine injection 6 mg (6 mg Intravenous Given 7/11/20 2314)   ondansetron injection 4 mg (4 mg Intravenous Given 7/11/20 2335)   gadobutroL (GADAVIST) injection 10 mL (10 mLs Intravenous Given 7/12/20 0247)   oxyCODONE immediate release tablet 10 mg (10 mg Oral Given 7/12/20 0311)       - Read the provided information about knee pain    Attempt to reduce pain using the RICE  regimen (Rest, Ice, Compression, Elevation):  - Rest: Do not walk or bear weight on your painful knee.To get around, use the crutches that have been provided to you.  - Ice: For pain and swelling, apply ice packs (with a towel over skin) for 15 minutes 3 - 5 times per day  - Compression: To help with swelling, apply Ace wrap as needed  - Elevation: Elevate (boost) your knee to the height of your hip when sitting for at least the first 24 - 48 hours    Home Care Instructions:  Take ibuprofen (also called Advil, Motrin) for your pain. This medicine is available over-the-counter in 200 mg tablets.  - You may take 600 mg every 6 hours, or 800 mg every 8 hours as needed   - Do not take more than this amount, as it can cause kidney problems, bleeding in your stomach, and other serious problems.   - Do not also take naproxen (Aleve) at the same time or on the same day  - If you have heart problems or uncontrolled high blood pressure, you should not take ibuprofen for more than 3 days without discussing with your doctor    If your pain is not controlled with ibuprofen, you may also take acetaminophen (also called Tylenol).  - You may take up to 1,000 mg of Tylenol every 6 hours as needed  - Do not take more than 4,000 mg in 24 hours (1 day) as this may cause liver damage  - Many other medicines include acetaminophen (Tylenol) such as: Norco, Vicodin, Tylenol #3, many cold medicines, etc.  - Please read all labels carefully and do not combine medicines that include acetaminophen.  - If you have a history of liver disease or drink alcohol heavily, do not take acetaminophen (Tylenol) since it can damage your liver    - Medications: Continue taking your home medications as prescribed    Follow-Up Plan:  - Follow-up with: Primary care doctor within 3 - 5 days  - Follow-up for additional testing and/or evaluation as directed by your primary doctor  - If your pain persists, follow-up with the provided orthopedic surgeon group  -  Return to activity as directed by your doctor or orthopedic surgeon    Return to the Emergency Department for symptoms including but not limited to: worsening symptoms, severe pain, shortness of breath or chest pain, vomiting with inability to hold down fluids, fevers greater than 100.4°F, passing out/unconsciousness, or other concerning symptoms.

## 2020-07-12 NOTE — ED PROVIDER NOTES
"Encounter Date: 7/11/2020    SCRIBE #1 NOTE: I, Savana Jain, am scribing for, and in the presence of,  Shira Woodson MD. I have scribed the following portions of the note - Other sections scribed: HPI, ROS, MDM.       History     Chief Complaint   Patient presents with    Leg Pain     RLE pain for the past couple weeks, "swollen calf muscle"     Mr. Parrish is a 35 y.o. male with a history of left knee surgery, chronic back pain, anxiety, depression, and OCD who presents to the ED today with a complaint of right leg pain  And swelling that started 3 weeks ago. He reports pain in the area behind his right knee and radiates down the whole leg. Says that he just woke up like that. Denies any injury or trauma. Pain is severe, constant. Unable to walk because of the pain. No trauma or injuries that started this pain. Pain associated with swelling. Was evaluated on 7/8, US negative for blood clot at that time, but pain and swelling have worsened significantly.      The history is provided by the patient and medical records. No  was used.     Review of patient's allergies indicates:   Allergen Reactions    Flexeril [cyclobenzaprine] Hives     Past Medical History:   Diagnosis Date    ADHD (attention deficit hyperactivity disorder)     Anxiety     Chronic back pain     Depression     Insomnia     Migraine headache     OCD (obsessive compulsive disorder)     Seasonal allergies      Past Surgical History:   Procedure Laterality Date    CIRCUMCISION      KNEE ARTHROSCOPY      PATELLA REALIGNMENT       Family History   Problem Relation Age of Onset    Hypertension Father     Hypertension Paternal Grandfather      Social History     Tobacco Use    Smoking status: Former Smoker     Types: Cigarettes    Smokeless tobacco: Never Used   Substance Use Topics    Alcohol use: Yes     Frequency: 2-4 times a month     Drinks per session: 3 or 4     Binge frequency: Never     Comment: social    " Drug use: No     Review of Systems   Constitutional: Negative for chills and fever.   Cardiovascular: Positive for leg swelling (Right leg).   Musculoskeletal: Positive for myalgias (Right leg).   All other systems reviewed and are negative.      Physical Exam     Initial Vitals [07/11/20 2212]   BP Pulse Resp Temp SpO2   139/65 86 18 97.9 °F (36.6 °C) 97 %      MAP       --         Physical Exam    Nursing note and vitals reviewed.  Constitutional: Vital signs are normal. He appears well-developed and well-nourished.   HENT:   Head: Normocephalic and atraumatic.   Eyes: Conjunctivae are normal.   Neck: Trachea normal and normal range of motion. Neck supple.   Cardiovascular: Normal rate and normal pulses.   Pulmonary/Chest: No tachypnea. No respiratory distress.   Abdominal: Normal appearance.   Musculoskeletal: Normal range of motion. Edema present.      Comments: Significant asymmetry of right LE and calf swelling   Tenderness from popliteal area down   2+ pulse   Neurological: He is alert and oriented to person, place, and time.   Skin: Skin is warm and dry.             ED Course   Procedures  Labs Reviewed   CBC W/ AUTO DIFFERENTIAL - Abnormal; Notable for the following components:       Result Value    Hemoglobin 13.7 (*)     All other components within normal limits   COMPREHENSIVE METABOLIC PANEL - Abnormal; Notable for the following components:    BUN, Bld 26 (*)     All other components within normal limits   CK - Abnormal; Notable for the following components:     (*)     All other components within normal limits   SEDIMENTATION RATE   C-REACTIVE PROTEIN   PROTIME-INR   PROCALCITONIN          Imaging Results          MRI Knee W WO Contrast Right (Final result)  Result time 07/12/20 03:47:05   Procedure changed from MRI Knee With Contrast Right     Final result by Jet Carrasco MD (07/12/20 03:47:05)                 Impression:      Partial-thickness tear involving the medial head of the  gastrocnemius with subcutaneous edema and hematoma along the superficial fascia.    Small suprapatellar joint effusion.    No ligament or meniscal injury in the knee.      Electronically signed by: Jet Carrasco MD  Date:    07/12/2020  Time:    03:47             Narrative:    EXAMINATION:  MRI KNEE W WO CONTRAST RIGHT; MRI TIBIA FIBULA W WO CONTRAST RIGHT    CLINICAL HISTORY:  Knee swelling/redness, cellulitis suspected;; Lower leg swelling/redness, cellulitis suspected;Lower leg pain, osteomyelitis suspected, initial exam;    TECHNIQUE:  Multisequence, multiplanar imaging of the right knee and right tibia/fibula was obtained prior to and after the administration of 10 mL Gadavist intravenous contrast.    COMPARISON:  Knee radiographs, 07/08/2020.  Venous ultrasound, 07/11/2020.    FINDINGS:  RIGHT KNEE MRI:    Menisci: There is no tear of the medial or lateral meniscus.    Ligaments/muscles: ACL, PCL, MCL, and LCL complex are intact.  There is edema signal involving the medial head of the gastrocnemius.    Cartilage:    Patellofemoral: Articular cartilage is maintained.    Medial tibiofemoral: Articular cartilage is maintained.    Lateral tibiofemoral: Articular cartilage is maintained.    Bone: No fracture or marrow replacing process.    Miscellaneous: There is a small suprapatellar joint effusion and nonspecific synovial enhancement.  Enhancement also noted adjacent to the medial head of the gastrocnemius, likely inflammatory.  No additional abnormal enhancement.    RIGHT TIBIA/FIBULA MRI:    Focal tear and edema signal involving the medial head of the gastrocnemius.  Moderate soft tissue edema and fluid collection along the medial superficial aspect of the medial gastrocnemius, likely hematoma.  Hematoma measures up to 6 cm AP by 2 cm transverse dimension and correlates with finding on recent ultrasound.  Mild enhancement in this region.    Calf soft tissues are otherwise within normal limits.  Bone marrow  signal is within normal limits.                               MRI Tibia Fibula W WO Contrast Right (Final result)  Result time 07/12/20 03:47:05   Procedure changed from MRI Tibia Fibula With Contrast Right     Final result by Jet Carrasco MD (07/12/20 03:47:05)                 Impression:      Partial-thickness tear involving the medial head of the gastrocnemius with subcutaneous edema and hematoma along the superficial fascia.    Small suprapatellar joint effusion.    No ligament or meniscal injury in the knee.      Electronically signed by: Jet Carrasco MD  Date:    07/12/2020  Time:    03:47             Narrative:    EXAMINATION:  MRI KNEE W WO CONTRAST RIGHT; MRI TIBIA FIBULA W WO CONTRAST RIGHT    CLINICAL HISTORY:  Knee swelling/redness, cellulitis suspected;; Lower leg swelling/redness, cellulitis suspected;Lower leg pain, osteomyelitis suspected, initial exam;    TECHNIQUE:  Multisequence, multiplanar imaging of the right knee and right tibia/fibula was obtained prior to and after the administration of 10 mL Gadavist intravenous contrast.    COMPARISON:  Knee radiographs, 07/08/2020.  Venous ultrasound, 07/11/2020.    FINDINGS:  RIGHT KNEE MRI:    Menisci: There is no tear of the medial or lateral meniscus.    Ligaments/muscles: ACL, PCL, MCL, and LCL complex are intact.  There is edema signal involving the medial head of the gastrocnemius.    Cartilage:    Patellofemoral: Articular cartilage is maintained.    Medial tibiofemoral: Articular cartilage is maintained.    Lateral tibiofemoral: Articular cartilage is maintained.    Bone: No fracture or marrow replacing process.    Miscellaneous: There is a small suprapatellar joint effusion and nonspecific synovial enhancement.  Enhancement also noted adjacent to the medial head of the gastrocnemius, likely inflammatory.  No additional abnormal enhancement.    RIGHT TIBIA/FIBULA MRI:    Focal tear and edema signal involving the medial head of the  gastrocnemius.  Moderate soft tissue edema and fluid collection along the medial superficial aspect of the medial gastrocnemius, likely hematoma.  Hematoma measures up to 6 cm AP by 2 cm transverse dimension and correlates with finding on recent ultrasound.  Mild enhancement in this region.    Calf soft tissues are otherwise within normal limits.  Bone marrow signal is within normal limits.                               US Lower Extremity Veins Bilateral (Final result)  Result time 07/12/20 00:59:48    Final result by Jet Carrasco MD (07/12/20 00:59:48)                 Impression:      No evidence of deep venous thrombosis in either lower extremity.    Interval development of a 6.0 x 1.2 x 4.3 cm heterogenous fluid collection extending from the right popliteal fossa to the mid posterior calf with no significant increased surrounding vascularity.  Findings are favored to represent a dissecting Baker's cyst or hematoma.  Abscess is felt unlikely in the absence of convincing clinical findings of infection.    Electronically signed by resident: Sin Jones  Date:    07/12/2020  Time:    00:45    Electronically signed by: Jet Carrasco MD  Date:    07/12/2020  Time:    00:59             Narrative:    EXAMINATION:  US LOWER EXTREMITY VEINS BILATERAL    CLINICAL HISTORY:  Pain in right leg    TECHNIQUE:  Duplex and color flow Doppler and dynamic compression was performed of the bilateral lower extremity veins was performed.    COMPARISON:  Ultrasound 07/08/2020.    FINDINGS:  Right thigh veins: The common femoral, femoral, popliteal, upper greater saphenous, and deep femoral veins are patent and free of thrombus. The veins are normally compressible and have normal phasic flow and augmentation response.    Right calf veins: The visualized calf veins are patent.    Left thigh veins: The common femoral, femoral, popliteal, upper greater saphenous, and deep femoral veins are patent and free of thrombus. The  veins are normally compressible and have normal phasic flow and augmentation response.    Left calf veins: The visualized calf veins are patent.    Miscellaneous: Interval development of 6.0 x 1.2 x 4.3 cm heterogenous fluid collection that extends from the right popliteal fossa down to the mid posterior calf.  No significant increased surrounding vascularity.                                 Medical Decision Making:   History:   Old Medical Records: I decided to obtain old medical records.  Initial Assessment:   Evaluation of right leg swelling  Differential Diagnosis:   DVT   myositis   Deep space abscess   Clinical Tests:   Lab Tests: Ordered and Reviewed  Radiological Study: Ordered and Reviewed  ED Management:  First US was negative but symptoms continue to worsen   Significant tenderness on exam. Compartments soft. Did a bedside US, achilles's tendon intact. Would be unusual for tear of muscle or tendon without any trauma, but possible.   Initial plan for lab work and repeat US. If US negative will get MRI to evaluate for other etiologies of diffuse swelling.   Other:   I have discussed this case with another health care provider.            Scribe Attestation:   Scribe #1: I performed the above scribed service and the documentation accurately describes the services I performed. I attest to the accuracy of the note.    Attending Attestation:             Attending ED Notes:   Final disposition pending labs, imaging, turned over to oncoming team.                        Clinical Impression:       ICD-10-CM ICD-9-CM   1. Rupture of right gastrocnemius tendon, initial encounter  S86.111A 844.8   2. Right leg pain  M79.604 729.5         Disposition:   Disposition: Discharged  Condition: Stable     ED Disposition Condition    Discharge Stable        ED Prescriptions     Medication Sig Dispense Start Date End Date Auth. Provider    ibuprofen (ADVIL,MOTRIN) 600 MG tablet Take 1 tablet (600 mg total) by mouth every 6  (six) hours as needed for Pain. 20 tablet 7/12/2020 7/17/2020 Chacha Perea MD    HYDROcodone-acetaminophen (NORCO) 5-325 mg per tablet Take 1 tablet by mouth every 6 (six) hours as needed for Pain. 12 tablet 7/12/2020 7/15/2020 Chacha Perea MD        Follow-up Information     Follow up With Specialties Details Why Contact Info Additional Information    Jose Angel Ca MD Family Medicine, Sports Medicine Schedule an appointment as soon as possible for a visit in 1 week As needed [address]  [facility] [address]  [phone]       [facility] Orthopedics Schedule an appointment as soon as possible for a visit in 1 week  [address]  [facility] [address]  [phone] Pending sale to [facility] - 5th Floor                                     Shira Woodson MD  07/12/20 6317

## 2020-07-12 NOTE — PROVIDER PROGRESS NOTES - EMERGENCY DEPT.
I received sign-out on this patient.  This is a 35-year-old male presenting to ER with complaint of right lower extremity pain and swelling for approximately 10 days.  He was seen on the 8th and had an ultrasound done at that time which was normal.  Now the swelling and pain are worse.  The calf is extremely tender to palpation.  He received morphine for his pain.  Differential diagnosis at the time of evaluation by Dr. Woodson includes DVT, myositis, abscess.  Compartments are soft.    At time of sign-out, ultrasound and MRI of the area are pending for final disposition.    MRI Knee W WO Contrast Right   Final Result      Partial-thickness tear involving the medial head of the gastrocnemius with subcutaneous edema and hematoma along the superficial fascia.      Small suprapatellar joint effusion.      No ligament or meniscal injury in the knee.         Electronically signed by: Jet Carrasco MD   Date:    07/12/2020   Time:    03:47      MRI Tibia Fibula W WO Contrast Right   Final Result      Partial-thickness tear involving the medial head of the gastrocnemius with subcutaneous edema and hematoma along the superficial fascia.      Small suprapatellar joint effusion.      No ligament or meniscal injury in the knee.         Electronically signed by: Jet Carrasco MD   Date:    07/12/2020   Time:    03:47      US Lower Extremity Veins Bilateral   Final Result      No evidence of deep venous thrombosis in either lower extremity.      Interval development of a 6.0 x 1.2 x 4.3 cm heterogenous fluid collection extending from the right popliteal fossa to the mid posterior calf with no significant increased surrounding vascularity.  Findings are favored to represent a dissecting Baker's cyst or hematoma.  Abscess is felt unlikely in the absence of convincing clinical findings of infection.      Electronically signed by resident: Sin Jones   Date:    07/12/2020   Time:    00:45      Electronically signed  by: Jet Carrasco MD   Date:    07/12/2020   Time:    00:59        MRI shows a gastrocnemius tear along the medial head.  There is edema and hematoma along with superficial fascia.  Will place an Ace wrap.  Will discharge home with pain control with Norco and ibuprofen.  An ambulatory referral was placed for Orthopedic surgery.  Patient already has crutches.    Discharged home in stable condition.  Return precautions discussed at bedside.  Safe use of opioid pain medication discussed at bedside.

## 2020-07-13 ENCOUNTER — TELEPHONE (OUTPATIENT)
Dept: INTERNAL MEDICINE | Facility: CLINIC | Age: 35
End: 2020-07-13

## 2020-07-13 ENCOUNTER — PATIENT OUTREACH (OUTPATIENT)
Dept: ADMINISTRATIVE | Facility: OTHER | Age: 35
End: 2020-07-13

## 2020-07-13 ENCOUNTER — OFFICE VISIT (OUTPATIENT)
Dept: ORTHOPEDICS | Facility: CLINIC | Age: 35
End: 2020-07-13
Payer: MEDICARE

## 2020-07-13 VITALS
HEART RATE: 72 BPM | HEIGHT: 73 IN | WEIGHT: 216 LBS | SYSTOLIC BLOOD PRESSURE: 104 MMHG | DIASTOLIC BLOOD PRESSURE: 68 MMHG | BODY MASS INDEX: 28.63 KG/M2 | TEMPERATURE: 98 F

## 2020-07-13 DIAGNOSIS — S86.111A GASTROCNEMIUS TEAR, RIGHT, INITIAL ENCOUNTER: ICD-10-CM

## 2020-07-13 DIAGNOSIS — M25.511 CHRONIC RIGHT SHOULDER PAIN: Primary | ICD-10-CM

## 2020-07-13 DIAGNOSIS — G89.29 CHRONIC RIGHT SHOULDER PAIN: Primary | ICD-10-CM

## 2020-07-13 PROCEDURE — 99999 PR PBB SHADOW E&M-EST. PATIENT-LVL III: CPT | Mod: PBBFAC,,, | Performed by: PHYSICIAN ASSISTANT

## 2020-07-13 PROCEDURE — 99999 PR PBB SHADOW E&M-EST. PATIENT-LVL III: ICD-10-PCS | Mod: PBBFAC,,, | Performed by: PHYSICIAN ASSISTANT

## 2020-07-13 PROCEDURE — 99204 OFFICE O/P NEW MOD 45 MIN: CPT | Mod: S$PBB,,, | Performed by: PHYSICIAN ASSISTANT

## 2020-07-13 PROCEDURE — 99213 OFFICE O/P EST LOW 20 MIN: CPT | Mod: PBBFAC | Performed by: PHYSICIAN ASSISTANT

## 2020-07-13 PROCEDURE — 99204 PR OFFICE/OUTPT VISIT, NEW, LEVL IV, 45-59 MIN: ICD-10-PCS | Mod: S$PBB,,, | Performed by: PHYSICIAN ASSISTANT

## 2020-07-13 RX ORDER — TIZANIDINE 4 MG/1
4 TABLET ORAL EVERY 6 HOURS PRN
Qty: 20 TABLET | Refills: 0 | Status: SHIPPED | OUTPATIENT
Start: 2020-07-13 | End: 2020-07-23

## 2020-07-13 RX ORDER — MELOXICAM 15 MG/1
15 TABLET ORAL DAILY
Qty: 14 TABLET | Refills: 0 | Status: SHIPPED | OUTPATIENT
Start: 2020-07-13 | End: 2020-08-12

## 2020-07-13 NOTE — LETTER
July 13, 2020      Chacha Perea MD  [address]  [facility] [address]           [facility]  [address]  [facility] [address]  Phone: [phone]  Fax: [phone]          Patient: Buddy Parrish   MR Number: [id number]   YOB: 1985   Date of Visit: 7/13/2020       Dear Dr. Chacha Perea:    Thank you for referring Buddy Parrish to me for evaluation. Attached you will find relevant portions of my assessment and plan of care.    If you have questions, please do not hesitate to call me. I look forward to following Buddy Parrish along with you.    Sincerely,    Jackie Mueller PA-C    Enclosure  CC:  No Recipients    If you would like to receive this communication electronically, please contact [email or web address] or [phone] to request more information on [employer] Link access.    For providers and/or their staff who would like to refer a patient to [facility], please contact us through our one-stop-shop provider referral line, [facility], at [phone].    If you feel you have received this communication in error or would no longer like to receive these types of communications, please e-mail [email or web address]

## 2020-07-13 NOTE — PROGRESS NOTES
Requested updates within Care Everywhere.  Patient's chart was reviewed for overdue DANITA topics.  Immunizations reconciled.    Orders placed:n/a  Labs Linked:n/a

## 2020-07-13 NOTE — PROGRESS NOTES
Subjective:      Patient ID: Buddy Parrish is a 35 y.o. male.    Chief Complaint: Pain and Swelling of the Right Knee and Pain of the Right Shoulder    HPI  35 year old male presents with chief complaint of right knee pain x 1 month. He denies trauma. Pain is mostly at the posterior knee and calf. It is worse with walking and prolonged standing. He has taken ibuprofen and norco with little relief. Ace wrapping the calf helps. He reports swelling, popping, locking, catching, and giving way. He is using crutches. He had U/S on 7-12 that showed possible baker's cyst vs hematoma. MRI was done on 7-12 which showed gastrocnemius partial tear. He had left knee scope in 2015 by Dr. Grant for torn ACL and meniscus.   Patient reports right shoulder pain x couple of months. He is RHD and denies trauma. He cuts grass for work. Pain is worse with picking up heavy things. Ibuprofen gives little relief. He did PT for about 4 weeks with no relief. He has continued HEP.   Review of Systems   Constitution: Negative for chills, fever and night sweats.   Cardiovascular: Negative for chest pain.   Respiratory: Negative for cough and shortness of breath.    Hematologic/Lymphatic: Does not bruise/bleed easily.   Skin: Negative for color change.   Gastrointestinal: Negative for heartburn.   Genitourinary: Negative for dysuria.   Neurological: Negative for numbness and paresthesias.   Psychiatric/Behavioral: Negative for altered mental status.   Allergic/Immunologic: Negative for persistent infections.         Objective:            General    Vitals reviewed.  Constitutional: He is oriented to person, place, and time. He appears well-developed and well-nourished.   HENT:   Head: Atraumatic.   Cardiovascular: Normal rate.    Pulmonary/Chest: Effort normal.   Neurological: He is alert and oriented to person, place, and time.   Psychiatric: He has a normal mood and affect.         Right Shoulder Exam     Range of Motion   Active abduction:   110 abnormal   Forward Flexion:  160 abnormal   External Rotation 0 degrees: normal   Internal rotation 0 degrees: abnormal     Tests & Signs   Maciel test: positive  Impingement: negative  Rotator Cuff Painful Arc/Range: moderate  Speed's Test: positive (mild)    Other   Sensation: normal    Comments:  Positive empty can test.     Muscle Strength   Right Upper Extremity   Shoulder Abduction: 4/5   Supraspinatus: 4/5/5   Biceps: 5/5/5     General :   alert, appears stated age and cooperative   Gait: On crutches. The patient can bear weight on the injured extremity.   Right Lower Extremity  Hip Palpation:  no tenderness over the greater  trochanter   Hip ROM: 100% of normal    Knee Effusion:  1+. No warmth or erythema.   Ecchymosis:  none   Knee ROM:  0 to 90 degrees without subpatellar   crepitance.   Patella:  Patella does track normally.  Patellar apprehension test: negative  Patellar compression test: negative   Tenderness: medial joint line, lateral joint line and posterior knee. Also calf.   Stability:  Lachman's test: negative  Posterior drawer: negative  Medial collateral ligament: negative  Lateral collateral ligament: negative         Dina's Test:  positive    Sensation:   intact to light touch   Pulses: normal DP and PT pulses       X-ray knee: reviewed by myself. There is an osteochondroma of the proximal tibia.  No fracture dislocation bone destruction or OCD seen.  X-ray shoulder: reviewed by myself. No fracture dislocation bone destruction seen.  No trauma seen.    MRI knee: Partial-thickness tear involving the medial head of the gastrocnemius with subcutaneous edema and hematoma along the superficial fascia.   Small suprapatellar joint effusion.   No ligament or meniscal injury in the knee      Assessment:       Encounter Diagnoses   Name Primary?    Chronic right shoulder pain Yes    Gastrocnemius tear, right, initial encounter           Plan:       MRI right shoulder was ordered to r/o RCT.  He will take mobic 15 mg x 2 weeks. He will take zanaflex as needed. Rest and elevate leg. Alternate ice and heat. He declined a CAM boot. Continue crutches as needed. RTC pending MRI results.

## 2020-07-22 ENCOUNTER — TELEPHONE (OUTPATIENT)
Dept: INTERNAL MEDICINE | Facility: CLINIC | Age: 35
End: 2020-07-22

## 2020-07-23 ENCOUNTER — TELEPHONE (OUTPATIENT)
Dept: INTERNAL MEDICINE | Facility: CLINIC | Age: 35
End: 2020-07-23

## 2020-07-23 DIAGNOSIS — M25.461 EFFUSION OF RIGHT KNEE: ICD-10-CM

## 2020-07-23 DIAGNOSIS — M25.511 RIGHT SHOULDER PAIN, UNSPECIFIED CHRONICITY: ICD-10-CM

## 2020-07-23 DIAGNOSIS — R74.8 ELEVATED CPK: Primary | ICD-10-CM

## 2020-07-23 NOTE — TELEPHONE ENCOUNTER
Please schedule CPK blood test in 2 weeks.    Please see referral orders and please call patient to schedule a consult with Dr. Ayon. Thank you.

## 2020-07-23 NOTE — TELEPHONE ENCOUNTER
Called patient and discussed labs and or test results. Patient expressed understanding and had the opportunity to ask questions. Any questions were answered. See meds, orders, follow up and instructions sections of encounter.    Specific issues include:Discussed labs.  After seeing me he went to the emergency room he had his ultrasound that was negative.  He subsequently saw Ortho and also had MRIs which I reviewed.  Showed what appears to be a calf strain.  But there was no trauma.  This is curious.  We discussed the course.  He will have trouble financing physical therapy.  He may consider a 2nd opinion with sports medicine.  Also will check his CPK again in a couple weeks for a very mild elevation.  Unclear what is causing the problem but the remainder of his rheumatology workup was negative.

## 2020-07-31 NOTE — PROGRESS NOTES
CC: RIGHT shoulder and knee pain     35 y.o. Male with a history of RIGHT shoulder pain and RIGHT knee pain who denies any specific previous injury to either extremity. Patient reports playing contact sports in his youth and worked previously in manual labor as a , but has not worked since the beginning of the year due to COVID-19.  He also has not worked due to joint pain.  No specific injury mechanism.  He was seen in the general ortho dept for the knee and found to have a gastroc strain.  Knee pain is localized over the proximal calf posteriorly. He states that the pain is significant and not responding to any conservative care.  Diffuse right shoulder pain as well.  Intermittent with activity.  Right shoulder pain is worse with overhead activity and right knee pain is worse with walking long distances.  No history of right shoulder instability.  No subjective instability complaints.  No prior formal therapy or injections.  He does have a history of chronic back pain and depression/anxiety.  No numbness or tingling down arm in association with complaint.    Past medical history is notable for previous left knee pain and pathology with ACL reconstruction and meniscal work by my colleague Dr. Parth Grant.  No left knee complaints at this time.    Pain Score:   4    PAST MEDICAL HISTORY:   Past Medical History:   Diagnosis Date    ADHD (attention deficit hyperactivity disorder)     Anxiety     Chronic back pain     Depression     Insomnia     Migraine headache     OCD (obsessive compulsive disorder)     Seasonal allergies      PAST SURGICAL HISTORY:  Past Surgical History:   Procedure Laterality Date    CIRCUMCISION      KNEE ARTHROSCOPY      PATELLA REALIGNMENT       FAMILY HISTORY:  Family History   Problem Relation Age of Onset    Hypertension Father     Hypertension Paternal Grandfather      MEDICATIONS:    Current Outpatient Medications:     methylPREDNISolone (MEDROL DOSEPACK) 4 mg  "tablet, use as directed, Disp: 1 Package, Rfl: 0    mupirocin (BACTROBAN) 2 % ointment, Apply topically 3 (three) times daily., Disp: 15 g, Rfl: 0    orphenadrine (NORFLEX) 100 mg tablet, Take 1 tablet (100 mg total) by mouth 2 (two) times daily as needed for Muscle spasms or Pain., Disp: 20 tablet, Rfl: 0    ALLERGIES:  Review of patient's allergies indicates:   Allergen Reactions    Flexeril [cyclobenzaprine] Hives     REVIEW OF SYSTEMS:  Constitution: Negative. Negative for chills, fever and night sweats.    Hematologic/Lymphatic: Negative for bleeding problem. Does not bruise/bleed easily.   Skin: Negative for dry skin, itching and rash.   Musculoskeletal: Negative for falls. Positive for right shoulder pain and  muscle weakness.     PHYSICAL EXAMINATION:  Vitals:  /74   Pulse 73   Ht 6' 1" (1.854 m)   Wt 99.8 kg (220 lb)   BMI 29.03 kg/m²    General: Well-developed well-nourished 35 y.o. malein no acute distress   Cardiovascular: Regular rhythm by palpation of distal pulse, normal color and temperature, no concerning varicosities on symptomatic side   Lungs: No labored breathing or wheezing appreciated   Neuro: Alert and oriented ×3   Psychiatric: well oriented to person, place and time, demonstrates normal mood and affect   Skin: No rashes, lesions or ulcers, normal temperature, turgor, and texture on uninvolved extremity    Ortho/SPM Exam   Exam of the right shoulder shows full active and passive range of motion.  Prominent tenderness over the proximal biceps groove.  Pain over the anterolateral shoulder with terminal overhead range of motion.  Positive external impingement findings.  Intact cuff strength and no pain on resistance testing.  Positive biceps tension signs.  Negative AC joint.  Negative cross chest adduction maneuver.  Negative anterior apprehension.  Stable anterior-posterior load shift testing.      Exam of the right knee and lower leg demonstrates point tenderness over the medial " gastroc proximally.  No pain with ankle plantar flexion.  Mild pain with forced passive dorsiflexion.  No bruising or palpable focal areas of swelling.  No generalized swelling concerning for DVT.  No medial or lateral joint line tenderness.  Ligamentously stable knee.  No effusion.  Intact extensor mechanism.  Able to perform a double heel rise.  Difficulty with single heel rise.    IMAGING:  Xrays including AP, Outlet and Axillary Lateral of RIGHT shoulder are ordered / images reviewed by me:    Negative    MRI of RIGHT shoulder previously ordered by 2 different providers but not completed by the patient.    MRI of the right knee and tib/fib:  Partial-thickness tear involving the medial head of the gastrocnemius with subcutaneous edema and hematoma along the superficial fascia.     Small suprapatellar joint effusion.     No ligament or meniscal injury in the knee.      ASSESSMENT:      ICD-10-CM ICD-9-CM   1. Gastrocnemius strain, right, initial encounter  S86.111A 844.8   2. Chronic right shoulder pain  M25.511 719.41    G89.29 338.29   3. Chronic pain of right knee  M25.561 719.46    G89.29 338.29       PLAN:     The patient presents with multiple musculoskeletal complaints.  Discussed findings regarding the right knee and lower leg.  Conservative care was recommended. Discussed walking boot but decided against due to minimal pain with ambulation. Rx for Naprosyn, medrol dose pack provided. PT as ordered.  Right shoulder pain appears more biceps labral related.  Intact cuff strength on exam.  Stable shoulder.  No further workup needed at this time from my perspective.  Conservative care recommended to include therapy.  The Medrol Dosepak should help with that as well.  Return to clinic as needed.  May follow up with one of the mid-level providers.    Procedures

## 2020-08-02 ENCOUNTER — PATIENT OUTREACH (OUTPATIENT)
Dept: ADMINISTRATIVE | Facility: OTHER | Age: 35
End: 2020-08-02

## 2020-08-04 ENCOUNTER — OFFICE VISIT (OUTPATIENT)
Dept: SPORTS MEDICINE | Facility: CLINIC | Age: 35
End: 2020-08-04
Attending: FAMILY MEDICINE
Payer: MEDICARE

## 2020-08-04 ENCOUNTER — LAB VISIT (OUTPATIENT)
Dept: LAB | Facility: HOSPITAL | Age: 35
End: 2020-08-04
Attending: FAMILY MEDICINE
Payer: MEDICARE

## 2020-08-04 VITALS
BODY MASS INDEX: 29.16 KG/M2 | HEART RATE: 73 BPM | HEIGHT: 73 IN | SYSTOLIC BLOOD PRESSURE: 118 MMHG | DIASTOLIC BLOOD PRESSURE: 74 MMHG | WEIGHT: 220 LBS

## 2020-08-04 DIAGNOSIS — M25.511 CHRONIC RIGHT SHOULDER PAIN: ICD-10-CM

## 2020-08-04 DIAGNOSIS — G89.29 CHRONIC PAIN OF RIGHT KNEE: ICD-10-CM

## 2020-08-04 DIAGNOSIS — G89.29 CHRONIC RIGHT SHOULDER PAIN: ICD-10-CM

## 2020-08-04 DIAGNOSIS — S86.111A GASTROCNEMIUS STRAIN, RIGHT, INITIAL ENCOUNTER: Primary | ICD-10-CM

## 2020-08-04 DIAGNOSIS — R74.8 ELEVATED CPK: ICD-10-CM

## 2020-08-04 DIAGNOSIS — M25.561 CHRONIC PAIN OF RIGHT KNEE: ICD-10-CM

## 2020-08-04 LAB — CK SERPL-CCNC: 264 U/L (ref 20–200)

## 2020-08-04 PROCEDURE — 36415 COLL VENOUS BLD VENIPUNCTURE: CPT

## 2020-08-04 PROCEDURE — 99999 PR PBB SHADOW E&M-EST. PATIENT-LVL IV: CPT | Mod: PBBFAC,,, | Performed by: ORTHOPAEDIC SURGERY

## 2020-08-04 PROCEDURE — 99204 OFFICE O/P NEW MOD 45 MIN: CPT | Mod: S$PBB,,, | Performed by: ORTHOPAEDIC SURGERY

## 2020-08-04 PROCEDURE — 99999 PR PBB SHADOW E&M-EST. PATIENT-LVL IV: ICD-10-PCS | Mod: PBBFAC,,, | Performed by: ORTHOPAEDIC SURGERY

## 2020-08-04 PROCEDURE — 82550 ASSAY OF CK (CPK): CPT

## 2020-08-04 PROCEDURE — 99204 PR OFFICE/OUTPT VISIT, NEW, LEVL IV, 45-59 MIN: ICD-10-PCS | Mod: S$PBB,,, | Performed by: ORTHOPAEDIC SURGERY

## 2020-08-04 PROCEDURE — 99214 OFFICE O/P EST MOD 30 MIN: CPT | Mod: PBBFAC | Performed by: ORTHOPAEDIC SURGERY

## 2020-08-04 RX ORDER — METHYLPREDNISOLONE 4 MG/1
TABLET ORAL
Qty: 1 PACKAGE | Refills: 0 | Status: SHIPPED | OUTPATIENT
Start: 2020-08-04 | End: 2020-08-25

## 2020-08-04 RX ORDER — NAPROXEN 500 MG/1
500 TABLET ORAL 2 TIMES DAILY
Qty: 30 TABLET | Refills: 2 | OUTPATIENT
Start: 2020-08-04 | End: 2020-08-05

## 2020-08-04 NOTE — LETTER
August 15, 2020      Jose Angel Ca MD  [address]  [facility] [address]           [facility]  [address]  [facility] [address]  Phone: [phone]          Patient: Buddy Parrish   MR Number: [id number]   YOB: 1985   Date of Visit: 8/4/2020       Dear Dr. Jose Angel Ca:    Thank you for referring Buddy Parrish to me for evaluation. Attached you will find relevant portions of my assessment and plan of care.    If you have questions, please do not hesitate to call me. I look forward to following Buddy Parrish along with you.    Sincerely,    KEVIN Ayon MD    Enclosure  CC:  No Recipients    If you would like to receive this communication electronically, please contact [email or web address] or [phone] to request more information on [employer] Link access.    For providers and/or their staff who would like to refer a patient to [facility], please contact us through our one-stop-shop provider referral line, [facility] , at [phone].    If you feel you have received this communication in error or would no longer like to receive these types of communications, please e-mail [email or web address]

## 2020-08-05 ENCOUNTER — HOSPITAL ENCOUNTER (EMERGENCY)
Facility: HOSPITAL | Age: 35
Discharge: HOME | End: 2020-08-05
Attending: EMERGENCY MEDICINE
Payer: MEDICARE

## 2020-08-05 VITALS
RESPIRATION RATE: 18 BRPM | SYSTOLIC BLOOD PRESSURE: 117 MMHG | OXYGEN SATURATION: 99 % | WEIGHT: 220 LBS | HEART RATE: 68 BPM | HEIGHT: 73 IN | DIASTOLIC BLOOD PRESSURE: 58 MMHG | TEMPERATURE: 98 F | BODY MASS INDEX: 29.16 KG/M2

## 2020-08-05 DIAGNOSIS — S01.01XA LACERATION OF SCALP, INITIAL ENCOUNTER: ICD-10-CM

## 2020-08-05 DIAGNOSIS — F10.929 ALCOHOLIC INTOXICATION WITH COMPLICATION: ICD-10-CM

## 2020-08-05 DIAGNOSIS — S49.91XA RIGHT SHOULDER INJURY: ICD-10-CM

## 2020-08-05 DIAGNOSIS — S09.90XA INJURY OF HEAD, INITIAL ENCOUNTER: Primary | ICD-10-CM

## 2020-08-05 PROCEDURE — 99284 EMERGENCY DEPT VISIT MOD MDM: CPT | Mod: 25

## 2020-08-05 PROCEDURE — 25000003 PHARM REV CODE 250: Performed by: NURSE PRACTITIONER

## 2020-08-05 PROCEDURE — 63600175 PHARM REV CODE 636 W HCPCS: Performed by: NURSE PRACTITIONER

## 2020-08-05 PROCEDURE — 90715 TDAP VACCINE 7 YRS/> IM: CPT | Performed by: NURSE PRACTITIONER

## 2020-08-05 PROCEDURE — 90471 IMMUNIZATION ADMIN: CPT | Performed by: NURSE PRACTITIONER

## 2020-08-05 RX ORDER — NAPROXEN 500 MG/1
500 TABLET ORAL 2 TIMES DAILY PRN
Qty: 10 TABLET | Refills: 0 | Status: SHIPPED | OUTPATIENT
Start: 2020-08-05 | End: 2020-08-10

## 2020-08-05 RX ORDER — LIDOCAINE HYDROCHLORIDE 10 MG/ML
10 INJECTION INFILTRATION; PERINEURAL
Status: COMPLETED | OUTPATIENT
Start: 2020-08-05 | End: 2020-08-05

## 2020-08-05 RX ORDER — ACETAMINOPHEN 325 MG/1
650 TABLET ORAL
Status: COMPLETED | OUTPATIENT
Start: 2020-08-05 | End: 2020-08-05

## 2020-08-05 RX ORDER — ORPHENADRINE CITRATE 100 MG/1
100 TABLET, EXTENDED RELEASE ORAL 2 TIMES DAILY PRN
Qty: 20 TABLET | Refills: 0 | Status: SHIPPED | OUTPATIENT
Start: 2020-08-05 | End: 2020-08-15

## 2020-08-05 RX ORDER — MUPIROCIN 20 MG/G
OINTMENT TOPICAL 3 TIMES DAILY
Qty: 15 G | Refills: 0 | Status: SHIPPED | OUTPATIENT
Start: 2020-08-05

## 2020-08-05 RX ORDER — BACITRACIN 500 [USP'U]/G
1 OINTMENT TOPICAL
Status: COMPLETED | OUTPATIENT
Start: 2020-08-05 | End: 2020-08-05

## 2020-08-05 RX ADMIN — LIDOCAINE HYDROCHLORIDE 10 ML: 10 INJECTION, SOLUTION INFILTRATION; PERINEURAL at 06:08

## 2020-08-05 RX ADMIN — ACETAMINOPHEN 650 MG: 325 TABLET ORAL at 07:08

## 2020-08-05 RX ADMIN — CLOSTRIDIUM TETANI TOXOID ANTIGEN (FORMALDEHYDE INACTIVATED), CORYNEBACTERIUM DIPHTHERIAE TOXOID ANTIGEN (FORMALDEHYDE INACTIVATED), BORDETELLA PERTUSSIS TOXOID ANTIGEN (GLUTARALDEHYDE INACTIVATED), BORDETELLA PERTUSSIS FILAMENTOUS HEMAGGLUTININ ANTIGEN (FORMALDEHYDE INACTIVATED), BORDETELLA PERTUSSIS PERTACTIN ANTIGEN, AND BORDETELLA PERTUSSIS FIMBRIAE 2/3 ANTIGEN 0.5 ML: 5; 2; 2.5; 5; 3; 5 INJECTION, SUSPENSION INTRAMUSCULAR at 07:08

## 2020-08-05 RX ADMIN — BACITRACIN 1 G: 500 OINTMENT TOPICAL at 07:08

## 2020-08-05 NOTE — ED PROVIDER NOTES
Encounter Date: 8/5/2020    SCRIBE #1 NOTE: I, Estephania Calvillo, am scribing for, and in the presence of,  HELDER Pollock. I have scribed the following portions of the note - Other sections scribed: HPI, ROS, PE.       History     Chief Complaint   Patient presents with    Head Laceration     Pt c/o a laceration to the right side of head after falling around 0140, pt unsure of how he fell or what his head struck, pt admits to drinking alcohol. Pt reports LOC, dizziness and difficulty staying awake.      This is a 35 y.o male who has Migraine headache and Chronic back pain presents to the ED for an evaluation for a laceration to the head secondary to a fall that occurred at 0140.  Patient admits to drinking alcohol, in which he consumed 1 large daiquiri and 4 beers.  He states he attempted to stand when he fell and hit his head.  He reports he is unsure of any loss of consciousness.  He also reports of right shoulder pain.  He denies fever, chills, emesis, abdominal pain, chest pain, back pain, blurred vision, or any other associated symptoms.  No prior tx.  No alleviating factors.  He is unsure of his last updated tetanus.     The history is provided by the patient.     Review of patient's allergies indicates:   Allergen Reactions    Flexeril [cyclobenzaprine] Hives     Past Medical History:   Diagnosis Date    ADHD (attention deficit hyperactivity disorder)     Anxiety     Chronic back pain     Depression     Insomnia     Migraine headache     OCD (obsessive compulsive disorder)     Seasonal allergies      Past Surgical History:   Procedure Laterality Date    CIRCUMCISION      KNEE ARTHROSCOPY      PATELLA REALIGNMENT       Family History   Problem Relation Age of Onset    Hypertension Father     Hypertension Paternal Grandfather      Social History     Tobacco Use    Smoking status: Former Smoker     Types: Cigarettes    Smokeless tobacco: Never Used   Substance Use Topics    Alcohol use: Yes      Frequency: 2-4 times a month     Drinks per session: 3 or 4     Binge frequency: Never     Comment: social    Drug use: Yes     Types: Marijuana     Review of Systems   Constitutional: Negative for chills and fever.   HENT: Negative for congestion, ear pain, rhinorrhea and sore throat.    Eyes: Negative for pain and visual disturbance.   Respiratory: Negative for cough and shortness of breath.    Cardiovascular: Negative for chest pain.   Gastrointestinal: Negative for abdominal pain, diarrhea, nausea and vomiting.   Genitourinary: Negative for dysuria.   Musculoskeletal: Positive for arthralgias. Negative for back pain and neck pain.   Skin: Positive for wound. Negative for rash.   Neurological: Negative for weakness, numbness and headaches.       Physical Exam     Initial Vitals [08/05/20 0218]   BP Pulse Resp Temp SpO2   137/69 97 14 98.8 °F (37.1 °C) 98 %      MAP       --         Physical Exam    Nursing note and vitals reviewed.  Constitutional: He appears well-developed and well-nourished. He is not diaphoretic. No distress.   HENT:   Head: Normocephalic. Head is with laceration.       Right Ear: External ear normal.   Left Ear: External ear normal.   Mouth/Throat: Oropharynx is clear and moist. No oropharyngeal exudate.   Eyes: Conjunctivae and EOM are normal. Pupils are equal, round, and reactive to light. Right eye exhibits no discharge. Left eye exhibits no discharge.   Neck: Normal range of motion. Neck supple. No JVD present.   Cardiovascular: Normal rate, regular rhythm, normal heart sounds and intact distal pulses. Exam reveals no gallop and no friction rub.    No murmur heard.  Pulmonary/Chest: Breath sounds normal. No respiratory distress. He has no wheezes. He has no rhonchi. He has no rales.   Abdominal: Soft. Bowel sounds are normal. He exhibits no distension. There is no abdominal tenderness. There is no rebound and no guarding.   Musculoskeletal: Normal range of motion. No tenderness or  edema.      Right shoulder: He exhibits normal range of motion and no tenderness.      Right elbow: Normal.     Right wrist: Normal.      Cervical back: Normal.      Thoracic back: Normal.      Lumbar back: Normal.      Comments: Right shoulder with full range of motion with no tenderness.  No obvious deformity.  Good pulses and brisk capillary refill.   Lymphadenopathy:     He has no cervical adenopathy.   Neurological: He is alert and oriented to person, place, and time. He has normal strength. No cranial nerve deficit or sensory deficit. He displays a negative Romberg sign. GCS eye subscore is 4. GCS verbal subscore is 5. GCS motor subscore is 6.   Skin: Skin is warm and dry.   Psychiatric: He has a normal mood and affect. Thought content normal.         ED Course   Procedures  Labs Reviewed - No data to display       Imaging Results          CT Head Without Contrast (Final result)  Result time 08/05/20 04:38:09    Final result by Almas Gupta MD (08/05/20 04:38:09)                 Impression:      There is no evidence for acute intracranial process.      Electronically signed by: Almas Gupta  Date:    08/05/2020  Time:    04:38             Narrative:    EXAMINATION:  CT HEAD WITHOUT CONTRAST    CLINICAL HISTORY:  Head trauma, penetrating;    TECHNIQUE:  Low dose axial images were obtained through the head.  Coronal and sagittal reformations were also performed. Contrast was not administered.    COMPARISON:  November 28, 2018    FINDINGS:  The ventricular system, sulcal pattern and parenchymal attenuation characteristics appear stable when compared to the prior examination without evidence for superimposed acute process.  There is no evidence for intracranial mass, mass effect or midline shift and there is no evidence for acute intracranial hemorrhage.  Appropriate CSF spaces are seen at the skull base.    The mastoid air cells appear appropriate when accounting for averaging, as do the paranasal  sinuses.  The osseous structures appear intact, the orbits appear intact.                               CT Cervical Spine Without Contrast (Final result)  Result time 08/05/20 04:45:19    Final result by Almas Gupta MD (08/05/20 04:45:19)                 Impression:      Chronic changes are noted, correlation for any specific level of symptomatology is needed.    On close evaluation of available imaging there is no evidence for acute cervical spine fracture deformity.      Electronically signed by: Almas Gupta  Date:    08/05/2020  Time:    04:45             Narrative:    EXAMINATION:  CT CERVICAL SPINE WITHOUT CONTRAST    CLINICAL HISTORY:  Neck trauma, intoxicated or obtunded (Age < 65y);    TECHNIQUE:  Low dose axial images, sagittal and coronal reformations were performed though the cervical spine.  Contrast was not administered.    COMPARISON:  None    FINDINGS:  There is straightening of the cervical spine, there is mild chronic appearing endplate change noted, there is no evidence for high-grade spondylolisthesis, there is no evidence for high-grade or acute compression fracture deformity, there is no evidence for facet dislocation or facet fracture deformity.  The occipital condyles articulate appropriately with the superior articular facets of C1 at the craniocervical junction.    Chronic changes of the cervical spine are noted, with disc osteophyte disease noted, most notable at C4-5 with mild spinal canal narrowing.  There is also uncovertebral spurring and foraminal narrowing most notably on the left at C4-5.  On close evaluation of available imaging there is no evidence for acute cervical spine fracture deformity.                                 Medical Decision Making:   ED Management:  35-year-old male presenting ED with head injury after falling while intoxicated.  On my exam patient is awake, alert, and in no apparent distress. Vital signs within normal limits. Head is normocephalic.   Scalp with small wound with any large lacerations requiring suturing.  Wound care provided antibiotic ointment was applied.  Small hematoma noted.  PERRLA.  EOMI without limitation or pain. No periorbital ecchymosis or step-off.  Ear canals are patent. TMs clear.  No Escobar sign. No hemotympanum.  Nose and face are atraumatic.  C-spine cleared per protocol.  No midline tenderness.  All extremities with full range of motion.  Offered to obtain a shoulder x-ray as pain is complaining of pain, or patient declines stating he wanted follow up with his orthopedist. No obvious deformity.  Full range of motion.  Doubt fracture dislocation.  He is neurovascularly intact.  Will discharge patient home with head injury precautions given.  He will follow up with his PCP.  Return to emergency for for any new or worsening symptoms.    Patient clinically sober by the time I evaluated the patient at the time of discharge.            Scribe Attestation:   Scribe #1: I performed the above scribed service and the documentation accurately describes the services I performed. I attest to the accuracy of the note.                          Clinical Impression:       ICD-10-CM ICD-9-CM   1. Injury of head, initial encounter  S09.90XA 959.01   2. Right shoulder injury  S49.91XA 959.2   3. Laceration of scalp, initial encounter  S01.01XA 873.0   4. Alcoholic intoxication with complication  F10.929 305.00         Disposition:   Disposition: Discharged  Condition: Stable     ED Disposition Condition    Discharge Stable        ED Prescriptions     Medication Sig Dispense Start Date End Date Auth. Provider    mupirocin (BACTROBAN) 2 % ointment Apply topically 3 (three) times daily. 15 g 8/5/2020  Brie Mcdonald NP    naproxen (NAPROSYN) 500 MG tablet Take 1 tablet (500 mg total) by mouth 2 (two) times daily as needed (Pain). Take with food 10 tablet 8/5/2020 8/10/2020 Brie Mcdonald NP    orphenadrine (NORFLEX) 100 mg tablet Take 1 tablet  (100 mg total) by mouth 2 (two) times daily as needed for Muscle spasms or Pain. 20 tablet 8/5/2020 8/15/2020 Brie Mcdonald NP        Follow-up Information     Follow up With Specialties Details Why Contact Info    Jose Angel Ca MD Family Medicine, Sports Medicine Schedule an appointment as soon as possible for a visit  For follow-up [address]  [facility] [address]  [phone]      [facility] Emergency Medicine Go to  If symptoms worsen [address]  [facility] [address] [phone]                              ITHERESA, personally performed the services described in this documentation. All medical record entries made by the scribe were at my direction and in my presence. I have reviewed the chart and agree that the record reflects my personal performance and is accurate and complete.     decreased strength/pain

## 2020-08-05 NOTE — ED TRIAGE NOTES
Pt. Presents to the ED with approx 2 inch head laceration to right side after losing consciousness due to drinking too much alcohol. Pt. Reports he stood up and felt dizzy, attempted to sit down and then woke up on the ground. Reports he does not know what he hit is head on. Reports pain is 8/10, N, intermittent blurry vision. Denies V/D.

## 2020-08-08 ENCOUNTER — TELEPHONE (OUTPATIENT)
Dept: INTERNAL MEDICINE | Facility: CLINIC | Age: 35
End: 2020-08-08

## 2020-08-08 DIAGNOSIS — R74.8 ELEVATED CPK: Primary | ICD-10-CM

## 2020-08-08 NOTE — TELEPHONE ENCOUNTER
Please call patient and tell him that the muscle test called CPK came back slightly high again. I do not have an explanation, but don't think it represents a major problem.    I would like to refer to a rheumatologist to take a look and see if anything else needs to be looked at. Please see referral orders and please call patient to schedule a consult with rheumatology. Thank you.

## 2020-10-01 ENCOUNTER — CLINICAL SUPPORT (OUTPATIENT)
Dept: REHABILITATION | Facility: HOSPITAL | Age: 35
End: 2020-10-01
Attending: FAMILY MEDICINE
Payer: MEDICARE

## 2020-10-01 DIAGNOSIS — G89.29 CHRONIC PAIN OF RIGHT KNEE: ICD-10-CM

## 2020-10-01 DIAGNOSIS — M25.511 CHRONIC RIGHT SHOULDER PAIN: ICD-10-CM

## 2020-10-01 DIAGNOSIS — M25.561 CHRONIC PAIN OF RIGHT KNEE: ICD-10-CM

## 2020-10-01 DIAGNOSIS — G89.29 CHRONIC RIGHT SHOULDER PAIN: ICD-10-CM

## 2020-10-01 PROCEDURE — 97161 PT EVAL LOW COMPLEX 20 MIN: CPT

## 2020-10-01 NOTE — PLAN OF CARE
[facility]  Physical Therapy Initial Evaluation    Name: Buddy Parrish  Clinic Number: [id number]    Therapy Diagnosis:   Encounter Diagnoses   Name Primary?    Chronic right shoulder pain     Chronic pain of right knee      Physician: Farhad Lugo, *    Physician Orders: PT Eval and Treat   Medical Diagnosis from Referral: M25.511,G89.29 (ICD-10-CM) - Chronic right shoulder pain  Evaluation Date: 10/1/2020  Authorization Period Expiration: 8/4/2021  Plan of Care Expiration: 12/31/2020  Visit # / Visits authorized: 1/ 1    Time In: 1740  Time Out: 1820  Total Billable Time: 40 minutes    Precautions: Standard    Subjective   Date of onset: March 2020  History of current condition - Buddy reports: that he was walking one day in March 2020, strained R calf and small tear in L calf.  Doesn't know how he strained it.  States that he has iced calf and it feels much better.  States that he is having R knee is locking on him.  Has been going on for the past year.  Also reports that he started having B shoulder pain as well.  Was referred to therapy.  Reports L shoulder has improved but R shoulder HA, abd, and flexion are all limited.  Reports some catching during descent.  L shoulder is better but continues to have some limitations.  Reports R shoulder is worse.         Past Medical History:   Diagnosis Date    ADHD (attention deficit hyperactivity disorder)     Anxiety     Chronic back pain     Depression     Insomnia     Migraine headache     OCD (obsessive compulsive disorder)     Seasonal allergies      Buddy Parrish  has a past surgical history that includes Knee arthroscopy; Patella realignment; and Circumcision.    Buddy has a current medication list which includes the following prescription(s): mupirocin.    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    Objective     Observation: Pt is healthy and in no acute distress.    Posture: B pes planus, scapular dyskinesia      Passive Range of Motion: WNLs B but painful on descent of shoulder flexion/abd (R worse than L)     Active Range of Motion:   Shoulder Right Left   Flexion 160 175   Abduction 75 165   ER at 0 68 85   ER at 90 NT NT   IR (behind back) L3 T5     Strength:  Shoulder Right Left   Flexion 4-/5 4/5   Abduction 3-/5 4/5   ER 3+/5 4/5   IR 3+/5 4+/5       Special Tests:   Right Left   Maciel- Rob + +   Neer's + +   Full Can - -   Empty can + -   Lift off  + -     Palpation: TTP along R LH bicep, supraspinatus    Functional tests:   DL squat: decreased R LE stability   SL squat: B genu valgus   SLS EO: R 10 sec, L 30 sec    Range of Motion:   Knee Right active Left Active   Flexion WFL* WFL   Extension WFL* WFL   * painful with locking    Lower Extremity Strength: Not assessed today      CMS Impairment/Limitation/Restriction for FOTO Shoulder Survey    Therapist reviewed FOTO scores for Buddy Parrish on 10/1/2020.   FOTO documents entered into [employer] - see Media section.    Limitation Score: Take next visit%         TREATMENT   Treatment Time In: 1810  Treatment Time Out: 1820  Total Treatment time separate from Evaluation: 10 minutes    Buddy received therapeutic exercises to develop ROM, flexibility and posture for 10  "minutes including:  Supine wand flexion x 8  Standing wand ext w/ scap retraction 8 x 3"   Standing wand IR x 8        Home Exercises and Patient Education Provided    Education provided re: POC, goals for therapy, role of therapy for care, exercises/HEP    Written Home Exercises Provided: yes.  Exercises were reviewed and Buddy was able to demonstrate them prior to the end of the session.   Pt received a written copy of exercises to perform at home. Buddy demonstrated good  understanding of the education provided.     See EMR under patient instructions for exercises given.   Assessment   Buddy is a 35 y.o. male referred to outpatient Physical Therapy with a medical diagnosis of R shoulder pain.  Pt's c/c is R shoulder pain with reaching overhead and behind the back.  Pt also c/o L shoulder pain and B knee pain (R worse than L). Pt presents with decreased AROM, decreased strength, and overall decrease in R UE function.  Pt also presents with decreased R LE stability and functional mobility.  Pt will benefit from physical therapy, focusing on R shoulder ROM/strengthening, and scapular stabilization, in order to reduce his c/o pain, improve impairments and functional limitations.    Pt prognosis is Good.   Pt will benefit from skilled outpatient Physical Therapy to address the deficits stated above and in the chart below, provide pt/family education, and to maximize pt's level of independence.     Plan of care discussed with patient: Yes  Pt's spiritual, cultural and educational needs considered and patient is agreeable to the plan of care and goals as stated below:     Anticipated Barriers for therapy: none    Medical Necessity is demonstrated by the following  History  Co-morbidities and personal factors that may impact the plan of care Co-morbidities:   none    Personal Factors:   no deficits     low   Examination  Body Structures and Functions, activity limitations and participation restrictions that may impact " the plan of care Body Regions:   B UE and B LE     Body Systems:    gross symmetry  ROM  strength  gross coordinated movement  balance  gait  transfers  transitions  motor control  motor learning    Participation Restrictions:   Reaching overhead and behind the back, work, working out    Activity limitations:   Learning and applying knowledge  no deficits    General Tasks and Commands  no deficits    Communication  no deficits    Mobility  lifting and carrying objects    Self care  no deficits    Domestic Life  no deficits    Interactions/Relationships  no deficits    Life Areas  no deficits    Community and Social Life  community life  recreation and leisure         high   Clinical Presentation stable and uncomplicated low   Decision Making/ Complexity Score: low     GOALS: Short Term Goals:  6 weeks  1.Report decreased R shoulder pain < / =  4/10  to increase tolerance for reaching overhead and behind the back  2. Increase PROM to full without c/o pain.   3. Increased strength by 1/3 MMT grade in R UE to increase tolerance for ADL and work activities.  4. Pt to tolerate HEP to improve ROM and independence with ADL's    Long Term Goals: 12 weeks  1.Report decreased R shoulder pain  < / =  0-1 /10  to increase tolerance for reaching overhead and behind the back  2.Increase AROM to equivalent to L UE  3.Increase strength to >/= 4+/5 in R UE to increase tolerance for ADL and work activities.  4. Pt goal: To return to working out and working.  5. Pt will be able to perform DL squat without c/o pain or instability.    Plan   Plan of care Certification: 10/1/2020 to 12/31/2020.    Outpatient Physical Therapy 2 times weekly for 12 weeks to include the following interventions: Electrical Stimulation IFC, Gait Training, Manual Therapy, Moist Heat/ Ice, Neuromuscular Re-ed, Patient Education, Therapeutic Activites and Therapeutic Exercise.     Awilda Hays, PT, DPT, OCS

## 2020-10-22 ENCOUNTER — CLINICAL SUPPORT (OUTPATIENT)
Dept: REHABILITATION | Facility: HOSPITAL | Age: 35
End: 2020-10-22
Attending: FAMILY MEDICINE
Payer: MEDICARE

## 2020-10-22 DIAGNOSIS — M25.561 CHRONIC PAIN OF RIGHT KNEE: ICD-10-CM

## 2020-10-22 DIAGNOSIS — G89.29 CHRONIC RIGHT SHOULDER PAIN: ICD-10-CM

## 2020-10-22 DIAGNOSIS — M25.511 CHRONIC RIGHT SHOULDER PAIN: ICD-10-CM

## 2020-10-22 DIAGNOSIS — G89.29 CHRONIC PAIN OF RIGHT KNEE: ICD-10-CM

## 2020-10-22 PROCEDURE — 97112 NEUROMUSCULAR REEDUCATION: CPT

## 2020-10-22 PROCEDURE — 97110 THERAPEUTIC EXERCISES: CPT

## 2020-10-22 NOTE — PROGRESS NOTES
"  Physical Therapy Daily Treatment Note     Name: Buddy Parrish  Clinic Number: [id number]    Therapy Diagnosis:   Encounter Diagnoses   Name Primary?    Chronic right shoulder pain     Chronic pain of right knee      Physician: Farhad Lugo, *    Visit Date: 10/22/2020    Physician Orders: PT Eval and Treat   Medical Diagnosis from Referral: M25.511,G89.29 (ICD-10-CM) - Chronic right shoulder pain  Evaluation Date: 10/1/2020  Authorization Period Expiration: 12/31/2020  Plan of Care Expiration: 12/31/2020  Visit # / Visits authorized: 1/ 20     Time In: 1600  Time Out: 1645  Total Billable Time: 45 minutes     Precautions: Standard    Subjective     Pt reports: that he continues to have shoulder pain (R worse than L).  Wants to focus on shoulder today.  He was compliant with home exercise program.  Response to previous treatment: no adverse effects  Functional change: n/a    Pain: 4/10  Location: bilateral shoulder     Objective     Buddy received therapeutic exercises to develop strength, endurance and posture for 35 minutes including:  UBE 4'/4' fwd/ bkwd  Supine GTB pull downs 2 x 15 B  OTB ER walk outs 3 x 10 B  GTB rows 30 x 3"         Buddy participated in neuromuscular re-education activities to improve: motor control for 10 minutes. The following activities were included:  Prone shoulder ext w/ ER 2 x 10 R only  Qped serratus 30 x 3"         Home Exercises Provided and Patient Education Provided     Education provided:   - proper technique for exercises    Written Home Exercises Provided: Patient instructed to cont prior HEP.  Exercises were reviewed and Buddy was able to demonstrate them prior to the end of the session.  Buddy demonstrated good  understanding of the education provided.     See EMR under Patient Instructions for exercises provided at .    Assessment     Pt tolerated treatment fair today.  Increased c/o R shoulder pain with catching noted.  Improvements with weight bearing " exercise.  Provided pt with OTB and GTB for HEP.  Progress as tolerated.      Buddy is progressing well towards his goals.   Pt prognosis is Good.     Pt will continue to benefit from skilled outpatient physical therapy to address the deficits listed in the problem list box on initial evaluation, provide pt/family education and to maximize pt's level of independence in the home and community environment.     Pt's spiritual, cultural and educational needs considered and pt agreeable to plan of care and goals.    Anticipated barriers to physical therapy: none    GOALS: Short Term Goals:  6 weeks  1.Report decreased R shoulder pain < / =  4/10  to increase tolerance for reaching overhead and behind the back Progressing, not met  2. Increase PROM to full without c/o pain. Progressing, not met  3. Increased strength by 1/3 MMT grade in R UE to increase tolerance for ADL and work activities.Progressing, not met  4. Pt to tolerate HEP to improve ROM and independence with ADL's Progressing, not met    Long Term Goals: 12 weeks  1.Report decreased R shoulder pain  < / =  0-1 /10  to increase tolerance for reaching overhead and behind the back Progressing, not met  2.Increase AROM to equivalent to L UE Progressing, not met  3.Increase strength to >/= 4+/5 in R UE to increase tolerance for ADL and work activities. Progressing, not met  4. Pt goal: To return to working out and working. Progressing, not met  5. Pt will be able to perform DL squat without c/o pain or instability. Progressing, not met    Plan     Continue with established Plan of Care towards PT goals, focusing on pain free ROM, scapular/shoulder strengthening.    Awilda Hays, PT, DPT, OCS

## 2020-11-05 ENCOUNTER — DOCUMENTATION ONLY (OUTPATIENT)
Dept: REHABILITATION | Facility: HOSPITAL | Age: 35
End: 2020-11-05

## 2020-11-05 NOTE — PROGRESS NOTES
Patient call this afternoon and stated he had to pickup his grandmother and would not be able to make therapy on tx.   Rob Weaver PTA, STS

## 2020-11-08 ENCOUNTER — PATIENT OUTREACH (OUTPATIENT)
Dept: ADMINISTRATIVE | Facility: OTHER | Age: 35
End: 2020-11-08

## 2020-11-09 ENCOUNTER — OFFICE VISIT (OUTPATIENT)
Dept: RHEUMATOLOGY | Facility: CLINIC | Age: 35
End: 2020-11-09
Attending: FAMILY MEDICINE
Payer: MEDICARE

## 2020-11-09 VITALS
DIASTOLIC BLOOD PRESSURE: 74 MMHG | SYSTOLIC BLOOD PRESSURE: 118 MMHG | HEART RATE: 70 BPM | BODY MASS INDEX: 29.36 KG/M2 | TEMPERATURE: 99 F | WEIGHT: 221.56 LBS | HEIGHT: 73 IN

## 2020-11-09 DIAGNOSIS — R74.8 ELEVATED CPK: Primary | ICD-10-CM

## 2020-11-09 DIAGNOSIS — G89.29 CHRONIC RIGHT SHOULDER PAIN: ICD-10-CM

## 2020-11-09 DIAGNOSIS — M25.511 CHRONIC RIGHT SHOULDER PAIN: ICD-10-CM

## 2020-11-09 PROCEDURE — 99999 PR PBB SHADOW E&M-EST. PATIENT-LVL III: ICD-10-PCS | Mod: PBBFAC,,, | Performed by: INTERNAL MEDICINE

## 2020-11-09 PROCEDURE — 99204 OFFICE O/P NEW MOD 45 MIN: CPT | Mod: S$PBB,,, | Performed by: INTERNAL MEDICINE

## 2020-11-09 PROCEDURE — 99213 OFFICE O/P EST LOW 20 MIN: CPT | Mod: PBBFAC | Performed by: INTERNAL MEDICINE

## 2020-11-09 PROCEDURE — 99999 PR PBB SHADOW E&M-EST. PATIENT-LVL III: CPT | Mod: PBBFAC,,, | Performed by: INTERNAL MEDICINE

## 2020-11-09 PROCEDURE — 99204 PR OFFICE/OUTPT VISIT, NEW, LEVL IV, 45-59 MIN: ICD-10-PCS | Mod: S$PBB,,, | Performed by: INTERNAL MEDICINE

## 2020-11-09 RX ORDER — IBUPROFEN 600 MG/1
TABLET ORAL
COMMUNITY

## 2020-11-09 NOTE — ASSESSMENT & PLAN NOTE
R shoulder pain since early 2020; denies antecedent trauma or overuse; may have had corticosteroids when had meningitis in 2018 so avascular necrosis is included in differential diagnosis    Will schedule MRI (ordered but not completed) to r/o AVN , look at rotator cuff, and look at muscles.

## 2020-11-09 NOTE — PROGRESS NOTES
"Subjective:       Patient ID: Buddy Parrish is a 35 y.o. male.    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  Has had back and shoulder pain  Shoulder pain started early this year with morning stiffness and pain on movement; R persisted, L got better   L knee swelled earlier this year without antecedent injury; could not walk; went down after crutches; still cannot bend down on it  Can elevate R shoulder but feels pain when lowers it  Ibuprofen helped some  Tramadol helped when pain was severe    No other joint swelling  CK was 267, 301, and 264 in July and August    Still takes ibuprofen for HA    Rash side of R neck and ant chest and arm and elbow pain over summer; not persistent    PMH:  Aseptic meningitis Nov 2018    Review of Systems   Constitutional: Negative for fatigue, fever and unexpected weight change.   HENT: Negative for mouth sores.    Respiratory: Negative for cough and shortness of breath.    Cardiovascular: Negative for chest pain.   Gastrointestinal: Negative for diarrhea.   Genitourinary: Negative for dysuria.   Skin: Negative for rash.   Neurological: Positive for headaches.   Psychiatric/Behavioral: Negative for sleep disturbance.         Objective:   /74   Pulse 70   Temp 98.6 °F (37 °C)   Ht 6' 1" (1.854 m)   Wt 100.5 kg (221 lb 9 oz)   BMI 29.23 kg/m²      Physical Exam   Vitals reviewed.  Constitutional: He is well-developed, well-nourished, and in no distress.   Pulmonary/Chest: No respiratory distress.   No increased work of breathing   Neurological:   Very well developed muscles with normal tone, normal strength, and no increase of reflexes   Skin:     No rash on face; skin clear   Psychiatric:   Normal mood and affect; clear, rational thinking; speech normal and not pressured   Musculoskeletal:      Comments: No synovitis  Pain on active ROM R shoulder but full active ROM demostrated  Full strength UE and LE " except that R shoulder limited by pain  LE normal           Lab included in HPI  Assessment:       1. Elevated CPK    2. Chronic right shoulder pain        I do not find evidence to suspect inflammatory myositis; hx and PE suggests localized muscle and joint pain and lab includes negative inflammatory markers and CPK levels that are within the normal range for a healthy AA male    I am concerned that his shoulder has not improved; he either has a more severe rotator cuff tear or has avascular necrosis of the humerus that requires MRI to diagnose    Plan:       Problem List Items Addressed This Visit        Active Problems    Chronic right shoulder pain     R shoulder pain since early 2020; denies antecedent trauma or overuse; may have had corticosteroids when had meningitis in 2018 so avascular necrosis is included in differential diagnosis    Will schedule MRI (ordered but not completed) to r/o AVN , look at rotator cuff, and look at muscles.            Other Visit Diagnoses     Elevated CPK    -  Primary

## 2020-11-09 NOTE — LETTER
November 9, 2020      Jose Angel Ca MD  [address]  [facility] [address]           [email or web address]  [address]  [facility] [address]  Phone: [phone]  Fax: [phone]          Patient: Buddy Parrish   MR Number: [id number]   YOB: 1985   Date of Visit: 11/9/2020       Dear Dr. Jose Angel Ca:    Thank you for referring Buddy Parrish to me for evaluation. Attached you will find relevant portions of my assessment and plan of care.    If you have questions, please do not hesitate to call me. I look forward to following Buddy Parrish along with you.    Sincerely,    Francisco J Capone MD    Enclosure  CC:  No Recipients    If you would like to receive this communication electronically, please contact [email or web address] or [phone] to request more information on [employer] access.    For providers and/or their staff who would like to refer a patient to [facility], please contact us through our one-stop-shop provider referral line, [facility], at [phone].    If you feel you have received this communication in error or would no longer like to receive these types of communications, please e-mail [email or web address]

## 2020-11-10 ENCOUNTER — CLINICAL SUPPORT (OUTPATIENT)
Dept: REHABILITATION | Facility: HOSPITAL | Age: 35
End: 2020-11-10
Attending: FAMILY MEDICINE
Payer: MEDICARE

## 2020-11-10 DIAGNOSIS — M25.561 CHRONIC PAIN OF RIGHT KNEE: ICD-10-CM

## 2020-11-10 DIAGNOSIS — G89.29 CHRONIC PAIN OF RIGHT KNEE: ICD-10-CM

## 2020-11-10 DIAGNOSIS — G89.29 CHRONIC RIGHT SHOULDER PAIN: ICD-10-CM

## 2020-11-10 DIAGNOSIS — M25.511 CHRONIC RIGHT SHOULDER PAIN: ICD-10-CM

## 2020-11-10 PROCEDURE — 97112 NEUROMUSCULAR REEDUCATION: CPT | Mod: CQ

## 2020-11-10 PROCEDURE — 97110 THERAPEUTIC EXERCISES: CPT | Mod: CQ

## 2020-11-10 PROCEDURE — 97140 MANUAL THERAPY 1/> REGIONS: CPT | Mod: CQ

## 2020-11-11 ASSESSMENT — ROUTINE ASSESSMENT OF PATIENT INDEX DATA (RAPID3)
PAIN SCORE: 9.5
MDHAQ FUNCTION SCORE: 0.9
FATIGUE SCORE: 2
TOTAL RAPID3 SCORE: 6.67
PATIENT GLOBAL ASSESSMENT SCORE: 7.5
PSYCHOLOGICAL DISTRESS SCORE: 2.2

## 2020-11-12 ENCOUNTER — HOSPITAL ENCOUNTER (OUTPATIENT)
Dept: RADIOLOGY | Facility: HOSPITAL | Age: 35
Discharge: HOME | End: 2020-11-12
Attending: PHYSICIAN ASSISTANT
Payer: MEDICARE

## 2020-11-12 DIAGNOSIS — M25.511 CHRONIC RIGHT SHOULDER PAIN: ICD-10-CM

## 2020-11-12 DIAGNOSIS — G89.29 CHRONIC RIGHT SHOULDER PAIN: ICD-10-CM

## 2020-11-12 PROCEDURE — 73221 MRI JOINT UPR EXTREM W/O DYE: CPT | Mod: 26,RT,, | Performed by: RADIOLOGY

## 2020-11-12 PROCEDURE — 73221 MRI SHOULDER WITHOUT CONTRAST RIGHT: ICD-10-PCS | Mod: 26,RT,, | Performed by: RADIOLOGY

## 2020-11-12 PROCEDURE — 73221 MRI JOINT UPR EXTREM W/O DYE: CPT | Mod: TC,RT

## 2020-11-16 ENCOUNTER — TELEPHONE (OUTPATIENT)
Dept: ORTHOPEDICS | Facility: CLINIC | Age: 35
End: 2020-11-16

## 2020-11-16 NOTE — TELEPHONE ENCOUNTER
Spoke to patient regarding MRI results. He said he had to d/c PT due to pain. Offered him an injection. He says he really feels like he needs to have surgery for his shoulder. He wants to see a surgeon and discuss this. Will message the nurse to get him an appt with sports medicine surgeon. He voiced understanding.

## 2020-11-18 ENCOUNTER — TELEPHONE (OUTPATIENT)
Dept: SPORTS MEDICINE | Facility: CLINIC | Age: 35
End: 2020-11-18

## 2020-11-18 NOTE — TELEPHONE ENCOUNTER
Left VM for patient about rescheduling appointment with Dr. Ayon to see his PA instead for shoulder.

## 2020-11-19 ENCOUNTER — OFFICE VISIT (OUTPATIENT)
Dept: SPORTS MEDICINE | Facility: CLINIC | Age: 35
End: 2020-11-19
Payer: MEDICARE

## 2020-11-19 VITALS
DIASTOLIC BLOOD PRESSURE: 77 MMHG | WEIGHT: 214 LBS | HEART RATE: 67 BPM | BODY MASS INDEX: 28.36 KG/M2 | SYSTOLIC BLOOD PRESSURE: 132 MMHG | HEIGHT: 73 IN

## 2020-11-19 DIAGNOSIS — M25.511 CHRONIC RIGHT SHOULDER PAIN: ICD-10-CM

## 2020-11-19 DIAGNOSIS — G89.29 CHRONIC RIGHT SHOULDER PAIN: ICD-10-CM

## 2020-11-19 DIAGNOSIS — M75.21 BICEPS TENDINITIS OF RIGHT UPPER EXTREMITY: Primary | ICD-10-CM

## 2020-11-19 PROCEDURE — 99999 PR PBB SHADOW E&M-EST. PATIENT-LVL III: CPT | Mod: PBBFAC,,, | Performed by: PHYSICIAN ASSISTANT

## 2020-11-19 PROCEDURE — 99213 OFFICE O/P EST LOW 20 MIN: CPT | Mod: PBBFAC | Performed by: PHYSICIAN ASSISTANT

## 2020-11-19 PROCEDURE — 99213 PR OFFICE/OUTPT VISIT, EST, LEVL III, 20-29 MIN: ICD-10-PCS | Mod: S$PBB,,, | Performed by: PHYSICIAN ASSISTANT

## 2020-11-19 PROCEDURE — 99999 PR PBB SHADOW E&M-EST. PATIENT-LVL III: ICD-10-PCS | Mod: PBBFAC,,, | Performed by: PHYSICIAN ASSISTANT

## 2020-11-19 PROCEDURE — 99213 OFFICE O/P EST LOW 20 MIN: CPT | Mod: S$PBB,,, | Performed by: PHYSICIAN ASSISTANT

## 2020-11-19 RX ORDER — MELOXICAM 15 MG/1
15 TABLET ORAL DAILY
Qty: 30 TABLET | Refills: 0 | Status: SHIPPED | OUTPATIENT
Start: 2020-11-19 | End: 2020-12-19

## 2020-11-19 NOTE — PROGRESS NOTES
CC: RIGHT shoulder pain   35 y.o. Male presents as a new patient to me. He previously worked as a , however due to pain and COVID he has not been able to work much. He was previously seen by Dr. Ayon on 8/4/2020 where conservative care was recommended. Right shoulder pain x 6-7 months. Atraumatic onset. Pain localizes anterior and posterior shoulder. Worse with lifting motions, also with simply resting at times. He reports that the pain and weakness are worse with overhead activity. It also bothers him at night.  Better with nothing specific. Denies neck pain or radicular symptoms. Treatment thus far has included activity modifications, rest, oral medication (NSAIDs and medrol dose pack) .  Here today to discuss diagnosis and treatment options.     Pain Score:   5    PAST MEDICAL HISTORY:   Past Medical History:   Diagnosis Date    ADHD (attention deficit hyperactivity disorder)     Anxiety     Chronic back pain     Depression     Insomnia     Migraine headache     OCD (obsessive compulsive disorder)     Seasonal allergies        PAST SURGICAL HISTORY:  Past Surgical History:   Procedure Laterality Date    CIRCUMCISION      KNEE ARTHROSCOPY      PATELLA REALIGNMENT         FAMILY HISTORY:  Family History   Problem Relation Age of Onset    Hypertension Father     Hypertension Paternal Grandfather        MEDICATIONS:    Current Outpatient Medications:     ibuprofen (ADVIL,MOTRIN) 600 MG tablet, ibuprofen 600 mg tablet, Disp: , Rfl:     mupirocin (BACTROBAN) 2 % ointment, Apply topically 3 (three) times daily., Disp: 15 g, Rfl: 0    ALLERGIES:  Review of patient's allergies indicates:   Allergen Reactions    Flexeril [cyclobenzaprine] Hives        REVIEW OF SYSTEMS:  Constitution: Negative. Negative for chills, fever and night sweats.    Hematologic/Lymphatic: Negative for bleeding problem. Does not bruise/bleed easily.   Skin: Negative for dry skin, itching and rash.   Musculoskeletal:  "Negative for falls. Positive for right shoulder pain and muscle weakness.     All other review of symptoms were reviewed and found to be noncontributory.     PHYSICAL EXAMINATION:  Vitals:  /77   Pulse 67   Ht 6' 1" (1.854 m)   Wt 97.1 kg (214 lb)   BMI 28.23 kg/m²    General: Well-developed well-nourished 35 y.o. malein no acute distress   Cardiovascular: Regular rhythm by palpation of distal pulse, normal color and temperature, no concerning varicosities on symptomatic side   Lungs: No labored breathing or wheezing appreciated   Neuro: Alert and oriented ×3   Psychiatric: well oriented to person, place and time, demonstrates normal mood and affect   Skin: No rashes, lesions or ulcers, normal temperature, turgor, and texture on uninvolved extremity    Ortho/SPM Exam  active forward elevation to 90 degrees with severe pain over biceps tendon and posterior shoulder, ER with arm at side to 55 with moderate pain over biceps tendon, IR to T10 with severe pain over biceps tendon. Passive FE to 110 limited due to severe pain over biceps tendon and posterior shoulder, ER to 60. Prominent tenderness along the proximal biceps tendon. Negative AC tenderness. 4+/5 resisted supraspinatus testing. 5/5 resisted infraspinatus testing. Negative belly press test. Stable shoulder. No midline neck tenderness. Negative Spurling's maneuver.     Patient with arc motion of 110 degrees. Limited internal rotation due to severe pain.  Patient states that he does feel like he has laxity in his shoulder with checking this motion.  Negative pain with anterior and posterior shift.      IMAGING:  Xrays including AP, Outlet and Axillary Lateral of RIGHT shoulder are ordered / images reviewed by me:   No fracture or acute change appreciated. No significant glenohumeral degenerative changes. Mild to moderate AC joint arthritis. Normal acromiohumeral distance.     MRI of RIGHT shoulder reviewed by me and discussed with patient. Study shows: "   Subacromial/subdeltoid bursitis associated with bursal sided fraying of the supra and infraspinatus tendons.     Small/tiny partial-thickness articular sided tear of the posterior supraspinatus tendon.     Grades 1-2 chondromalacia of the glenohumeral joint and minimal AC joint osteoarthritis.    ASSESSMENT:      ICD-10-CM ICD-9-CM   1. Biceps tendinitis of right upper extremity  M75.21 726.12   2. Chronic right shoulder pain  M25.511 719.41    G89.29 338.29       PLAN:       -Findings and treatment options were discussed with the patient  -Discussed symptoms with Dr. Ayon, recommended MRI follow up with him to discuss injection vs. Surgery.  Patient has tried multiple conservative treatments without any relief. He states he is not very interested in injection at this time and would rather discuss surgical intervention.  -Meloxicam 15mg PO for pain prescribed today.  DC ibuprofen  -RTC next week with Dr. Ayon for injection vs. Surgical intervention discussion with MRI review  -All questions answered      Procedures

## 2020-11-27 NOTE — PROGRESS NOTES
CC: Right shoulder F/U     35 y.o. Male who presents for MRI review of right shoulder.  I saw him back in August for complaint significant right shoulder pain.  At that time findings were most consistent and concerning for biceps labral related symptoms.  He comes into clinic today complaining more of generalized right shoulder and arm pain with radiation down to the hand.  He also describes some numbness and tingling into the hand as well.  No specific dermatomal distribution.  He wants to discuss surgery for the shoulder.  States he cannot work due the shoulder.  He does have some generalized neck pain.  No recent treatment for that.  Past medical history is notable for anxiety and obsessive-compulsive disorder.    MRI of the right shoulder was ordered back in July but not completed until recently.  Pain Score: 0-No pain  PAST MEDICAL HISTORY:   Past Medical History:   Diagnosis Date    ADHD (attention deficit hyperactivity disorder)     Anxiety     Chronic back pain     Depression     Insomnia     Migraine headache     OCD (obsessive compulsive disorder)     Seasonal allergies      PAST SURGICAL HISTORY:  Past Surgical History:   Procedure Laterality Date    CIRCUMCISION      KNEE ARTHROSCOPY      PATELLA REALIGNMENT       FAMILY HISTORY:  Family History   Problem Relation Age of Onset    Hypertension Father     Hypertension Paternal Grandfather      MEDICATIONS:    Current Outpatient Medications:     ibuprofen (ADVIL,MOTRIN) 600 MG tablet, ibuprofen 600 mg tablet, Disp: , Rfl:     meloxicam (MOBIC) 15 MG tablet, Take 1 tablet (15 mg total) by mouth once daily. Take with food Discontinue if GI upset occures, Disp: 30 tablet, Rfl: 0    mupirocin (BACTROBAN) 2 % ointment, Apply topically 3 (three) times daily., Disp: 15 g, Rfl: 0    methylPREDNISolone (MEDROL DOSEPACK) 4 mg tablet, use as directed, Disp: 1 Package, Rfl: 0    ALLERGIES:  Review of patient's allergies indicates:   Allergen Reactions  "   Flexeril [cyclobenzaprine] Hives     REVIEW OF SYSTEMS:  Constitution: Negative. Negative for chills, fever and night sweats.    Hematologic/Lymphatic: Negative for bleeding problem. Does not bruise/bleed easily.   Skin: Negative for dry skin, itching and rash.   Musculoskeletal: Negative for falls. Positive for right shoulder pain and muscle weakness.     All other review of symptoms were reviewed and found to be noncontributory.     PHYSICAL EXAMINATION:  Vitals:  /78   Pulse 73   Ht 6' 1" (1.854 m)   Wt 97.1 kg (214 lb)   BMI 28.23 kg/m²    General: Well-developed well-nourished 35 y.o. malein no acute distress   Cardiovascular: Regular rhythm by palpation of distal pulse, normal color and temperature, no concerning varicosities on symptomatic side   Lungs: No labored breathing or wheezing appreciated   Neuro: Alert and oriented ×3   Psychiatric: well oriented to person, place and time, demonstrates normal mood and affect   Skin: No rashes, lesions or ulcers, normal temperature, turgor, and texture on uninvolved extremity    Ortho/SPM Exam   Exam of the right shoulder shows:  Active forward elevation to 90 degrees with severe pain over biceps tendon and posterior shoulder, ER with arm at side to 55 with moderate pain over biceps tendon, IR to T10 with severe pain over biceps tendon. Passive FE to 110 limited due to severe pain over biceps tendon and posterior shoulder, ER to 60. Prominent tenderness along the proximal biceps tendon. Negative AC tenderness.  Positive Neer and Maciel impingement signs.  4/5 resisted supraspinatus testing. 5/5 resisted infraspinatus testing. Negative belly press test. Stable shoulder.  Weakness to resisted wrist extension and wrist flexion.  Also weak to resisted biceps and triceps function.  No pathologic reflexes.  Negative Tinel sign over the carpal tunnel.  Negative Phalen's maneuver.  Midline neck tenderness.  Reduce cervical range of motion of the " neck.    IMAGING:  Prior Xrays including AP, Outlet and Axillary Lateral of RIGHT shoulder are ordered / images reviewed by me:               Negative.    Repeat x-rays of the right shoulder show no interval changes.     MRI of RIGHT shoulder reviewed by me and discussed with patient. Study shows:     Subacromial/subdeltoid bursitis associated with bursal sided fraying of the supra and infraspinatus tendons.     Small/tiny partial-thickness articular sided tear of the posterior supraspinatus tendon.     Grades 1-2 chondromalacia of the glenohumeral joint and minimal AC joint osteoarthritis.    ASSESSMENT:      ICD-10-CM ICD-9-CM   1. Subacromial bursitis of right shoulder joint  M75.51 726.19   2. Biceps tendinitis of right upper extremity  M75.21 726.12   3. Chronic right shoulder pain  M25.511 719.41    G89.29 338.29   4. Neck pain  M54.2 723.1   5. Numbness and tingling of right arm  R20.0 782.0    R20.2      PLAN:     -Findings and treatment options were discussed with the patient.  Exam findings most consistent with cervical radiculopathy today.  He does have some shoulder specific complaints however.  Recent MRI demonstrates some mild chondromalacia and subacromial bursitis.  To the contrary, I would not recommend surgery for the shoulder at this time.  He was fairly persistent about surgery however I do not think that is in his best interest.  Rather conservative treatment would be preferred for the shoulder.  Nothing on the MRI that warrants surgery currently.  -Prescribed Medrol dose pack  -Proceeded with Right shoulder subacromial CSI for both diagnostic and potential therapeutic benefit.  -MRI of C spine ordered.  Clinical concern for cervical radiculopathy.  -RTC to review MRI and evaluate effectiveness of CSI  -All questions answered    Large Joint Aspiration/Injection: R subacromial bursa    Date/Time: 12/1/2020 10:15 AM  Performed by: KEVIN Ayon MD  Authorized by: KEVIN Ayon MD      Consent Done?:  Yes (Verbal)  Indications:  Pain  Site marked: the procedure site was marked    Timeout: prior to procedure the correct patient, procedure, and site was verified    Prep: patient was prepped and draped in usual sterile fashion      Local anesthesia used?: Yes    Local anesthetic:  Co-phenylcaine spray (0.2% Naropin)  Anesthetic total (ml):  4      Details:  Needle Size:  22 G  Ultrasonic Guidance for needle placement?: No    Approach:  Posterior  Location:  Shoulder  Site:  R subacromial bursa  Medications:  40 mg triamcinolone acetonide 40 mg/mL  Patient tolerance:  Patient tolerated the procedure well with no immediate complications

## 2020-12-01 ENCOUNTER — OFFICE VISIT (OUTPATIENT)
Dept: SPORTS MEDICINE | Facility: CLINIC | Age: 35
End: 2020-12-01
Payer: MEDICARE

## 2020-12-01 VITALS
WEIGHT: 214 LBS | HEART RATE: 73 BPM | HEIGHT: 73 IN | DIASTOLIC BLOOD PRESSURE: 78 MMHG | BODY MASS INDEX: 28.36 KG/M2 | SYSTOLIC BLOOD PRESSURE: 139 MMHG

## 2020-12-01 DIAGNOSIS — M54.2 NECK PAIN: ICD-10-CM

## 2020-12-01 DIAGNOSIS — G89.29 CHRONIC RIGHT SHOULDER PAIN: ICD-10-CM

## 2020-12-01 DIAGNOSIS — M75.51 SUBACROMIAL BURSITIS OF RIGHT SHOULDER JOINT: Primary | ICD-10-CM

## 2020-12-01 DIAGNOSIS — M75.21 BICEPS TENDINITIS OF RIGHT UPPER EXTREMITY: ICD-10-CM

## 2020-12-01 DIAGNOSIS — R20.0 NUMBNESS AND TINGLING OF RIGHT ARM: ICD-10-CM

## 2020-12-01 DIAGNOSIS — M25.511 CHRONIC RIGHT SHOULDER PAIN: ICD-10-CM

## 2020-12-01 DIAGNOSIS — R20.2 NUMBNESS AND TINGLING OF RIGHT ARM: ICD-10-CM

## 2020-12-01 PROCEDURE — 20610 LARGE JOINT ASPIRATION/INJECTION: R SUBACROMIAL BURSA: ICD-10-PCS | Mod: S$PBB,RT,, | Performed by: ORTHOPAEDIC SURGERY

## 2020-12-01 PROCEDURE — 99213 PR OFFICE/OUTPT VISIT, EST, LEVL III, 20-29 MIN: ICD-10-PCS | Mod: 25,S$PBB,, | Performed by: ORTHOPAEDIC SURGERY

## 2020-12-01 PROCEDURE — 99999 PR PBB SHADOW E&M-EST. PATIENT-LVL III: ICD-10-PCS | Mod: PBBFAC,,, | Performed by: ORTHOPAEDIC SURGERY

## 2020-12-01 PROCEDURE — 99999 PR PBB SHADOW E&M-EST. PATIENT-LVL III: CPT | Mod: PBBFAC,,, | Performed by: ORTHOPAEDIC SURGERY

## 2020-12-01 PROCEDURE — 99213 OFFICE O/P EST LOW 20 MIN: CPT | Mod: 25,S$PBB,, | Performed by: ORTHOPAEDIC SURGERY

## 2020-12-01 PROCEDURE — 20610 DRAIN/INJ JOINT/BURSA W/O US: CPT | Mod: PBBFAC | Performed by: ORTHOPAEDIC SURGERY

## 2020-12-01 PROCEDURE — 99213 OFFICE O/P EST LOW 20 MIN: CPT | Mod: PBBFAC | Performed by: ORTHOPAEDIC SURGERY

## 2020-12-01 RX ORDER — METHYLPREDNISOLONE 4 MG/1
TABLET ORAL
Qty: 1 PACKAGE | Refills: 0 | Status: SHIPPED | OUTPATIENT
Start: 2020-12-01

## 2020-12-01 RX ADMIN — TRIAMCINOLONE ACETONIDE 40 MG: 40 INJECTION, SUSPENSION INTRA-ARTICULAR; INTRAMUSCULAR at 10:12

## 2020-12-03 ENCOUNTER — HOSPITAL ENCOUNTER (OUTPATIENT)
Dept: RADIOLOGY | Facility: HOSPITAL | Age: 35
Discharge: HOME | End: 2020-12-03
Attending: ORTHOPAEDIC SURGERY
Payer: MEDICARE

## 2020-12-03 DIAGNOSIS — G89.29 CHRONIC RIGHT SHOULDER PAIN: ICD-10-CM

## 2020-12-03 DIAGNOSIS — M25.511 CHRONIC RIGHT SHOULDER PAIN: ICD-10-CM

## 2020-12-03 PROCEDURE — 72141 MRI NECK SPINE W/O DYE: CPT | Mod: 26,,, | Performed by: RADIOLOGY

## 2020-12-03 PROCEDURE — 72141 MRI NECK SPINE W/O DYE: CPT | Mod: TC

## 2020-12-03 PROCEDURE — 72141 MRI CERVICAL SPINE WITHOUT CONTRAST: ICD-10-PCS | Mod: 26,,, | Performed by: RADIOLOGY

## 2020-12-07 ENCOUNTER — TELEPHONE (OUTPATIENT)
Dept: ORTHOPEDICS | Facility: CLINIC | Age: 35
End: 2020-12-07

## 2020-12-07 ENCOUNTER — PATIENT MESSAGE (OUTPATIENT)
Dept: ORTHOPEDICS | Facility: CLINIC | Age: 35
End: 2020-12-07

## 2020-12-07 ENCOUNTER — TELEPHONE (OUTPATIENT)
Dept: SPORTS MEDICINE | Facility: CLINIC | Age: 35
End: 2020-12-07

## 2020-12-07 DIAGNOSIS — M47.812 CERVICAL SPONDYLOSIS: Primary | ICD-10-CM

## 2020-12-07 RX ORDER — TRIAMCINOLONE ACETONIDE 40 MG/ML
40 INJECTION, SUSPENSION INTRA-ARTICULAR; INTRAMUSCULAR
Status: DISCONTINUED | OUTPATIENT
Start: 2020-12-01 | End: 2020-12-07 | Stop reason: HOSPADM

## 2020-12-07 NOTE — TELEPHONE ENCOUNTER
I called the patient an MRI results of the cervical spine were discussed.  He now does report some difficulties with balance and gait.  I have concern for cervical myelopathy, right upper extremity cervical radiculopathy.  I will get him in with our spine team for an evaluation.    Right subacromial steroid injection was of no significant relief.

## 2021-01-02 ENCOUNTER — PATIENT MESSAGE (OUTPATIENT)
Dept: INTERNAL MEDICINE | Facility: CLINIC | Age: 36
End: 2021-01-02

## 2021-01-02 DIAGNOSIS — R50.9 FEVER: ICD-10-CM

## 2021-02-09 ENCOUNTER — LAB VISIT (OUTPATIENT)
Dept: INTERNAL MEDICINE | Facility: CLINIC | Age: 36
End: 2021-02-09
Attending: FAMILY MEDICINE
Payer: MEDICARE

## 2021-02-09 DIAGNOSIS — R50.9 FEVER: ICD-10-CM

## 2021-02-09 PROCEDURE — U0003 INFECTIOUS AGENT DETECTION BY NUCLEIC ACID (DNA OR RNA); SEVERE ACUTE RESPIRATORY SYNDROME CORONAVIRUS 2 (SARS-COV-2) (CORONAVIRUS DISEASE [COVID-19]), AMPLIFIED PROBE TECHNIQUE, MAKING USE OF HIGH THROUGHPUT TECHNOLOGIES AS DESCRIBED BY CMS-2020-01-R: HCPCS

## 2021-02-09 PROCEDURE — U0005 INFEC AGEN DETEC AMPLI PROBE: HCPCS

## 2021-02-10 LAB — SARS-COV-2 RNA RESP QL NAA+PROBE: NOT DETECTED

## 2021-02-23 ENCOUNTER — PATIENT MESSAGE (OUTPATIENT)
Dept: RHEUMATOLOGY | Facility: CLINIC | Age: 36
End: 2021-02-23

## 2021-04-28 ENCOUNTER — PATIENT MESSAGE (OUTPATIENT)
Dept: RESEARCH | Facility: HOSPITAL | Age: 36
End: 2021-04-28

## 2021-06-12 ENCOUNTER — HOSPITAL ENCOUNTER (EMERGENCY)
Facility: HOSPITAL | Age: 36
Discharge: HOME | End: 2021-06-12
Attending: EMERGENCY MEDICINE
Payer: MEDICARE

## 2021-06-12 VITALS
SYSTOLIC BLOOD PRESSURE: 124 MMHG | WEIGHT: 230 LBS | HEIGHT: 73 IN | TEMPERATURE: 98 F | BODY MASS INDEX: 30.48 KG/M2 | OXYGEN SATURATION: 99 % | RESPIRATION RATE: 18 BRPM | DIASTOLIC BLOOD PRESSURE: 72 MMHG | HEART RATE: 82 BPM

## 2021-06-12 DIAGNOSIS — M54.50 ACUTE LEFT-SIDED LOW BACK PAIN WITHOUT SCIATICA: Primary | ICD-10-CM

## 2021-06-12 LAB
ALBUMIN SERPL BCP-MCNC: 4.4 G/DL (ref 3.5–5.2)
ALP SERPL-CCNC: 85 U/L (ref 55–135)
ALT SERPL W/O P-5'-P-CCNC: 23 U/L (ref 10–44)
ANION GAP SERPL CALC-SCNC: 9 MMOL/L (ref 8–16)
AST SERPL-CCNC: 27 U/L (ref 10–40)
BACTERIA #/AREA URNS HPF: ABNORMAL /HPF
BASOPHILS # BLD AUTO: 0.02 K/UL (ref 0–0.2)
BASOPHILS NFR BLD: 0.4 % (ref 0–1.9)
BILIRUB SERPL-MCNC: 1.2 MG/DL (ref 0.1–1)
BILIRUB UR QL STRIP: NEGATIVE
BUN SERPL-MCNC: 20 MG/DL (ref 6–20)
CALCIUM SERPL-MCNC: 9.3 MG/DL (ref 8.7–10.5)
CHLORIDE SERPL-SCNC: 101 MMOL/L (ref 95–110)
CLARITY UR: CLEAR
CO2 SERPL-SCNC: 29 MMOL/L (ref 23–29)
COLOR UR: YELLOW
CREAT SERPL-MCNC: 1.6 MG/DL (ref 0.5–1.4)
DIFFERENTIAL METHOD: ABNORMAL
EOSINOPHIL # BLD AUTO: 0 K/UL (ref 0–0.5)
EOSINOPHIL NFR BLD: 0.7 % (ref 0–8)
ERYTHROCYTE [DISTWIDTH] IN BLOOD BY AUTOMATED COUNT: 13.6 % (ref 11.5–14.5)
EST. GFR  (AFRICAN AMERICAN): >60 ML/MIN/1.73 M^2
EST. GFR  (NON AFRICAN AMERICAN): 55 ML/MIN/1.73 M^2
GLUCOSE SERPL-MCNC: 114 MG/DL (ref 70–110)
GLUCOSE UR QL STRIP: NEGATIVE
HCT VFR BLD AUTO: 44.1 % (ref 40–54)
HGB BLD-MCNC: 15 G/DL (ref 14–18)
HGB UR QL STRIP: NEGATIVE
HYALINE CASTS #/AREA URNS LPF: 15 /LPF
IMM GRANULOCYTES # BLD AUTO: 0.01 K/UL (ref 0–0.04)
IMM GRANULOCYTES NFR BLD AUTO: 0.2 % (ref 0–0.5)
KETONES UR QL STRIP: ABNORMAL
LEUKOCYTE ESTERASE UR QL STRIP: NEGATIVE
LIPASE SERPL-CCNC: 56 U/L (ref 4–60)
LYMPHOCYTES # BLD AUTO: 1.9 K/UL (ref 1–4.8)
LYMPHOCYTES NFR BLD: 35.6 % (ref 18–48)
MCH RBC QN AUTO: 27.2 PG (ref 27–31)
MCHC RBC AUTO-ENTMCNC: 34 G/DL (ref 32–36)
MCV RBC AUTO: 80 FL (ref 82–98)
MICROSCOPIC COMMENT: ABNORMAL
MONOCYTES # BLD AUTO: 0.6 K/UL (ref 0.3–1)
MONOCYTES NFR BLD: 10.4 % (ref 4–15)
NEUTROPHILS # BLD AUTO: 2.9 K/UL (ref 1.8–7.7)
NEUTROPHILS NFR BLD: 52.7 % (ref 38–73)
NITRITE UR QL STRIP: NEGATIVE
NRBC BLD-RTO: 0 /100 WBC
PH UR STRIP: 7 [PH] (ref 5–8)
PLATELET # BLD AUTO: 205 K/UL (ref 150–450)
PMV BLD AUTO: 11.2 FL (ref 9.2–12.9)
POTASSIUM SERPL-SCNC: 4.6 MMOL/L (ref 3.5–5.1)
PROT SERPL-MCNC: 8.1 G/DL (ref 6–8.4)
PROT UR QL STRIP: ABNORMAL
RBC # BLD AUTO: 5.51 M/UL (ref 4.6–6.2)
RBC #/AREA URNS HPF: 4 /HPF (ref 0–4)
SODIUM SERPL-SCNC: 139 MMOL/L (ref 136–145)
SP GR UR STRIP: >1.03 (ref 1–1.03)
URN SPEC COLLECT METH UR: ABNORMAL
UROBILINOGEN UR STRIP-ACNC: ABNORMAL EU/DL
WBC # BLD AUTO: 5.4 K/UL (ref 3.9–12.7)
WBC #/AREA URNS HPF: 3 /HPF (ref 0–5)

## 2021-06-12 PROCEDURE — 63600175 PHARM REV CODE 636 W HCPCS: Performed by: PHYSICIAN ASSISTANT

## 2021-06-12 PROCEDURE — 96361 HYDRATE IV INFUSION ADD-ON: CPT

## 2021-06-12 PROCEDURE — 99284 EMERGENCY DEPT VISIT MOD MDM: CPT | Mod: 25

## 2021-06-12 PROCEDURE — 96374 THER/PROPH/DIAG INJ IV PUSH: CPT

## 2021-06-12 PROCEDURE — 85025 COMPLETE CBC W/AUTO DIFF WBC: CPT | Performed by: PHYSICIAN ASSISTANT

## 2021-06-12 PROCEDURE — 25000003 PHARM REV CODE 250: Performed by: PHYSICIAN ASSISTANT

## 2021-06-12 PROCEDURE — 81000 URINALYSIS NONAUTO W/SCOPE: CPT | Performed by: PHYSICIAN ASSISTANT

## 2021-06-12 PROCEDURE — 80053 COMPREHEN METABOLIC PANEL: CPT | Performed by: PHYSICIAN ASSISTANT

## 2021-06-12 PROCEDURE — 83690 ASSAY OF LIPASE: CPT | Performed by: PHYSICIAN ASSISTANT

## 2021-06-12 RX ORDER — NAPROXEN 500 MG/1
500 TABLET ORAL 2 TIMES DAILY WITH MEALS
Qty: 10 TABLET | Refills: 0 | Status: SHIPPED | OUTPATIENT
Start: 2021-06-12

## 2021-06-12 RX ORDER — LIDOCAINE 50 MG/G
1 PATCH TOPICAL DAILY
Qty: 15 PATCH | Refills: 0 | Status: SHIPPED | OUTPATIENT
Start: 2021-06-12

## 2021-06-12 RX ORDER — KETOROLAC TROMETHAMINE 30 MG/ML
15 INJECTION, SOLUTION INTRAMUSCULAR; INTRAVENOUS
Status: COMPLETED | OUTPATIENT
Start: 2021-06-12 | End: 2021-06-12

## 2021-06-12 RX ORDER — METHOCARBAMOL 750 MG/1
1500 TABLET, FILM COATED ORAL 3 TIMES DAILY
Qty: 30 TABLET | Refills: 0 | Status: SHIPPED | OUTPATIENT
Start: 2021-06-12 | End: 2021-06-17

## 2021-06-12 RX ADMIN — KETOROLAC TROMETHAMINE 15 MG: 30 INJECTION, SOLUTION INTRAMUSCULAR; INTRAVENOUS at 07:06

## 2021-06-12 RX ADMIN — SODIUM CHLORIDE 1000 ML: 0.9 INJECTION, SOLUTION INTRAVENOUS at 07:06

## 2021-10-04 ENCOUNTER — PATIENT MESSAGE (OUTPATIENT)
Dept: ADMINISTRATIVE | Facility: HOSPITAL | Age: 36
End: 2021-10-04

## 2022-03-16 ENCOUNTER — PATIENT MESSAGE (OUTPATIENT)
Dept: ADMINISTRATIVE | Facility: HOSPITAL | Age: 37
End: 2022-03-16
Payer: MEDICARE

## 2022-06-09 PROCEDURE — 99284 EMERGENCY DEPT VISIT MOD MDM: CPT | Mod: 25

## 2022-06-09 PROCEDURE — 12001 RPR S/N/AX/GEN/TRNK 2.5CM/<: CPT

## 2022-06-10 ENCOUNTER — HOSPITAL ENCOUNTER (EMERGENCY)
Facility: HOSPITAL | Age: 37
Discharge: HOME | End: 2022-06-10
Attending: EMERGENCY MEDICINE
Payer: MEDICARE

## 2022-06-10 VITALS
HEIGHT: 74 IN | DIASTOLIC BLOOD PRESSURE: 77 MMHG | WEIGHT: 240 LBS | HEART RATE: 80 BPM | SYSTOLIC BLOOD PRESSURE: 120 MMHG | TEMPERATURE: 98 F | BODY MASS INDEX: 30.8 KG/M2 | OXYGEN SATURATION: 100 % | RESPIRATION RATE: 18 BRPM

## 2022-06-10 DIAGNOSIS — S61.210A LACERATION OF RIGHT INDEX FINGER WITHOUT FOREIGN BODY WITHOUT DAMAGE TO NAIL, INITIAL ENCOUNTER: Primary | ICD-10-CM

## 2022-06-10 PROCEDURE — 25000003 PHARM REV CODE 250: Performed by: PHYSICIAN ASSISTANT

## 2022-06-10 PROCEDURE — 12001 RPR S/N/AX/GEN/TRNK 2.5CM/<: CPT

## 2022-06-10 RX ORDER — AMOXICILLIN AND CLAVULANATE POTASSIUM 875; 125 MG/1; MG/1
1 TABLET, FILM COATED ORAL 2 TIMES DAILY
Qty: 14 TABLET | Refills: 0 | Status: SHIPPED | OUTPATIENT
Start: 2022-06-10 | End: 2022-06-17

## 2022-06-10 RX ORDER — AMOXICILLIN AND CLAVULANATE POTASSIUM 875; 125 MG/1; MG/1
1 TABLET, FILM COATED ORAL
Status: COMPLETED | OUTPATIENT
Start: 2022-06-10 | End: 2022-06-10

## 2022-06-10 RX ORDER — IBUPROFEN 600 MG/1
600 TABLET ORAL
Status: COMPLETED | OUTPATIENT
Start: 2022-06-10 | End: 2022-06-10

## 2022-06-10 RX ORDER — HYDROCODONE BITARTRATE AND ACETAMINOPHEN 5; 325 MG/1; MG/1
1 TABLET ORAL EVERY 6 HOURS PRN
Qty: 10 TABLET | Refills: 0 | Status: SHIPPED | OUTPATIENT
Start: 2022-06-10

## 2022-06-10 RX ORDER — ACETAMINOPHEN 500 MG
1000 TABLET ORAL
Status: COMPLETED | OUTPATIENT
Start: 2022-06-10 | End: 2022-06-10

## 2022-06-10 RX ORDER — LIDOCAINE HYDROCHLORIDE 10 MG/ML
10 INJECTION INFILTRATION; PERINEURAL
Status: COMPLETED | OUTPATIENT
Start: 2022-06-10 | End: 2022-06-10

## 2022-06-10 RX ORDER — ONDANSETRON 4 MG/1
4 TABLET, ORALLY DISINTEGRATING ORAL EVERY 6 HOURS PRN
Qty: 20 TABLET | Refills: 0 | Status: SHIPPED | OUTPATIENT
Start: 2022-06-10

## 2022-06-10 RX ORDER — SULFAMETHOXAZOLE AND TRIMETHOPRIM 800; 160 MG/1; MG/1
2 TABLET ORAL 2 TIMES DAILY
Qty: 28 TABLET | Refills: 0 | Status: SHIPPED | OUTPATIENT
Start: 2022-06-10 | End: 2022-06-17

## 2022-06-10 RX ORDER — ONDANSETRON 4 MG/1
4 TABLET, ORALLY DISINTEGRATING ORAL
Status: COMPLETED | OUTPATIENT
Start: 2022-06-10 | End: 2022-06-10

## 2022-06-10 RX ORDER — SULFAMETHOXAZOLE AND TRIMETHOPRIM 800; 160 MG/1; MG/1
2 TABLET ORAL
Status: COMPLETED | OUTPATIENT
Start: 2022-06-10 | End: 2022-06-10

## 2022-06-10 RX ADMIN — BACITRACIN ZINC, NEOMYCIN, POLYMYXIN B 1 EACH: 400; 3.5; 5 OINTMENT TOPICAL at 02:06

## 2022-06-10 RX ADMIN — IBUPROFEN 600 MG: 600 TABLET ORAL at 02:06

## 2022-06-10 RX ADMIN — AMOXICILLIN AND CLAVULANATE POTASSIUM 1 TABLET: 875; 125 TABLET, FILM COATED ORAL at 02:06

## 2022-06-10 RX ADMIN — SULFAMETHOXAZOLE AND TRIMETHOPRIM 2 TABLET: 800; 160 TABLET ORAL at 03:06

## 2022-06-10 RX ADMIN — ACETAMINOPHEN 1000 MG: 500 TABLET ORAL at 02:06

## 2022-06-10 RX ADMIN — ONDANSETRON 4 MG: 4 TABLET, ORALLY DISINTEGRATING ORAL at 03:06

## 2022-06-10 RX ADMIN — LIDOCAINE HYDROCHLORIDE 10 ML: 10 INJECTION, SOLUTION INFILTRATION; PERINEURAL at 02:06
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
## 2023-01-03 ENCOUNTER — HOSPITAL ENCOUNTER (EMERGENCY)
Facility: HOSPITAL | Age: 38
Discharge: HOME | End: 2023-01-03
Attending: EMERGENCY MEDICINE
Payer: MEDICARE

## 2023-01-03 VITALS
HEART RATE: 105 BPM | RESPIRATION RATE: 20 BRPM | DIASTOLIC BLOOD PRESSURE: 68 MMHG | WEIGHT: 230 LBS | TEMPERATURE: 100 F | BODY MASS INDEX: 29.53 KG/M2 | OXYGEN SATURATION: 97 % | SYSTOLIC BLOOD PRESSURE: 135 MMHG

## 2023-01-03 DIAGNOSIS — J10.1 INFLUENZA A: Primary | ICD-10-CM

## 2023-01-03 PROBLEM — M51.26 HERNIATED LUMBAR INTERVERTEBRAL DISC: Status: ACTIVE | Noted: 2022-03-22

## 2023-01-03 LAB
BACTERIA #/AREA URNS HPF: NORMAL /HPF
BILIRUB UR QL STRIP: NEGATIVE
CLARITY UR: CLEAR
COLOR UR: YELLOW
CTP QC/QA: YES
GLUCOSE UR QL STRIP: NEGATIVE
HGB UR QL STRIP: NEGATIVE
HYALINE CASTS #/AREA URNS LPF: 0 /LPF
KETONES UR QL STRIP: ABNORMAL
LEUKOCYTE ESTERASE UR QL STRIP: NEGATIVE
MICROSCOPIC COMMENT: NORMAL
MOLECULAR STREP A: NEGATIVE
NITRITE UR QL STRIP: NEGATIVE
PH UR STRIP: 8 [PH] (ref 5–8)
POC MOLECULAR INFLUENZA A AGN: POSITIVE
POC MOLECULAR INFLUENZA B AGN: NEGATIVE
PROT UR QL STRIP: ABNORMAL
RBC #/AREA URNS HPF: 3 /HPF (ref 0–4)
SARS-COV-2 RDRP RESP QL NAA+PROBE: NEGATIVE
SP GR UR STRIP: >1.03 (ref 1–1.03)
SQUAMOUS #/AREA URNS HPF: 0 /HPF
URN SPEC COLLECT METH UR: ABNORMAL
UROBILINOGEN UR STRIP-ACNC: >=8 EU/DL
WBC #/AREA URNS HPF: 0 /HPF (ref 0–5)

## 2023-01-03 PROCEDURE — 81000 URINALYSIS NONAUTO W/SCOPE: CPT | Performed by: EMERGENCY MEDICINE

## 2023-01-03 PROCEDURE — 99284 EMERGENCY DEPT VISIT MOD MDM: CPT

## 2023-01-03 PROCEDURE — 87651 STREP A DNA AMP PROBE: CPT

## 2023-01-03 PROCEDURE — 25000003 PHARM REV CODE 250: Performed by: EMERGENCY MEDICINE

## 2023-01-03 PROCEDURE — 87591 N.GONORRHOEAE DNA AMP PROB: CPT | Performed by: PHYSICIAN ASSISTANT

## 2023-01-03 PROCEDURE — 87502 INFLUENZA DNA AMP PROBE: CPT

## 2023-01-03 PROCEDURE — 87635 SARS-COV-2 COVID-19 AMP PRB: CPT | Performed by: EMERGENCY MEDICINE

## 2023-01-03 PROCEDURE — 87491 CHLMYD TRACH DNA AMP PROBE: CPT | Performed by: PHYSICIAN ASSISTANT

## 2023-01-03 RX ORDER — PROMETHAZINE HYDROCHLORIDE AND DEXTROMETHORPHAN HYDROBROMIDE 6.25; 15 MG/5ML; MG/5ML
5 SYRUP ORAL EVERY 4 HOURS PRN
Qty: 118 ML | Refills: 0 | Status: SHIPPED | OUTPATIENT
Start: 2023-01-03 | End: 2023-01-13

## 2023-01-03 RX ORDER — OSELTAMIVIR PHOSPHATE 75 MG/1
75 CAPSULE ORAL 2 TIMES DAILY
Qty: 10 CAPSULE | Refills: 0 | Status: SHIPPED | OUTPATIENT
Start: 2023-01-03 | End: 2023-01-08

## 2023-01-03 RX ORDER — ONDANSETRON 8 MG/1
8 TABLET, ORALLY DISINTEGRATING ORAL
Status: COMPLETED | OUTPATIENT
Start: 2023-01-03 | End: 2023-01-03

## 2023-01-03 RX ORDER — PEDI MULTIVIT NO.27/FOLIC ACID 100 MCG
2 TABLET,CHEWABLE ORAL EVERY 4 HOURS PRN
Qty: 24 EACH | Refills: 0 | Status: SHIPPED | OUTPATIENT
Start: 2023-01-03 | End: 2023-01-10

## 2023-01-03 RX ORDER — ACETAMINOPHEN 500 MG
1000 TABLET ORAL
Status: COMPLETED | OUTPATIENT
Start: 2023-01-03 | End: 2023-01-03

## 2023-01-03 RX ORDER — ONDANSETRON 4 MG/1
8 TABLET, FILM COATED ORAL EVERY 6 HOURS PRN
Qty: 30 TABLET | Refills: 0 | Status: SHIPPED | OUTPATIENT
Start: 2023-01-03 | End: 2023-02-02

## 2023-01-03 RX ADMIN — ONDANSETRON 8 MG: 8 TABLET, ORALLY DISINTEGRATING ORAL at 06:01

## 2023-01-03 RX ADMIN — ACETAMINOPHEN 1000 MG: 500 TABLET ORAL at 06:01

## 2023-01-04 NOTE — ED PROVIDER NOTES
Encounter Date: 1/3/2023    SCRIBE #1 NOTE: I, Aysha Rodriguez, am scribing for, and in the presence of,  Samuel Smith PA-C. I have scribed the following portions of the note - Other sections scribed: HPI, ROS.     History     Chief Complaint   Patient presents with    URI     Pt c/o body aches, nausea, vomiting, painful urination, started yesterday.  Pt took ibuprofen about 2 hours PTA.      Buddy Parrish is a 37 y.o. male, without a pertinent PMHx, who presents to the ED with flu-like symptoms that began last night. Patient is complaining of nausea, vomiting, cough, congestion, fever, chills, body aches, and dysuria. No other exacerbating or alleviating factors. Patient denies abdominal pain or other associated symptoms. This is the extent of the patient's complaints today in the Emergency Department.       The history is provided by the patient. No  was used.   Review of patient's allergies indicates:   Allergen Reactions    Flexeril [cyclobenzaprine] Hives     Past Medical History:   Diagnosis Date    ADHD (attention deficit hyperactivity disorder)     Anxiety     Chronic back pain     Depression     Insomnia     Migraine headache     OCD (obsessive compulsive disorder)     Seasonal allergies      Past Surgical History:   Procedure Laterality Date    CIRCUMCISION      KNEE ARTHROSCOPY      PATELLA REALIGNMENT       Family History   Problem Relation Age of Onset    Hypertension Father     Hypertension Paternal Grandfather      Social History     Tobacco Use    Smoking status: Former     Types: Cigarettes    Smokeless tobacco: Never   Substance Use Topics    Alcohol use: Yes     Comment: social    Drug use: Yes     Types: Marijuana     Review of Systems   Constitutional:  Positive for chills and fever.   HENT:  Positive for congestion. Negative for sore throat and trouble swallowing.    Eyes:  Negative for photophobia.   Respiratory:  Positive for cough. Negative for shortness of breath.     Cardiovascular:  Negative for chest pain.   Gastrointestinal:  Positive for nausea and vomiting. Negative for abdominal pain, constipation and diarrhea.   Genitourinary:  Positive for dysuria. Negative for flank pain, frequency and urgency.   Musculoskeletal:  Positive for myalgias (general). Negative for back pain.   Skin:  Negative for rash.   Neurological:  Negative for headaches.   All other systems reviewed and are negative.    Physical Exam     Initial Vitals [01/03/23 1805]   BP Pulse Resp Temp SpO2   135/68 105 20 99.8 °F (37.7 °C) 97 %      MAP       --         Physical Exam    Nursing note and vitals reviewed.  Constitutional: He appears well-developed and well-nourished. He is not diaphoretic. No distress.   HENT:   Head: Atraumatic.   Right Ear: External ear normal.   Left Ear: External ear normal.   Mouth/Throat: Oropharynx is clear and moist. No oropharyngeal exudate.   Nasal congestion present   Eyes: Conjunctivae are normal.   Neck: No tracheal deviation present.   Normal range of motion.  Cardiovascular:  Normal rate and regular rhythm.           Pulmonary/Chest: Effort normal. No accessory muscle usage or stridor. No tachypnea. No respiratory distress.   Musculoskeletal:      Cervical back: Normal range of motion.     Neurological: He displays no tremor. He displays no seizure activity. Coordination and gait normal.   Skin: Skin is warm and intact. No cyanosis.       ED Course   Procedures  Labs Reviewed   URINALYSIS, REFLEX TO URINE CULTURE - Abnormal; Notable for the following components:       Result Value    Specific Gravity, UA >1.030 (*)     Protein, UA 2+ (*)     Ketones, UA Trace (*)     Urobilinogen, UA >=8.0 (*)     All other components within normal limits    Narrative:     Specimen Source->Urine   POCT INFLUENZA A/B MOLECULAR - Abnormal; Notable for the following components:    POC Molecular Influenza A Ag Positive (*)     All other components within normal limits   C. TRACHOMATIS/N.  GONORRHOEAE BY AMP DNA   URINALYSIS MICROSCOPIC    Narrative:     Specimen Source->Urine   POCT STREP A MOLECULAR   SARS-COV-2 RDRP GENE          Imaging Results    None          Medications   acetaminophen tablet 1,000 mg (1,000 mg Oral Given 1/3/23 1841)   ondansetron disintegrating tablet 8 mg (8 mg Oral Given 1/3/23 1841)     Medical Decision Making:   History:   Old Medical Records: I decided to obtain old medical records.  Clinical Tests:   Lab Tests: Ordered and Reviewed  ED Management:  This is an emergent evaluation of a 8 y.o. male presenting to the ED for URI symptoms. Denies abdominal pain and emesis. Patient is non-toxic appearing and in no acute distress. Spectrum of symptoms most consistent with viral process. Influenza A positive. COVID19 negative. Low suspicion for bacterial PNA. No respiratory distress or hypoxia. No alternative source for symptoms on physical exam.     Tolerating PO. Remains well appearing. Discharged home with supportive care. Requesting Tamiflu. I discussed the use of OTC medications for symptom control. I advised patient to maintain adequate hydration and advance diet as tolerated to maintain adequate nutrition.    I discussed the diagnosis, treatment plan, indications for return to the emergency department, and for expected follow-up. The patient/family/caretaker verbalized an understanding. The patient/family/caretaker are asked if there are any questions or concerns. We discuss the case, until all issues are addressed to the patient/family/caretaker's satisfaction. Patient/family/caretaker understands and is agreeable to the plan.          Scribe Attestation:   Scribe #1: I performed the above scribed service and the documentation accurately describes the services I performed. I attest to the accuracy of the note.                   Clinical Impression:   Final diagnoses:  [J10.1] Influenza A (Primary)       I, Samuel Smith PA-C, personally performed the services described  in this documentation. All medical record entries made by the scribe were at my direction and in my presence. I have reviewed the chart and agree that the record reflects my personal performance and is accurate and complete.    ED Disposition Condition    Discharge Stable          ED Prescriptions       Medication Sig Dispense Start Date End Date Auth. Provider    oseltamivir (TAMIFLU) 75 MG capsule Take 1 capsule (75 mg total) by mouth 2 (two) times daily. for 5 days 10 capsule 1/3/2023 1/8/2023 Samuel Smith PA-C    ondansetron (ZOFRAN) 4 MG tablet Take 2 tablets (8 mg total) by mouth every 6 (six) hours as needed for Nausea. 30 tablet 1/3/2023 2/2/2023 Samuel Smith PA-C    promethazine-dextromethorphan (PROMETHAZINE-DM) 6.25-15 mg/5 mL Syrp Take 5 mLs by mouth every 4 (four) hours as needed (cough). 118 mL 1/3/2023 1/13/2023 Samuel Smith PA-C    PE-DM-ASA/skqvwp-DP-SZ-ASA (TITA-SELTZER PLUS DAY-NIGHT) 7.8-325 mg(d)/ 6. mg(nt) TEfS Take 2 capsules by mouth every 4 (four) hours as needed. Do not exceed more than 10 capsules in 24 hours. Do not exceed recommended dose. Children under 12 years of age:  DO NOT USE. 24 each 1/3/2023 1/10/2023 Samuel Smith PA-C          Follow-up Information       Follow up With Specialties Details Why Contact Info    Jose Angel Ca MD Family Medicine, Sports Medicine Schedule an appointment as soon as possible for a visit in 1 day For re-evaluation [address] [facility] [address]  [phone]      [facility] Emergency Dept Emergency Medicine Go to  If symptoms worsen [address] [place] [address] [phone]             Samuel Smith PA-C  01/03/23 0779

## 2023-01-04 NOTE — FIRST PROVIDER EVALUATION
Medical screening examination initiated.  I have conducted a focused provider triage encounter, findings are as follows:    Brief history of present illness:  Patient presents for complaint of generalized myalgias, chills, and fever since last night. He attempted treatment with 400 mg Ibuprofen 2 hours PTA. Patient additionally endorses sore throat, fever (Tmax 100.6), nausea, vomiting, and the sensation that he needs to urinate but cannot. Patient drinking Gatorade in triage. Denies diarrhea.    Vitals:    01/03/23 1805   BP: 135/68   BP Location: Right arm   Patient Position: Sitting   Pulse: 105   Resp: 20   Temp: 99.8 °F (37.7 °C)   TempSrc: Oral   SpO2: 97%   Weight: 104.3 kg (230 lb)       Pertinent physical exam:  Alert, awake, speaking clearly    Brief workup plan:  UA, Flu, Strep, COVID ordered    Preliminary workup initiated; this workup will be continued and followed by the physician or advanced practice provider that is assigned to the patient when roomed.     I, Dr. Carol Kincaid, personally performed the services described in this documentation. This document was produced by a scribe under my direction and in my presence. All medical record entries made by the scribe were at my direction and in my presence.  I have reviewed the chart and agree that the record reflects my personal performance and is accurate and complete. Carol Kincaid, DO.     01/04/2023 7:23 AM

## 2023-01-04 NOTE — DISCHARGE INSTRUCTIONS

## 2023-01-05 LAB
C TRACH DNA SPEC QL NAA+PROBE: NOT DETECTED
N GONORRHOEA DNA SPEC QL NAA+PROBE: NOT DETECTED

## 2024-01-09 ENCOUNTER — HOSPITAL ENCOUNTER (EMERGENCY)
Facility: HOSPITAL | Age: 39
Discharge: HOME | End: 2024-01-09
Attending: EMERGENCY MEDICINE
Payer: MEDICARE

## 2024-01-09 VITALS
HEART RATE: 75 BPM | SYSTOLIC BLOOD PRESSURE: 135 MMHG | RESPIRATION RATE: 18 BRPM | HEIGHT: 73 IN | DIASTOLIC BLOOD PRESSURE: 76 MMHG | WEIGHT: 232 LBS | BODY MASS INDEX: 30.75 KG/M2 | TEMPERATURE: 98 F | OXYGEN SATURATION: 100 %

## 2024-01-09 DIAGNOSIS — J06.9 UPPER RESPIRATORY TRACT INFECTION, UNSPECIFIED TYPE: Primary | ICD-10-CM

## 2024-01-09 LAB
CTP QC/QA: YES
MOLECULAR STREP A: NEGATIVE
POC MOLECULAR INFLUENZA A AGN: NEGATIVE
POC MOLECULAR INFLUENZA B AGN: NEGATIVE
SARS-COV-2 RDRP RESP QL NAA+PROBE: NEGATIVE

## 2024-01-09 PROCEDURE — 99283 EMERGENCY DEPT VISIT LOW MDM: CPT

## 2024-01-09 PROCEDURE — 87651 STREP A DNA AMP PROBE: CPT

## 2024-01-09 PROCEDURE — 87502 INFLUENZA DNA AMP PROBE: CPT

## 2024-01-09 PROCEDURE — 87635 SARS-COV-2 COVID-19 AMP PRB: CPT | Performed by: EMERGENCY MEDICINE

## 2024-01-09 RX ORDER — AMOXICILLIN AND CLAVULANATE POTASSIUM 875; 125 MG/1; MG/1
1 TABLET, FILM COATED ORAL 2 TIMES DAILY
Qty: 14 TABLET | Refills: 0 | Status: SHIPPED | OUTPATIENT
Start: 2024-01-09

## 2024-01-09 NOTE — ED TRIAGE NOTES
Pt reports to ED for sore throat, HA, cough and reports coughing up blood for 3 weeks. Pt states OTC medications aren't working.   Pt AAOX4

## 2024-01-09 NOTE — ED PROVIDER NOTES
Encounter Date: 1/9/2024       History     Chief Complaint   Patient presents with    Sore Throat     Productive Cough, sore throat, and congestion x3 weeks with temporary relief with OTC sinus medication. Patient reports sister recently dx with strep throat.      38 yM presents to ED today for evaluation of URI. Pt reports symptoms of productive cough, congestion and sore throat. Reports his sister was sick with strep throat a few weeks ago. Has tried OTC sinus medication for symptomatic relief. Denies fever, CP, SOB, chills, abdominal pain, N/V.    The history is provided by the patient. No  was used.     Review of patient's allergies indicates:   Allergen Reactions    Flexeril [cyclobenzaprine] Hives     Past Medical History:   Diagnosis Date    ADHD (attention deficit hyperactivity disorder)     Anxiety     Chronic back pain     Depression     Insomnia     Migraine headache     OCD (obsessive compulsive disorder)     Seasonal allergies      Past Surgical History:   Procedure Laterality Date    CIRCUMCISION      KNEE ARTHROSCOPY      PATELLA REALIGNMENT       Family History   Problem Relation Age of Onset    Hypertension Father     Hypertension Paternal Grandfather      Social History     Tobacco Use    Smoking status: Former     Types: Cigarettes    Smokeless tobacco: Never   Substance Use Topics    Alcohol use: Yes     Comment: social    Drug use: Yes     Types: Marijuana     Review of Systems   Constitutional:  Negative for chills, fatigue and fever.   HENT:  Positive for congestion, sinus pressure and sore throat. Negative for sneezing.    Respiratory:  Positive for cough. Negative for shortness of breath.    Cardiovascular:  Negative for chest pain.   Gastrointestinal:  Negative for abdominal pain, nausea and vomiting.   Genitourinary:  Negative for dysuria.   Musculoskeletal:  Negative for back pain.   Skin:  Negative for rash.   Neurological:  Negative for syncope and weakness.    Hematological:  Does not bruise/bleed easily.       Physical Exam     Initial Vitals [01/09/24 1221]   BP Pulse Resp Temp SpO2   135/76 75 18 98.4 °F (36.9 °C) 100 %      MAP       --         Physical Exam    Nursing note and vitals reviewed.  Constitutional: He appears well-developed and well-nourished. He is not diaphoretic. No distress.   HENT:   Head: Normocephalic and atraumatic.   Right Ear: External ear normal.   Left Ear: External ear normal.   Nose: Right sinus exhibits maxillary sinus tenderness. Right sinus exhibits no frontal sinus tenderness. Left sinus exhibits maxillary sinus tenderness. Left sinus exhibits no frontal sinus tenderness.   Mouth/Throat: Uvula is midline and mucous membranes are normal. Posterior oropharyngeal erythema present. No oropharyngeal exudate, posterior oropharyngeal edema or tonsillar abscesses.   Eyes: Conjunctivae are normal.   Cardiovascular:  Normal rate and regular rhythm.           Pulmonary/Chest: Breath sounds normal. No respiratory distress. He has no wheezes. He has no rhonchi. He has no rales.   Abdominal: He exhibits no distension.   Musculoskeletal:         General: No edema.     Neurological: He is alert and oriented to person, place, and time. GCS score is 15. GCS eye subscore is 4. GCS verbal subscore is 5. GCS motor subscore is 6.   Skin: Skin is warm and dry.   Psychiatric: He has a normal mood and affect. His behavior is normal. Thought content normal.         ED Course   Procedures  Labs Reviewed   POCT INFLUENZA A/B MOLECULAR   SARS-COV-2 RDRP GENE   POCT STREP A MOLECULAR          Imaging Results    None          Medications - No data to display  Medical Decision Making  38 yM presents to ED today for evaluation of URI. Pt reports symptoms of productive cough, congestion and sore throat. Reports his sister was sick with strep throat a few weeks ago. Has tried OTC sinus medication for symptomatic relief. Denies fever, CP, SOB, chills, abdominal pain,  N/V. Exam above. Rapid covid, flu and strep throat swab resulted negative. We discussed as symptoms have been ongoing for 3 weeks without improvement, I recommend pt start antibiotics at this time. Prescription for Augmentin sent to pharmacy. Pt voiced understanding and is agreeable with this plan. He was advised to follow up with PCP in 1 week for reevaluation. Return precautions discussed as well.    Of note: Differential diagnosis to include but not limited to: covid, flu, sinusitis, PNA    Guadalupe Stafford PA-C    DISCLAIMER: This note was prepared with [employer] voice recognition transcription software. Garbled syntax, mangled pronouns, and other bizarre constructions may be attributed to that software system. If you have any questions regarding information in this note please contact me.         Amount and/or Complexity of Data Reviewed  Labs: ordered.    Risk  Prescription drug management.                                      Clinical Impression:  Final diagnoses:  [J06.9] Upper respiratory tract infection, unspecified type (Primary)          ED Disposition Condition    Discharge Stable          ED Prescriptions       Medication Sig Dispense Start Date End Date Auth. Provider    amoxicillin-clavulanate 875-125mg (AUGMENTIN) 875-125 mg per tablet Take 1 tablet by mouth 2 (two) times daily. 14 tablet 1/9/2024 -- Guadalupe Stafford PA-C          Follow-up Information       Follow up With Specialties Details Why Contact Info    Jose Angel Ca MD Family Medicine, Sports Medicine Schedule an appointment as soon as possible for a visit in 1 week For follow up [address]  [place] [address]  [phone]      [facility] - Emergency Dept Emergency Medicine Go to  As needed, If symptoms worsen [address] [facility] [place] [address]  [phone]             Guaadlupe Stafford PA-C  01/09/24 5558

## 2024-06-12 ENCOUNTER — ANESTHESIA EVENT (OUTPATIENT)
Dept: SURGERY | Facility: HOSPITAL | Age: 39
DRG: 398 | End: 2024-06-12
Payer: MEDICARE

## 2024-06-12 ENCOUNTER — TELEPHONE (OUTPATIENT)
Dept: HEMATOLOGY/ONCOLOGY | Facility: CLINIC | Age: 39
End: 2024-06-12
Payer: MEDICARE

## 2024-06-12 ENCOUNTER — HOSPITAL ENCOUNTER (INPATIENT)
Facility: HOSPITAL | Age: 39
LOS: 2 days | Discharge: HOME | DRG: 398 | End: 2024-06-15
Attending: STUDENT IN AN ORGANIZED HEALTH CARE EDUCATION/TRAINING PROGRAM | Admitting: HOSPITALIST
Payer: MEDICARE

## 2024-06-12 DIAGNOSIS — K35.30 ACUTE APPENDICITIS WITH LOCALIZED PERITONITIS, WITHOUT PERFORATION, ABSCESS, OR GANGRENE: Primary | ICD-10-CM

## 2024-06-12 DIAGNOSIS — R07.9 CHEST PAIN: ICD-10-CM

## 2024-06-12 DIAGNOSIS — D68.00 VON WILLEBRAND'S DISEASE: ICD-10-CM

## 2024-06-12 DIAGNOSIS — R10.31 RLQ ABDOMINAL PAIN: ICD-10-CM

## 2024-06-12 PROBLEM — K35.80 ACUTE APPENDICITIS: Status: ACTIVE | Noted: 2024-06-12

## 2024-06-12 LAB
ALBUMIN SERPL BCP-MCNC: 3.6 G/DL (ref 3.5–5.2)
ALP SERPL-CCNC: 76 U/L (ref 55–135)
ALT SERPL W/O P-5'-P-CCNC: 14 U/L (ref 10–44)
ANION GAP SERPL CALC-SCNC: 5 MMOL/L (ref 8–16)
APTT PPP: 31.8 SEC (ref 21–32)
AST SERPL-CCNC: 17 U/L (ref 10–40)
BASOPHILS # BLD AUTO: 0.02 K/UL (ref 0–0.2)
BASOPHILS NFR BLD: 0.3 % (ref 0–1.9)
BILIRUB SERPL-MCNC: 0.9 MG/DL (ref 0.1–1)
BILIRUB UR QL STRIP: NEGATIVE
BUN SERPL-MCNC: 11 MG/DL (ref 6–20)
CALCIUM SERPL-MCNC: 9.3 MG/DL (ref 8.7–10.5)
CHLORIDE SERPL-SCNC: 103 MMOL/L (ref 95–110)
CLARITY UR: CLEAR
CO2 SERPL-SCNC: 28 MMOL/L (ref 23–29)
COLOR UR: COLORLESS
CREAT SERPL-MCNC: 1.1 MG/DL (ref 0.5–1.4)
DIFFERENTIAL METHOD BLD: ABNORMAL
EOSINOPHIL # BLD AUTO: 0.1 K/UL (ref 0–0.5)
EOSINOPHIL NFR BLD: 0.7 % (ref 0–8)
ERYTHROCYTE [DISTWIDTH] IN BLOOD BY AUTOMATED COUNT: 13.5 % (ref 11.5–14.5)
EST. GFR  (NO RACE VARIABLE): >60 ML/MIN/1.73 M^2
GLUCOSE SERPL-MCNC: 103 MG/DL (ref 70–110)
GLUCOSE UR QL STRIP: NEGATIVE
HCT VFR BLD AUTO: 41.4 % (ref 40–54)
HGB BLD-MCNC: 13.9 G/DL (ref 14–18)
HGB UR QL STRIP: NEGATIVE
IMM GRANULOCYTES # BLD AUTO: 0.01 K/UL (ref 0–0.04)
IMM GRANULOCYTES NFR BLD AUTO: 0.1 % (ref 0–0.5)
INR PPP: 1.1 (ref 0.8–1.2)
KETONES UR QL STRIP: NEGATIVE
LEUKOCYTE ESTERASE UR QL STRIP: NEGATIVE
LIPASE SERPL-CCNC: 45 U/L (ref 4–60)
LYMPHOCYTES # BLD AUTO: 1.4 K/UL (ref 1–4.8)
LYMPHOCYTES NFR BLD: 18.6 % (ref 18–48)
MAGNESIUM SERPL-MCNC: 1.7 MG/DL (ref 1.6–2.6)
MCH RBC QN AUTO: 26.9 PG (ref 27–31)
MCHC RBC AUTO-ENTMCNC: 33.6 G/DL (ref 32–36)
MCV RBC AUTO: 80 FL (ref 82–98)
MONOCYTES # BLD AUTO: 0.6 K/UL (ref 0.3–1)
MONOCYTES NFR BLD: 7.7 % (ref 4–15)
NEUTROPHILS # BLD AUTO: 5.6 K/UL (ref 1.8–7.7)
NEUTROPHILS NFR BLD: 72.6 % (ref 38–73)
NITRITE UR QL STRIP: NEGATIVE
NRBC BLD-RTO: 0 /100 WBC
PH UR STRIP: 8 [PH] (ref 5–8)
PLATELET # BLD AUTO: 191 K/UL (ref 150–450)
PMV BLD AUTO: 10.6 FL (ref 9.2–12.9)
POTASSIUM SERPL-SCNC: 3.5 MMOL/L (ref 3.5–5.1)
PROT SERPL-MCNC: 6.9 G/DL (ref 6–8.4)
PROT UR QL STRIP: NEGATIVE
PROTHROMBIN TIME: 11.7 SEC (ref 9–12.5)
RBC # BLD AUTO: 5.16 M/UL (ref 4.6–6.2)
SODIUM SERPL-SCNC: 136 MMOL/L (ref 136–145)
SP GR UR STRIP: 1.02 (ref 1–1.03)
URN SPEC COLLECT METH UR: ABNORMAL
UROBILINOGEN UR STRIP-ACNC: NEGATIVE EU/DL
WBC # BLD AUTO: 7.68 K/UL (ref 3.9–12.7)

## 2024-06-12 PROCEDURE — 63600175 PHARM REV CODE 636 W HCPCS: Mod: JZ,JG

## 2024-06-12 PROCEDURE — 96374 THER/PROPH/DIAG INJ IV PUSH: CPT

## 2024-06-12 PROCEDURE — 83735 ASSAY OF MAGNESIUM: CPT | Performed by: PHYSICIAN ASSISTANT

## 2024-06-12 PROCEDURE — 25500020 PHARM REV CODE 255: Performed by: STUDENT IN AN ORGANIZED HEALTH CARE EDUCATION/TRAINING PROGRAM

## 2024-06-12 PROCEDURE — 99223 1ST HOSP IP/OBS HIGH 75: CPT | Mod: ,,, | Performed by: SURGERY

## 2024-06-12 PROCEDURE — 63600175 PHARM REV CODE 636 W HCPCS: Performed by: PHYSICIAN ASSISTANT

## 2024-06-12 PROCEDURE — 63600175 PHARM REV CODE 636 W HCPCS: Performed by: NURSE PRACTITIONER

## 2024-06-12 PROCEDURE — 85240 CLOT FACTOR VIII AHG 1 STAGE: CPT | Performed by: INTERNAL MEDICINE

## 2024-06-12 PROCEDURE — 83690 ASSAY OF LIPASE: CPT | Performed by: PHYSICIAN ASSISTANT

## 2024-06-12 PROCEDURE — 85025 COMPLETE CBC W/AUTO DIFF WBC: CPT | Performed by: PHYSICIAN ASSISTANT

## 2024-06-12 PROCEDURE — 96376 TX/PRO/DX INJ SAME DRUG ADON: CPT

## 2024-06-12 PROCEDURE — G0378 HOSPITAL OBSERVATION PER HR: HCPCS

## 2024-06-12 PROCEDURE — 25000003 PHARM REV CODE 250: Performed by: NURSE PRACTITIONER

## 2024-06-12 PROCEDURE — 96375 TX/PRO/DX INJ NEW DRUG ADDON: CPT

## 2024-06-12 PROCEDURE — 81003 URINALYSIS AUTO W/O SCOPE: CPT | Performed by: PHYSICIAN ASSISTANT

## 2024-06-12 PROCEDURE — 80053 COMPREHEN METABOLIC PANEL: CPT | Performed by: PHYSICIAN ASSISTANT

## 2024-06-12 PROCEDURE — 99284 EMERGENCY DEPT VISIT MOD MDM: CPT | Mod: ,,, | Performed by: INTERNAL MEDICINE

## 2024-06-12 PROCEDURE — 85730 THROMBOPLASTIN TIME PARTIAL: CPT | Performed by: INTERNAL MEDICINE

## 2024-06-12 PROCEDURE — 96361 HYDRATE IV INFUSION ADD-ON: CPT

## 2024-06-12 PROCEDURE — 85397 CLOTTING FUNCT ACTIVITY: CPT | Performed by: INTERNAL MEDICINE

## 2024-06-12 PROCEDURE — 85610 PROTHROMBIN TIME: CPT | Performed by: INTERNAL MEDICINE

## 2024-06-12 PROCEDURE — 99285 EMERGENCY DEPT VISIT HI MDM: CPT | Mod: 25

## 2024-06-12 RX ORDER — ACETAMINOPHEN 500 MG
1000 TABLET ORAL EVERY 8 HOURS
Status: DISCONTINUED | OUTPATIENT
Start: 2024-06-13 | End: 2024-06-15 | Stop reason: HOSPADM

## 2024-06-12 RX ORDER — HYDROMORPHONE HYDROCHLORIDE 1 MG/ML
0.5 INJECTION, SOLUTION INTRAMUSCULAR; INTRAVENOUS; SUBCUTANEOUS
Status: DISCONTINUED | OUTPATIENT
Start: 2024-06-12 | End: 2024-06-13

## 2024-06-12 RX ORDER — NALOXONE HCL 0.4 MG/ML
0.02 VIAL (ML) INJECTION
Status: DISCONTINUED | OUTPATIENT
Start: 2024-06-12 | End: 2024-06-15 | Stop reason: HOSPADM

## 2024-06-12 RX ORDER — MORPHINE SULFATE 4 MG/ML
6 INJECTION, SOLUTION INTRAMUSCULAR; INTRAVENOUS
Status: COMPLETED | OUTPATIENT
Start: 2024-06-12 | End: 2024-06-12

## 2024-06-12 RX ORDER — ONDANSETRON HYDROCHLORIDE 2 MG/ML
4 INJECTION, SOLUTION INTRAVENOUS
Status: COMPLETED | OUTPATIENT
Start: 2024-06-12 | End: 2024-06-12

## 2024-06-12 RX ORDER — POLYETHYLENE GLYCOL 3350 17 G/17G
17 POWDER, FOR SOLUTION ORAL DAILY PRN
Status: DISCONTINUED | OUTPATIENT
Start: 2024-06-12 | End: 2024-06-14

## 2024-06-12 RX ORDER — ONDANSETRON HYDROCHLORIDE 2 MG/ML
4 INJECTION, SOLUTION INTRAVENOUS EVERY 6 HOURS PRN
Status: DISCONTINUED | OUTPATIENT
Start: 2024-06-12 | End: 2024-06-15 | Stop reason: HOSPADM

## 2024-06-12 RX ORDER — OXYCODONE HYDROCHLORIDE 5 MG/1
5 TABLET ORAL EVERY 6 HOURS PRN
Status: DISCONTINUED | OUTPATIENT
Start: 2024-06-12 | End: 2024-06-13

## 2024-06-12 RX ORDER — ONDANSETRON HYDROCHLORIDE 2 MG/ML
4 INJECTION, SOLUTION INTRAVENOUS EVERY 6 HOURS PRN
Status: DISCONTINUED | OUTPATIENT
Start: 2024-06-12 | End: 2024-06-12 | Stop reason: SDUPTHER

## 2024-06-12 RX ORDER — HYDROMORPHONE HYDROCHLORIDE 1 MG/ML
1 INJECTION, SOLUTION INTRAMUSCULAR; INTRAVENOUS; SUBCUTANEOUS
Status: COMPLETED | OUTPATIENT
Start: 2024-06-12 | End: 2024-06-12

## 2024-06-12 RX ORDER — KETOROLAC TROMETHAMINE 30 MG/ML
15 INJECTION, SOLUTION INTRAMUSCULAR; INTRAVENOUS
Status: COMPLETED | OUTPATIENT
Start: 2024-06-12 | End: 2024-06-12

## 2024-06-12 RX ORDER — MORPHINE SULFATE 4 MG/ML
4 INJECTION, SOLUTION INTRAMUSCULAR; INTRAVENOUS
Status: DISCONTINUED | OUTPATIENT
Start: 2024-06-12 | End: 2024-06-12

## 2024-06-12 RX ORDER — DOCUSATE SODIUM 100 MG/1
100 CAPSULE, LIQUID FILLED ORAL DAILY
Status: DISCONTINUED | OUTPATIENT
Start: 2024-06-12 | End: 2024-06-15 | Stop reason: HOSPADM

## 2024-06-12 RX ORDER — SODIUM CHLORIDE 9 MG/ML
INJECTION, SOLUTION INTRAVENOUS CONTINUOUS
Status: DISCONTINUED | OUTPATIENT
Start: 2024-06-12 | End: 2024-06-13

## 2024-06-12 RX ORDER — SODIUM CHLORIDE 0.9 % (FLUSH) 0.9 %
10 SYRINGE (ML) INJECTION EVERY 12 HOURS PRN
Status: DISCONTINUED | OUTPATIENT
Start: 2024-06-12 | End: 2024-06-15 | Stop reason: HOSPADM

## 2024-06-12 RX ORDER — ACETAMINOPHEN 10 MG/ML
1000 INJECTION, SOLUTION INTRAVENOUS EVERY 8 HOURS
Status: COMPLETED | OUTPATIENT
Start: 2024-06-12 | End: 2024-06-13

## 2024-06-12 RX ORDER — BISACODYL 10 MG/1
10 SUPPOSITORY RECTAL DAILY PRN
Status: DISCONTINUED | OUTPATIENT
Start: 2024-06-12 | End: 2024-06-15 | Stop reason: HOSPADM

## 2024-06-12 RX ADMIN — ONDANSETRON 4 MG: 2 INJECTION INTRAMUSCULAR; INTRAVENOUS at 03:06

## 2024-06-12 RX ADMIN — ACETAMINOPHEN 1000 MG: 10 INJECTION INTRAVENOUS at 10:06

## 2024-06-12 RX ADMIN — PIPERACILLIN AND TAZOBACTAM 4.5 G: 4; .5 INJECTION, POWDER, LYOPHILIZED, FOR SOLUTION INTRAVENOUS; PARENTERAL at 04:06

## 2024-06-12 RX ADMIN — IOHEXOL 80 ML: 350 INJECTION, SOLUTION INTRAVENOUS at 03:06

## 2024-06-12 RX ADMIN — PIPERACILLIN AND TAZOBACTAM 4.5 G: 4; .5 INJECTION, POWDER, LYOPHILIZED, FOR SOLUTION INTRAVENOUS; PARENTERAL at 11:06

## 2024-06-12 RX ADMIN — ACETAMINOPHEN 1000 MG: 10 INJECTION INTRAVENOUS at 02:06

## 2024-06-12 RX ADMIN — KETOROLAC TROMETHAMINE 15 MG: 30 INJECTION, SOLUTION INTRAMUSCULAR at 03:06

## 2024-06-12 RX ADMIN — SODIUM CHLORIDE: 9 INJECTION, SOLUTION INTRAVENOUS at 07:06

## 2024-06-12 RX ADMIN — SODIUM CHLORIDE, POTASSIUM CHLORIDE, SODIUM LACTATE AND CALCIUM CHLORIDE 1000 ML: 600; 310; 30; 20 INJECTION, SOLUTION INTRAVENOUS at 03:06

## 2024-06-12 RX ADMIN — ONDANSETRON 4 MG: 2 INJECTION INTRAMUSCULAR; INTRAVENOUS at 05:06

## 2024-06-12 RX ADMIN — HYDROMORPHONE HYDROCHLORIDE 0.5 MG: 1 INJECTION, SOLUTION INTRAMUSCULAR; INTRAVENOUS; SUBCUTANEOUS at 08:06

## 2024-06-12 RX ADMIN — HYDROMORPHONE HYDROCHLORIDE 1 MG: 1 INJECTION, SOLUTION INTRAMUSCULAR; INTRAVENOUS; SUBCUTANEOUS at 06:06

## 2024-06-12 RX ADMIN — PIPERACILLIN AND TAZOBACTAM 4.5 G: 4; .5 INJECTION, POWDER, LYOPHILIZED, FOR SOLUTION INTRAVENOUS; PARENTERAL at 07:06

## 2024-06-12 RX ADMIN — POLYETHYLENE GLYCOL 3350 17 G: 17 POWDER, FOR SOLUTION ORAL at 10:06

## 2024-06-12 RX ADMIN — MORPHINE SULFATE 6 MG: 4 INJECTION INTRAVENOUS at 03:06

## 2024-06-12 RX ADMIN — DOCUSATE SODIUM 100 MG: 100 CAPSULE, LIQUID FILLED ORAL at 01:06

## 2024-06-12 RX ADMIN — ONDANSETRON 4 MG: 2 INJECTION INTRAMUSCULAR; INTRAVENOUS at 09:06

## 2024-06-12 RX ADMIN — HYDROMORPHONE HYDROCHLORIDE 0.5 MG: 1 INJECTION, SOLUTION INTRAMUSCULAR; INTRAVENOUS; SUBCUTANEOUS at 09:06

## 2024-06-12 RX ADMIN — MORPHINE SULFATE 6 MG: 4 INJECTION INTRAVENOUS at 04:06

## 2024-06-12 RX ADMIN — HYDROMORPHONE HYDROCHLORIDE 0.5 MG: 1 INJECTION, SOLUTION INTRAMUSCULAR; INTRAVENOUS; SUBCUTANEOUS at 01:06

## 2024-06-12 NOTE — ANESTHESIA PREPROCEDURE EVALUATION
06/12/2024  Buddy Parrish is a 39 y.o., male.  To undergo Procedure(s) (LRB):  APPENDECTOMY, LAPAROSCOPIC (N/A)       Past Medical History:  Past Medical History:   Diagnosis Date    ADHD (attention deficit hyperactivity disorder)     Anxiety     Chronic back pain     Depression     Insomnia     Migraine headache     OCD (obsessive compulsive disorder)     Seasonal allergies        Past Surgical History:  Past Surgical History:   Procedure Laterality Date    CIRCUMCISION      KNEE ARTHROSCOPY      PATELLA REALIGNMENT         Social History:  Social History     Socioeconomic History    Marital status: Single   Tobacco Use    Smoking status: Former     Types: Cigarettes    Smokeless tobacco: Never   Substance and Sexual Activity    Alcohol use: Yes     Comment: social    Drug use: Yes     Types: Marijuana    Sexual activity: Yes     Partners: Female     Birth control/protection: None     Social Determinants of Health     Food Insecurity: No Food Insecurity (5/18/2020)    Hunger Vital Sign     Worried About Running Out of Food in the Last Year: Never true     Ran Out of Food in the Last Year: Never true   Transportation Needs: No Transportation Needs (5/18/2020)    PRAPARE - Transportation     Lack of Transportation (Medical): No     Lack of Transportation (Non-Medical): No   Physical Activity: Insufficiently Active (5/18/2020)    Exercise Vital Sign     Days of Exercise per Week: 3 days     Minutes of Exercise per Session: 30 min   Stress: No Stress Concern Present (5/18/2020)    [employer] - Occupational Stress Questionnaire     Feeling of Stress : Not at all       Medications:  No current facility-administered medications on file prior to encounter.     Current Outpatient Medications on File Prior to Encounter   Medication Sig Dispense Refill    cyanocobalamin, vitamin B-12,  (VITAMIN B-12) 50 mcg tablet Take 50 mcg by mouth once daily.         Allergies:  Review of patient's allergies indicates:   Allergen Reactions    Flexeril [cyclobenzaprine] Hives       Active Problems:  Patient Active Problem List   Diagnosis    Laceration of left eyebrow    Chronic low back pain    Chondromalacia of left knee    Impaired range of motion of shoulder    Right shoulder pain    Posture imbalance    Effusion of right knee    Calf swelling    Chronic right shoulder pain    Chronic pain of right knee    Depressive disorder    Herniated lumbar intervertebral disc    Obsessive-compulsive disorder    Acute appendicitis    Von Willebrand disease       Diagnostic Studies:   Latest Reference Range & Units 06/12/24 03:04   WBC 3.90 - 12.70 K/uL 7.68   RBC 4.60 - 6.20 M/uL 5.16   Hemoglobin 14.0 - 18.0 g/dL 13.9 (L)   Hematocrit 40.0 - 54.0 % 41.4   MCV 82 - 98 fL 80 (L)   MCH 27.0 - 31.0 pg 26.9 (L)   MCHC 32.0 - 36.0 g/dL 33.6   RDW 11.5 - 14.5 % 13.5   Platelet Count 150 - 450 K/uL 191   MPV 9.2 - 12.9 fL 10.6   Gran % 38.0 - 73.0 % 72.6   Lymph % 18.0 - 48.0 % 18.6   Mono % 4.0 - 15.0 % 7.7   Eos % 0.0 - 8.0 % 0.7   Basophil % 0.0 - 1.9 % 0.3   Immature Granulocytes 0.0 - 0.5 % 0.1   Gran # (ANC) 1.8 - 7.7 K/uL 5.6   Lymph # 1.0 - 4.8 K/uL 1.4   Mono # 0.3 - 1.0 K/uL 0.6   Eos # 0.0 - 0.5 K/uL 0.1   Baso # 0.00 - 0.20 K/uL 0.02   Immature Grans (Abs) 0.00 - 0.04 K/uL 0.01   nRBC 0 /100 WBC 0   Differential Method  Automated      Latest Reference Range & Units 06/12/24 03:04   Sodium 136 - 145 mmol/L 136   Potassium 3.5 - 5.1 mmol/L 3.5   Chloride 95 - 110 mmol/L 103   CO2 23 - 29 mmol/L 28   Anion Gap 8 - 16 mmol/L 5 (L)   BUN 6 - 20 mg/dL 11   Creatinine 0.5 - 1.4 mg/dL 1.1   eGFR >60 mL/min/1.73 m^2 >60      Latest Reference Range & Units 06/12/24 10:54   PT 9.0 - 12.5 sec 11.7   INR 0.8 - 1.2  1.1   PTT 21.0 - 32.0 sec 31.8     24 Hour Vitals:  Temp:  [36.4 °C (97.6 °F)-37.1 °C (98.7 °F)] 36.6 °C (97.8  °F)  Pulse:  [68-87] 69  Resp:  [11-20] 11  SpO2:  [96 %-99 %] 97 %  BP: (115-149)/(59-78) 131/73   See Nursing Charting For Additional Vitals      Pre-op Assessment    I have reviewed the Patient Summary Reports.     I have reviewed the Nursing Notes.       Review of Systems  Social:  Former Smoker, Social Alcohol Use       Hematology/Oncology:                   Hematology Comments: Von Willebrand Disease - had significant post op bleed following lipoma resection 2022                    Cardiovascular:  Cardiovascular Normal                                            Pulmonary:  Pulmonary Normal                       Hepatic/GI:        Acute appendicitis          Musculoskeletal:  Arthritis          Spine Disorders:             Neurological:      Headaches                                 Endocrine:  Endocrine Normal            Psych:  Psychiatric History anxiety                 Physical Exam  General: Well nourished    Airway:  Mallampati: I   Mouth Opening: Normal  Tongue: Normal  Neck ROM: Normal ROM    Dental:  Intact    Chest/Lungs:  Clear to auscultation    Heart:  Rate: Normal  Rhythm: Regular Rhythm  Sounds: Normal      Lab Results   Component Value Date    WBC 5.18 06/13/2024    HGB 13.5 (L) 06/13/2024    HCT 41.7 06/13/2024    MCV 83 06/13/2024     06/13/2024         Chemistry        Component Value Date/Time     06/13/2024 0415    K 3.6 06/13/2024 0415     06/13/2024 0415    CO2 26 06/13/2024 0415    BUN 9 06/13/2024 0415    CREATININE 1.3 06/13/2024 0415    GLU 71 06/13/2024 0415        Component Value Date/Time    CALCIUM 9.2 06/13/2024 0415    ALKPHOS 73 06/13/2024 0415    AST 14 06/13/2024 0415    ALT 13 06/13/2024 0415    BILITOT 1.7 (H) 06/13/2024 0415    ESTGFRAFRICA >60 06/12/2021 1924    EGFRNONAA 55 (A) 06/12/2021 1924            Anesthesia Plan  Type of Anesthesia, risks & benefits discussed:    Anesthesia Type: Gen ETT  Intra-op Monitoring Plan: Standard ASA  Monitors  Induction:  IV  Informed Consent: Informed consent signed with the Patient and all parties understand the risks and agree with anesthesia plan.  All questions answered. Patient consented to blood products? Yes  ASA Score: 2  Anesthesia Plan Notes: Per hematology load with humate 40u/kg 30 minutes prior to surgery.  Then 30u/kg daily pod #1    Ready For Surgery From Anesthesia Perspective.     .

## 2024-06-12 NOTE — ASSESSMENT & PLAN NOTE
-consulted general surgery  -NPO  -IVF  -on Zosyn   -pain control   -antiemetic as needed   -bowel regimen  -supportive care

## 2024-06-12 NOTE — SUBJECTIVE & OBJECTIVE
No current facility-administered medications on file prior to encounter.     Current Outpatient Medications on File Prior to Encounter   Medication Sig    amoxicillin-clavulanate 875-125mg (AUGMENTIN) 875-125 mg per tablet Take 1 tablet by mouth 2 (two) times daily.    HYDROcodone-acetaminophen (NORCO) 5-325 mg per tablet Take 1 tablet by mouth every 6 (six) hours as needed (Severe/breakthrough pain).    ibuprofen (ADVIL,MOTRIN) 600 MG tablet ibuprofen 600 mg tablet    LIDOcaine (LIDODERM) 5 % Place 1 patch onto the skin once daily. Remove & Discard patch within 12 hours or as directed by MD    methylPREDNISolone (MEDROL DOSEPACK) 4 mg tablet use as directed    mupirocin (BACTROBAN) 2 % ointment Apply topically 3 (three) times daily.    naproxen (NAPROSYN) 500 MG tablet Take 1 tablet (500 mg total) by mouth 2 (two) times daily with meals.    ondansetron (ZOFRAN-ODT) 4 MG TbDL Take 1 tablet (4 mg total) by mouth every 6 (six) hours as needed (Nausea).       Review of patient's allergies indicates:   Allergen Reactions    Flexeril [cyclobenzaprine] Hives       Past Medical History:   Diagnosis Date    ADHD (attention deficit hyperactivity disorder)     Anxiety     Chronic back pain     Depression     Insomnia     Migraine headache     OCD (obsessive compulsive disorder)     Seasonal allergies      Past Surgical History:   Procedure Laterality Date    CIRCUMCISION      KNEE ARTHROSCOPY      PATELLA REALIGNMENT       Family History       Problem Relation (Age of Onset)    Hypertension Father, Paternal Grandfather          Tobacco Use    Smoking status: Former     Types: Cigarettes    Smokeless tobacco: Never   Substance and Sexual Activity    Alcohol use: Yes     Comment: social    Drug use: Yes     Types: Marijuana    Sexual activity: Yes     Partners: Female     Birth control/protection: None     Review of Systems   Constitutional:  Negative for activity change, appetite change, chills, fatigue and fever.   HENT:   Negative for congestion, sinus pressure, sinus pain and sore throat.    Eyes:  Negative for visual disturbance.   Respiratory:  Negative for cough, choking, chest tightness, shortness of breath and wheezing.    Cardiovascular:  Negative for chest pain and palpitations.   Gastrointestinal:  Positive for abdominal pain and nausea. Negative for abdominal distention, constipation, diarrhea and vomiting.   Genitourinary:  Negative for difficulty urinating, dysuria and urgency.   Musculoskeletal:  Negative for back pain and myalgias.   Neurological:  Negative for dizziness and weakness.     Objective:     Vital Signs (Most Recent):  Temp: 97.8 °F (36.6 °C) (06/12/24 0759)  Pulse: 76 (06/12/24 0759)  Resp: 18 (06/12/24 0759)  BP: 119/63 (06/12/24 0759)  SpO2: 98 % (06/12/24 0759) Vital Signs (24h Range):  Temp:  [97.8 °F (36.6 °C)-98.7 °F (37.1 °C)] 97.8 °F (36.6 °C)  Pulse:  [76-87] 76  Resp:  [18-20] 18  SpO2:  [98 %-99 %] 98 %  BP: (119-149)/(63-78) 119/63     Weight: 68 kg (150 lb)  Body mass index is 19.79 kg/m².     Physical Exam  Constitutional:       General: He is not in acute distress.  HENT:      Head: Normocephalic and atraumatic.   Eyes:      Extraocular Movements: Extraocular movements intact.      Conjunctiva/sclera: Conjunctivae normal.      Pupils: Pupils are equal, round, and reactive to light.   Cardiovascular:      Rate and Rhythm: Normal rate and regular rhythm.   Pulmonary:      Effort: Pulmonary effort is normal. No respiratory distress.   Abdominal:      Palpations: Abdomen is soft.      Comments: RLQ tenderness with rebound and guarding    Neurological:      General: No focal deficit present.      Mental Status: He is alert.            I have reviewed all pertinent lab results within the past 24 hours.  CBC:   Recent Labs   Lab 06/12/24  0304   WBC 7.68   RBC 5.16   HGB 13.9*   HCT 41.4      MCV 80*   MCH 26.9*   MCHC 33.6     CMP:   Recent Labs   Lab 06/12/24  0304      CALCIUM 9.3    ALBUMIN 3.6   PROT 6.9      K 3.5   CO2 28      BUN 11   CREATININE 1.1   ALKPHOS 76   ALT 14   AST 17   BILITOT 0.9       Significant Diagnostics:  I have reviewed all pertinent imaging results/findings within the past 24 hours.

## 2024-06-12 NOTE — HPI
39 y.o. male with history of vWd type 1  who General Surgery was consulted for evaluation of acute appendicitis.Pt followed at outside facilities including [facility]  and [facility] for Type 1 vWD. Upon chart review , labs at [facility] it is noted that his factor percent was decreased in the 30s. Pt denies  bleeding with surgeries He reports he underwent lipoma removal  on his right upper thigh , oozing and hematoma formation that needed evacuation 2 weeks post op after but no hemostatic treatment nor transfusion. He did not require any blood transfusions. He also has significant epistaxis for which he was followed by ENT. He reports gum bleeds with brushing but not every time.Last episode of epistaxis > 1 year ago.  He denies any family history of abnormal bleeding or malignancy. He is never had any dental extractions. Surgical intervention involving  laparoscopic appendectomy

## 2024-06-12 NOTE — ADMISSIONCARE
AdmissionCare    Guideline: Abdominal Pain, Undiagnosed, Observation    Based on the indications selected for the patient, the bed status of Observation was determined to be MET    The following indications were selected as present at the time of evaluation of the patient:      - Suspected condition requiring continued monitoring or testing beyond emergency department care (eg, ectopic pregnancy, appendicitis, bowel ischemia)    AdmissionCare documentation entered by: Tania Naik    [facility], 28th edition, Copyright © 2024 [facility], [facility] All Rights Reserved.  [id number]

## 2024-06-12 NOTE — TELEPHONE ENCOUNTER
CONSULT   CALLER: SILVER KHAN      RM: ED 39   ADMITTING DR: DR. CARLSON   DX: VON-WILLEBRAND DISEASE / ACUTE APPENDICITIS

## 2024-06-12 NOTE — SUBJECTIVE & OBJECTIVE
Oncology Treatment Plan:   [No matching plan found]    Medications:  Continuous Infusions:   sodium chloride 0.9%   Intravenous Continuous 75 mL/hr at 06/12/24 0740 New Bag at 06/12/24 0740     Scheduled Meds:   acetaminophen  1,000 mg Intravenous Q8H    Followed by    [START ON 6/13/2024] acetaminophen  1,000 mg Oral Q8H    docusate sodium  100 mg Oral Daily    piperacillin-tazobactam (Zosyn) IV (PEDS and ADULTS) (extended infusion is not appropriate)  4.5 g Intravenous Q8H     PRN Meds:  Current Facility-Administered Medications:     [START ON 6/13/2024] antihemophilic factor-vwf, 40 Units/kg, Intravenous, See admin instructions    bisacodyL, 10 mg, Rectal, Daily PRN    HYDROmorphone, 0.5 mg, Intravenous, Q3H PRN    naloxone, 0.02 mg, Intravenous, PRN    ondansetron, 4 mg, Intravenous, Q6H PRN    oxyCODONE, 5 mg, Oral, Q6H PRN    polyethylene glycol, 17 g, Oral, Daily PRN    sodium chloride 0.9%, 10 mL, Intravenous, Q12H PRN     Review of patient's allergies indicates:   Allergen Reactions    Flexeril [cyclobenzaprine] Hives        Past Medical History:   Diagnosis Date    ADHD (attention deficit hyperactivity disorder)     Anxiety     Chronic back pain     Depression     Insomnia     Migraine headache     OCD (obsessive compulsive disorder)     Seasonal allergies      Past Surgical History:   Procedure Laterality Date    CIRCUMCISION      KNEE ARTHROSCOPY      PATELLA REALIGNMENT       Family History       Problem Relation (Age of Onset)    Hypertension Father, Paternal Grandfather          Tobacco Use    Smoking status: Former     Types: Cigarettes    Smokeless tobacco: Never   Substance and Sexual Activity    Alcohol use: Yes     Comment: social    Drug use: Yes     Types: Marijuana    Sexual activity: Yes     Partners: Female     Birth control/protection: None       Review of Systems  Objective:     Vital Signs (Most Recent):  Temp: 97.8 °F (36.6 °C) (06/12/24 1645)  Pulse: 69 (06/12/24 1645)  Resp: 11  (24 164)  BP: 131/73 (24 1645)  SpO2: 97 % (24) Vital Signs (24h Range):  Temp:  [97.6 °F (36.4 °C)-98.7 °F (37.1 °C)] 97.8 °F (36.6 °C)  Pulse:  [68-87] 69  Resp:  [11-20] 11  SpO2:  [96 %-99 %] 97 %  BP: (115-149)/(59-78) 131/73     Weight: 68 kg (150 lb)  Body mass index is 19.79 kg/m².  Body surface area is 1.87 meters squared.      Intake/Output Summary (Last 24 hours) at 2024 1722  Last data filed at 2024 1152  Gross per 24 hour   Intake 1100 ml   Output 500 ml   Net 600 ml        Physical Exam     Significant Labs:   {Select Labs:41774}    Diagnostic Results:  {Select Imagin}

## 2024-06-12 NOTE — ASSESSMENT & PLAN NOTE
40 y/o with Type 1vWD admitted with acute appendicitis with surgical intervention involving lap appendectomy planned  Pt followed by Hematology at outside facility   He has not received factor replacement in past   Labs at [facility] shows nl multimers, VW act 37% , vwAg 85, FV %   Plan loading dose with factor replacement ( humate) 40 units/kg 30 min prior to procedure  Followed by maintenance dosing for a few days post op starting on POD # 1  [facility] Hematology team have been advised of admission and will coordinate close follow up as outpatient  Monitor daily F 8 levels  vWprofile testing pending    Plan discussed with Dr. Herrera  Plan also discussed with pathologist Dr. Buckley and pharmacist Khang Campa

## 2024-06-12 NOTE — ASSESSMENT & PLAN NOTE
39 y.o. male with history of von Willebrand disease with history and workup consistent with acute appendicitis. Patient with von Willebrand disease and therefore higher surgical risk for bleeding. Patient does have an appendicolith on imaging which means just treatment with antibiotics would likely be unsuccessful and patient will need surgical intervention. Will have patient evaluated by hematology for pre-operative planning and plan for laparoscopic appendectomy this admission.     - Admit to Hospital Medicine   - Hematology consulted   - NPO for now. If no surgery today, can be put on clears. Will update primary team once patient evaluated by Hematology.   - IV Zosyn  - IVF   - Rest of care per primary

## 2024-06-12 NOTE — ED PROVIDER NOTES
Encounter Date: 6/12/2024       History     Chief Complaint   Patient presents with    Abdominal Pain     C/o RLQ pain that started this am as a gradual nagging pain. Pt states pain is now sharp and 10/10. Increased pain with palpation. Pt appears in pain. Denies nausea, vomiting or bowel/urinary complaints.     40yo M presents to ED with chief complaint right lower quadrant abdominal pain.    Patient states began this morning around 7:00 a.m. with very mild periumbilical discomfort.  States throughout the day pain has become more severe, now mostly to the right lower quadrant.  This evening around 7-8 p.m. pain became more severe.  Pain has been constant throughout the day.  Admits to associated nausea without emesis.  Decreased appetite and intake since onset of symptoms.  Denies any urinary complaints.  Normal BMs.  Denies history of similar symptoms.  No fever chills myalgias.  No meds taken prior to arrival.  Pain has begun to involve his right-sided flank and right low back.  Denies history nephrolithiasis.     No history of any abdominal surgeries      PMH:  Anxiety  Depression  Chronic back pain  ADHD  OCD  Hx migraines  Von Willebrand disease  Insomnia        Review of patient's allergies indicates:   Allergen Reactions    Flexeril [cyclobenzaprine] Hives     Past Medical History:   Diagnosis Date    ADHD (attention deficit hyperactivity disorder)     Anxiety     Chronic back pain     Depression     Insomnia     Migraine headache     OCD (obsessive compulsive disorder)     Seasonal allergies      Past Surgical History:   Procedure Laterality Date    CIRCUMCISION      KNEE ARTHROSCOPY      PATELLA REALIGNMENT       Family History   Problem Relation Name Age of Onset    Hypertension Father      Hypertension Paternal Grandfather       Social History     Tobacco Use    Smoking status: Former     Types: Cigarettes    Smokeless tobacco: Never   Substance Use Topics    Alcohol use: Yes     Comment: social    Drug  use: Yes     Types: Marijuana     Review of Systems   Constitutional:  Positive for appetite change. Negative for chills and fever.   Respiratory:  Negative for cough.    Gastrointestinal:  Positive for abdominal pain and nausea. Negative for constipation, diarrhea and vomiting.   Genitourinary:  Positive for flank pain. Negative for dysuria, frequency and hematuria.   Musculoskeletal:  Positive for back pain. Negative for myalgias, neck pain and neck stiffness.   Neurological:  Negative for syncope.       Physical Exam     Initial Vitals [06/12/24 0214]   BP Pulse Resp Temp SpO2   (!) 149/75 87 20 98.7 °F (37.1 °C) 99 %      MAP       --         Physical Exam    Nursing note and vitals reviewed.  Constitutional: He appears well-developed and well-nourished. He is not diaphoretic. No distress.   Uncomfortable appearing, nontoxic   HENT:   Head: Normocephalic and atraumatic.   Neck: Neck supple.   Normal range of motion.  Cardiovascular:  Intact distal pulses.           Abdominal: Abdomen is soft. Bowel sounds are normal. He exhibits no distension.   Periumbilical, suprapubic, right lower quadrant tenderness.  Rebound, guarding to right lower quadrant.  Right flank tenderness.   Musculoskeletal:         General: Normal range of motion.      Cervical back: Normal range of motion and neck supple.     Neurological: He is alert and oriented to person, place, and time.   Skin: Skin is warm.   Psychiatric: He has a normal mood and affect. Thought content normal.         ED Course   Procedures  Labs Reviewed   CBC W/ AUTO DIFFERENTIAL - Abnormal; Notable for the following components:       Result Value    Hemoglobin 13.9 (*)     MCV 80 (*)     MCH 26.9 (*)     All other components within normal limits   COMPREHENSIVE METABOLIC PANEL - Abnormal; Notable for the following components:    Anion Gap 5 (*)     All other components within normal limits   URINALYSIS, REFLEX TO URINE CULTURE - Abnormal; Notable for the following  components:    Color, UA Colorless (*)     All other components within normal limits    Narrative:     Specimen Source->Urine   LIPASE   MAGNESIUM          Imaging Results               CT Abdomen Pelvis With IV Contrast NO Oral Contrast (Final result)  Result time 06/12/24 06:10:52      Final result by Chuck Mesa MD (06/12/24 06:10:52)                   Impression:      1. Findings in keeping with uncomplicated acute appendicitis.  No evidence of perforation or drainable fluid collection/abscess formation at the present time.  2. Additional details, as above.  This report was flagged in Epic as abnormal.      Electronically signed by: Chuck Mesa  Date:    06/12/2024  Time:    06:10               Narrative:    EXAMINATION:  CT ABDOMEN PELVIS WITH IV CONTRAST    CLINICAL HISTORY:  RLQ abdominal pain (Age >= 14y);    TECHNIQUE:  Low dose axial images, sagittal and coronal reformations were obtained from the lung bases to the pubic symphysis following the IV administration of 80 mL of Omnipaque 350    COMPARISON:  None.    FINDINGS:  Lower chest: Unremarkable.    Liver: Unremarkable.    Gallbladder and bile ducts: Unremarkable. No biliary ductal dilatation.    Pancreas: Unremarkable.    Spleen: Unremarkable.    Adrenals: Unremarkable.    Kidneys: Unremarkable.    Lymph nodes: No abdominal or pelvic lymphadenopathy.    Bowel and mesentery: No evidence of bowel obstruction.  Mild colonic diverticulosis.    Constellation of findings in keeping with acute appendicitis.  Appendix is dilated to 10 mm diameter with thickened, enhancing walls.  Intraluminal appendicolith noted.  Comparison appendiceal inflammation is noted without discrete drainable fluid collection or abscess formation.  No extraluminal air to suggest perforation.    Abdominal aorta: Unremarkable.    Inferior vena cava: Unremarkable.    Free fluid or free air: None.    Pelvis: Unremarkable.    Body wall: Unremarkable.    Bones: Unremarkable.                                        Medications   lactated ringers bolus 1,000 mL (0 mLs Intravenous Stopped 6/12/24 0513)   ketorolac injection 15 mg (15 mg Intravenous Given 6/12/24 0301)   ondansetron injection 4 mg (4 mg Intravenous Given 6/12/24 0314)   morphine injection 6 mg (6 mg Intravenous Given 6/12/24 0315)   iohexoL (OMNIPAQUE 350) injection 80 mL (80 mLs Intravenous Given 6/12/24 0355)   morphine injection 6 mg (6 mg Intravenous Given 6/12/24 0431)   ondansetron injection 4 mg (4 mg Intravenous Given 6/12/24 0510)   HYDROmorphone injection 1 mg (1 mg Intravenous Given 6/12/24 0632)     Medical Decision Making  Differential diagnosis: Appendicitis, enteritis, colitis, small-bowel obstruction, intestinal bleeding    0630- Received pt from overnight provider.  is a 38 y/o male that presents to the ER with RLQ pain that started yesterday morning. Pain started in the periumbilical region and now is localized in the RLQ.  He reports associated nausea.    CT abdomen and pelvis with contrast show simple uncomplicated appendicitis. No evidence of perforation or abscess.  Labs reassuring with no leukocytosis, renal insufficiency. Transaminases normal. He is afebrile.    Surgery consulted; will place in observation and discuss case with hospital medicine given his history of Von Willebrand's disease.  Results were explained to pt and he is amenable to surgery.  NPO since 2AM.    Amount and/or Complexity of Data Reviewed  External Data Reviewed: notes.  Labs: ordered. Decision-making details documented in ED Course.  Radiology: ordered and independent interpretation performed. Decision-making details documented in ED Course.    Risk  Prescription drug management.               ED Course as of 06/12/24 0634   Wed Jun 12, 2024   0300 Last p.o. intake around 2:00 a.m. this morning (6/12) [SM]   0615 Discussed uncomplicated appendicitis with , on-call for General Surgery; agrees with need for admit. Will  attempt HM admit given von Willebrand dz.  [SM]      ED Course User Index  [SM] John Arenas, LANIE                           Clinical Impression:  Final diagnoses:  [K35.30] Acute appendicitis with localized peritonitis, without perforation, abscess, or gangrene (Primary)  [R10.31] RLQ abdominal pain          ED Disposition Condition    Observation Stable                Marina Zimmerman NP  06/12/24 0664

## 2024-06-12 NOTE — SUBJECTIVE & OBJECTIVE
Past Medical History:   Diagnosis Date    ADHD (attention deficit hyperactivity disorder)     Anxiety     Chronic back pain     Depression     Insomnia     Migraine headache     OCD (obsessive compulsive disorder)     Seasonal allergies        Past Surgical History:   Procedure Laterality Date    CIRCUMCISION      KNEE ARTHROSCOPY      PATELLA REALIGNMENT         Review of patient's allergies indicates:   Allergen Reactions    Flexeril [cyclobenzaprine] Hives       No current facility-administered medications on file prior to encounter.     Current Outpatient Medications on File Prior to Encounter   Medication Sig    cyanocobalamin, vitamin B-12, (VITAMIN B-12) 50 mcg tablet Take 50 mcg by mouth once daily.    [DISCONTINUED] amoxicillin-clavulanate 875-125mg (AUGMENTIN) 875-125 mg per tablet Take 1 tablet by mouth 2 (two) times daily.    [DISCONTINUED] HYDROcodone-acetaminophen (NORCO) 5-325 mg per tablet Take 1 tablet by mouth every 6 (six) hours as needed (Severe/breakthrough pain).    [DISCONTINUED] ibuprofen (ADVIL,MOTRIN) 600 MG tablet ibuprofen 600 mg tablet    [DISCONTINUED] LIDOcaine (LIDODERM) 5 % Place 1 patch onto the skin once daily. Remove & Discard patch within 12 hours or as directed by MD    [DISCONTINUED] methylPREDNISolone (MEDROL DOSEPACK) 4 mg tablet use as directed    [DISCONTINUED] mupirocin (BACTROBAN) 2 % ointment Apply topically 3 (three) times daily.    [DISCONTINUED] naproxen (NAPROSYN) 500 MG tablet Take 1 tablet (500 mg total) by mouth 2 (two) times daily with meals.    [DISCONTINUED] ondansetron (ZOFRAN-ODT) 4 MG TbDL Take 1 tablet (4 mg total) by mouth every 6 (six) hours as needed (Nausea).     Family History       Problem Relation (Age of Onset)    Hypertension Father, Paternal Grandfather          Tobacco Use    Smoking status: Former     Types: Cigarettes    Smokeless tobacco: Never   Substance and Sexual Activity    Alcohol use: Yes     Comment: social    Drug use: Yes     Types:  Marijuana    Sexual activity: Yes     Partners: Female     Birth control/protection: None     Review of Systems   Gastrointestinal:  Positive for abdominal pain and nausea.   All other systems reviewed and are negative.    Objective:     Vital Signs (Most Recent):  Temp: 98 °F (36.7 °C) (06/12/24 1116)  Pulse: 70 (06/12/24 1116)  Resp: 16 (06/12/24 1116)  BP: (!) 115/59 (06/12/24 1116)  SpO2: 97 % (06/12/24 1116) Vital Signs (24h Range):  Temp:  [97.8 °F (36.6 °C)-98.7 °F (37.1 °C)] 98 °F (36.7 °C)  Pulse:  [70-87] 70  Resp:  [16-20] 16  SpO2:  [97 %-99 %] 97 %  BP: (115-149)/(59-78) 115/59     Weight: 68 kg (150 lb)  Body mass index is 19.79 kg/m².     Physical Exam  Constitutional:       General: He is in acute distress.      Appearance: He is not ill-appearing.   HENT:      Head: Normocephalic and atraumatic.      Mouth/Throat:      Mouth: Mucous membranes are dry.   Eyes:      Extraocular Movements: Extraocular movements intact.   Cardiovascular:      Rate and Rhythm: Normal rate and regular rhythm.   Pulmonary:      Effort: Pulmonary effort is normal.      Breath sounds: Normal breath sounds.   Abdominal:      Palpations: Abdomen is soft.      Tenderness: There is abdominal tenderness. There is guarding and rebound.      Comments: RLQ tenderness    Musculoskeletal:         General: Normal range of motion.      Cervical back: Normal range of motion.   Skin:     General: Skin is warm and dry.   Neurological:      Mental Status: He is alert and oriented to person, place, and time. Mental status is at baseline.   Psychiatric:         Mood and Affect: Mood normal.         Behavior: Behavior normal.         Thought Content: Thought content normal.         Judgment: Judgment normal.                Significant Labs: All pertinent labs within the past 24 hours have been reviewed.    Significant Imaging: I have reviewed all pertinent imaging results/findings within the past 24 hours.

## 2024-06-12 NOTE — SUBJECTIVE & OBJECTIVE
Oncology Treatment Plan:   [No matching plan found]    Medications:  Continuous Infusions:   sodium chloride 0.9%   Intravenous Continuous 75 mL/hr at 06/12/24 0740 New Bag at 06/12/24 0740     Scheduled Meds:   acetaminophen  1,000 mg Intravenous Q8H    Followed by    [START ON 6/13/2024] acetaminophen  1,000 mg Oral Q8H    docusate sodium  100 mg Oral Daily    piperacillin-tazobactam (Zosyn) IV (PEDS and ADULTS) (extended infusion is not appropriate)  4.5 g Intravenous Q8H     PRN Meds:  Current Facility-Administered Medications:     [START ON 6/13/2024] antihemophilic factor-vwf, 40 Units/kg, Intravenous, See admin instructions    bisacodyL, 10 mg, Rectal, Daily PRN    HYDROmorphone, 0.5 mg, Intravenous, Q3H PRN    naloxone, 0.02 mg, Intravenous, PRN    ondansetron, 4 mg, Intravenous, Q6H PRN    oxyCODONE, 5 mg, Oral, Q6H PRN    polyethylene glycol, 17 g, Oral, Daily PRN    sodium chloride 0.9%, 10 mL, Intravenous, Q12H PRN     Review of patient's allergies indicates:   Allergen Reactions    Flexeril [cyclobenzaprine] Hives        Past Medical History:   Diagnosis Date    ADHD (attention deficit hyperactivity disorder)     Anxiety     Chronic back pain     Depression     Insomnia     Migraine headache     OCD (obsessive compulsive disorder)     Seasonal allergies      Past Surgical History:   Procedure Laterality Date    CIRCUMCISION      KNEE ARTHROSCOPY      PATELLA REALIGNMENT       Family History       Problem Relation (Age of Onset)    Hypertension Father, Paternal Grandfather          Tobacco Use    Smoking status: Former     Types: Cigarettes    Smokeless tobacco: Never   Substance and Sexual Activity    Alcohol use: Yes     Comment: social    Drug use: Yes     Types: Marijuana    Sexual activity: Yes     Partners: Female     Birth control/protection: None       Review of Systems   Constitutional:  Negative for activity change, appetite change, fatigue and fever.   HENT:  Negative for congestion, facial  swelling, sinus pressure and sore throat.    Eyes:  Negative for visual disturbance.   Respiratory:  Negative for cough and shortness of breath.    Cardiovascular:  Negative for chest pain and palpitations.   Gastrointestinal:  Positive for abdominal pain and nausea. Negative for abdominal distention, constipation, diarrhea and vomiting.   Genitourinary:  Negative for difficulty urinating, dysuria and urgency.   Musculoskeletal:  Negative for back pain and myalgias.   Neurological:  Negative for dizziness and weakness.     Objective:     Vital Signs (Most Recent):  Temp: 97.6 °F (36.4 °C) (06/12/24 1242)  Pulse: 68 (06/12/24 1242)  Resp: 16 (06/12/24 1302)  BP: 118/70 (06/12/24 1242)  SpO2: 98 % (06/12/24 1242) Vital Signs (24h Range):  Temp:  [97.6 °F (36.4 °C)-98.7 °F (37.1 °C)] 97.6 °F (36.4 °C)  Pulse:  [68-87] 68  Resp:  [16-20] 16  SpO2:  [97 %-99 %] 98 %  BP: (115-149)/(59-78) 118/70     Weight: 68 kg (150 lb)  Body mass index is 19.79 kg/m².  Body surface area is 1.87 meters squared.      Intake/Output Summary (Last 24 hours) at 6/12/2024 1616  Last data filed at 6/12/2024 1152  Gross per 24 hour   Intake 1100 ml   Output 500 ml   Net 600 ml        Physical Exam  Constitutional:       General: He is not in acute distress.     Appearance: He is not ill-appearing.   HENT:      Head: Normocephalic and atraumatic.      Mouth/Throat:      Mouth: Mucous membranes are dry.   Eyes:      General: No scleral icterus.     Extraocular Movements: Extraocular movements intact.   Cardiovascular:      Rate and Rhythm: Normal rate and regular rhythm.   Pulmonary:      Effort: Pulmonary effort is normal.      Breath sounds: Normal breath sounds. No wheezing.   Abdominal:      Palpations: Abdomen is soft.      Tenderness: There is abdominal tenderness. There is guarding and rebound.      Comments: RLQ tenderness    Musculoskeletal:         General: Normal range of motion.      Cervical back: Normal range of motion.   Skin:      General: Skin is warm and dry.   Neurological:      Mental Status: He is alert and oriented to person, place, and time.   Psychiatric:         Mood and Affect: Mood normal.         Behavior: Behavior normal.         Thought Content: Thought content normal.          Significant Labs:   All pertinent labs from the last 24 hours have been reviewed.    Diagnostic Results:  I have reviewed and interpreted all pertinent imaging results/findings within the past 24 hours.

## 2024-06-12 NOTE — CONSULTS
[facility] Emergency Dept  General Surgery  Consult Note    Patient Name: Buddy Parrish  MRN: [id number]  Code Status: Full Code  Admission Date: 6/12/2024  Hospital Length of Stay: 0 days  Attending Physician: Anthony Bautista III, MD  Primary Care Provider: JoseA ngel Ca MD    Patient information was obtained from patient and ER records.     Inpatient consult to General Surgery  Consult performed by: Sabina Velásquez MD  Consult ordered by: Marielena Kelley NP        Subjective:     Principal Problem: <principal problem not specified>    History of Present Illness: Patient is a 39 y.o. male with history of von Willebrand disease who General Surgery was consulted for evaluation of acute appendicitis. Patient states that he started having periumbilical discomfort yesterday morning that progressed throughout the day. Became more severe and migrated to the RLQ. Associated with nausea without emesis. AFVSS on arrival to the ED. Labs largely wnl and without leukocytosis. CT was performed which revealed findings consistent with acute appendicitis as well as an intraluminal appendicolith. Patient denies history of abdominal surgery. No history of MI/stroke. Has von Willebrand disease and significant bleeding post-op from lipoma removal in 2022. Only takes B12 supplementation.       No current facility-administered medications on file prior to encounter.     Current Outpatient Medications on File Prior to Encounter   Medication Sig    amoxicillin-clavulanate 875-125mg (AUGMENTIN) 875-125 mg per tablet Take 1 tablet by mouth 2 (two) times daily.    HYDROcodone-acetaminophen (NORCO) 5-325 mg per tablet Take 1 tablet by mouth every 6 (six) hours as needed (Severe/breakthrough pain).    ibuprofen (ADVIL,MOTRIN) 600 MG tablet ibuprofen 600 mg tablet    LIDOcaine (LIDODERM) 5 % Place 1 patch onto the skin once daily. Remove & Discard patch within 12 hours or as directed by MD    methylPREDNISolone (MEDROL DOSEPACK) 4 mg  tablet use as directed    mupirocin (BACTROBAN) 2 % ointment Apply topically 3 (three) times daily.    naproxen (NAPROSYN) 500 MG tablet Take 1 tablet (500 mg total) by mouth 2 (two) times daily with meals.    ondansetron (ZOFRAN-ODT) 4 MG TbDL Take 1 tablet (4 mg total) by mouth every 6 (six) hours as needed (Nausea).       Review of patient's allergies indicates:   Allergen Reactions    Flexeril [cyclobenzaprine] Hives       Past Medical History:   Diagnosis Date    ADHD (attention deficit hyperactivity disorder)     Anxiety     Chronic back pain     Depression     Insomnia     Migraine headache     OCD (obsessive compulsive disorder)     Seasonal allergies      Past Surgical History:   Procedure Laterality Date    CIRCUMCISION      KNEE ARTHROSCOPY      PATELLA REALIGNMENT       Family History       Problem Relation (Age of Onset)    Hypertension Father, Paternal Grandfather          Tobacco Use    Smoking status: Former     Types: Cigarettes    Smokeless tobacco: Never   Substance and Sexual Activity    Alcohol use: Yes     Comment: social    Drug use: Yes     Types: Marijuana    Sexual activity: Yes     Partners: Female     Birth control/protection: None     Review of Systems   Constitutional:  Negative for activity change, appetite change, chills, fatigue and fever.   HENT:  Negative for congestion, sinus pressure, sinus pain and sore throat.    Eyes:  Negative for visual disturbance.   Respiratory:  Negative for cough, choking, chest tightness, shortness of breath and wheezing.    Cardiovascular:  Negative for chest pain and palpitations.   Gastrointestinal:  Positive for abdominal pain and nausea. Negative for abdominal distention, constipation, diarrhea and vomiting.   Genitourinary:  Negative for difficulty urinating, dysuria and urgency.   Musculoskeletal:  Negative for back pain and myalgias.   Neurological:  Negative for dizziness and weakness.     Objective:     Vital Signs (Most Recent):  Temp: 97.8  °F (36.6 °C) (06/12/24 0759)  Pulse: 76 (06/12/24 0759)  Resp: 18 (06/12/24 0759)  BP: 119/63 (06/12/24 0759)  SpO2: 98 % (06/12/24 0759) Vital Signs (24h Range):  Temp:  [97.8 °F (36.6 °C)-98.7 °F (37.1 °C)] 97.8 °F (36.6 °C)  Pulse:  [76-87] 76  Resp:  [18-20] 18  SpO2:  [98 %-99 %] 98 %  BP: (119-149)/(63-78) 119/63     Weight: 68 kg (150 lb)  Body mass index is 19.79 kg/m².     Physical Exam  Constitutional:       General: He is not in acute distress.  HENT:      Head: Normocephalic and atraumatic.   Eyes:      Extraocular Movements: Extraocular movements intact.      Conjunctiva/sclera: Conjunctivae normal.      Pupils: Pupils are equal, round, and reactive to light.   Cardiovascular:      Rate and Rhythm: Normal rate and regular rhythm.   Pulmonary:      Effort: Pulmonary effort is normal. No respiratory distress.   Abdominal:      Palpations: Abdomen is soft.      Comments: RLQ tenderness with rebound and guarding    Neurological:      General: No focal deficit present.      Mental Status: He is alert.            I have reviewed all pertinent lab results within the past 24 hours.  CBC:   Recent Labs   Lab 06/12/24  0304   WBC 7.68   RBC 5.16   HGB 13.9*   HCT 41.4      MCV 80*   MCH 26.9*   MCHC 33.6     CMP:   Recent Labs   Lab 06/12/24  0304      CALCIUM 9.3   ALBUMIN 3.6   PROT 6.9      K 3.5   CO2 28      BUN 11   CREATININE 1.1   ALKPHOS 76   ALT 14   AST 17   BILITOT 0.9       Significant Diagnostics:  I have reviewed all pertinent imaging results/findings within the past 24 hours.    Assessment/Plan:     Acute appendicitis  39 y.o. male with history of von Willebrand disease with history and workup consistent with acute appendicitis. Patient with von Willebrand disease and therefore higher surgical risk for bleeding. Patient does have an appendicolith on imaging which means just treatment with antibiotics would likely be unsuccessful and patient will need surgical  intervention. Will have patient evaluated by hematology for pre-operative planning and plan for laparoscopic appendectomy this admission.     - Admit to Hospital Medicine   - Hematology consulted   - NPO for now. If no surgery today, can be put on clears. Will update primary team once patient evaluated by Hematology.   - IV Zosyn  - IVF   - Rest of care per primary      VTE Risk Mitigation (From admission, onward)           Ordered     IP VTE LOW RISK PATIENT  Once         06/12/24 0837     Place sequential compression device  Until discontinued         06/12/24 0837                    Thank you for your consult. I will follow-up with patient. Please contact us if you have any additional questions.    Sabina Velásquez MD  General Surgery  [facility] - Emergency Dept

## 2024-06-12 NOTE — HPI
Patient is a 39 y.o. male with history of von Willebrand disease who General Surgery was consulted for evaluation of acute appendicitis. Patient states that he started having periumbilical discomfort yesterday morning that progressed throughout the day. Became more severe and migrated to the RLQ. Associated with nausea without emesis. AFVSS on arrival to the ED. Labs largely wnl and without leukocytosis. CT was performed which revealed findings consistent with acute appendicitis as well as an intraluminal appendicolith. Patient denies history of abdominal surgery. No history of MI/stroke. Has von Willebrand disease and significant bleeding post-op from lipoma removal in 2022. Only takes B12 supplementation.

## 2024-06-12 NOTE — HPI
39 year old male with history of Von Willebrand disease present with complaint of right lower quadrant abdominal pain. Starting around 7 am he began to have deepthi mild periumbilical discomfort.  States throughout the day pain has become more severe, now mostly to the right lower quadrant. this evening around 7-8 p.m. pain became more severe.  Pain has been constant throughout the day. He also complains of nausea without vomiting. Decreased appetite and intake since onset of symptoms.  Normal BMs.  Denies history of similar symptoms.  No fever chills myalgias.  No meds taken prior to arrival.  Pain has begun to involve his right-sided flank and right low back.  Denies history nephrolithiasis.         In the ED:   Labs: cbc and cmp unremarkable  CT abd/pelvis: 1. Findings in keeping with uncomplicated acute appendicitis. No evidence of perforation or drainable fluid collection/abscess formation at the present time.

## 2024-06-12 NOTE — CONSULTS
[facility] - Emergency Dept  Hematology/Oncology  Consult Note    Patient Name: Buddy Parrish  MRN: [id number]  Admission Date: 6/12/2024  Hospital Length of Stay: 0 days  Code Status: Full Code   Attending Provider: Kalyani Anderson MD  Consulting Provider: Nevaeh Lacey MD  Primary Care Physician: Jose Angel Ca MD  Principal Problem:Acute appendicitis    [facility]  Consult performed by: Nevaeh Lacey MD  Consult ordered by: Sabina Velásquez MD        Subjective:     HPI:  39 y.o. male with history of vWd type 1 admitted with acute appendicitis. .Pt followed at outside facilities including [facility]  and [facility] for Type 1 vWD. Upon chart review , labs at [facility] it is noted that his factor percent was decreased in the 30s. Pt denies  bleeding with surgeries He reports he underwent lipoma removal  on his right upper thigh , oozing and hematoma formation that needed evacuation 2 weeks post op after but no hemostatic treatment nor transfusion. He did not require any blood transfusions. He also has significant epistaxis for which he was followed by ENT. He reports gum bleeds with brushing but not every time.Last episode of epistaxis > 1 year ago.  He denies any family history of abnormal bleeding or malignancy. He is never had any dental extractions. Surgical intervention involving  laparoscopic appendectomy     Oncology Treatment Plan:   [No matching plan found]    Medications:  Continuous Infusions:   sodium chloride 0.9%   Intravenous Continuous 75 mL/hr at 06/12/24 0740 New Bag at 06/12/24 0740     Scheduled Meds:   acetaminophen  1,000 mg Intravenous Q8H    Followed by    [START ON 6/13/2024] acetaminophen  1,000 mg Oral Q8H    docusate sodium  100 mg Oral Daily    piperacillin-tazobactam (Zosyn) IV (PEDS and ADULTS) (extended infusion is not appropriate)  4.5 g Intravenous Q8H     PRN Meds:  Current Facility-Administered Medications:     [START ON 6/13/2024]  antihemophilic factor-vwf, 40 Units/kg, Intravenous, See admin instructions    bisacodyL, 10 mg, Rectal, Daily PRN    HYDROmorphone, 0.5 mg, Intravenous, Q3H PRN    naloxone, 0.02 mg, Intravenous, PRN    ondansetron, 4 mg, Intravenous, Q6H PRN    oxyCODONE, 5 mg, Oral, Q6H PRN    polyethylene glycol, 17 g, Oral, Daily PRN    sodium chloride 0.9%, 10 mL, Intravenous, Q12H PRN     Review of patient's allergies indicates:   Allergen Reactions    Flexeril [cyclobenzaprine] Hives        Past Medical History:   Diagnosis Date    ADHD (attention deficit hyperactivity disorder)     Anxiety     Chronic back pain     Depression     Insomnia     Migraine headache     OCD (obsessive compulsive disorder)     Seasonal allergies      Past Surgical History:   Procedure Laterality Date    CIRCUMCISION      KNEE ARTHROSCOPY      PATELLA REALIGNMENT       Family History       Problem Relation (Age of Onset)    Hypertension Father, Paternal Grandfather          Tobacco Use    Smoking status: Former     Types: Cigarettes    Smokeless tobacco: Never   Substance and Sexual Activity    Alcohol use: Yes     Comment: social    Drug use: Yes     Types: Marijuana    Sexual activity: Yes     Partners: Female     Birth control/protection: None       Review of Systems   Constitutional:  Negative for activity change, appetite change, fatigue and fever.   HENT:  Negative for congestion, facial swelling, sinus pressure and sore throat.    Eyes:  Negative for visual disturbance.   Respiratory:  Negative for cough and shortness of breath.    Cardiovascular:  Negative for chest pain and palpitations.   Gastrointestinal:  Positive for abdominal pain and nausea. Negative for abdominal distention, constipation, diarrhea and vomiting.   Genitourinary:  Negative for difficulty urinating, dysuria and urgency.   Musculoskeletal:  Negative for back pain and myalgias.   Neurological:  Negative for dizziness and weakness.     Objective:     Vital Signs (Most  Recent):  Temp: 97.6 °F (36.4 °C) (06/12/24 1242)  Pulse: 68 (06/12/24 1242)  Resp: 16 (06/12/24 1302)  BP: 118/70 (06/12/24 1242)  SpO2: 98 % (06/12/24 1242) Vital Signs (24h Range):  Temp:  [97.6 °F (36.4 °C)-98.7 °F (37.1 °C)] 97.6 °F (36.4 °C)  Pulse:  [68-87] 68  Resp:  [16-20] 16  SpO2:  [97 %-99 %] 98 %  BP: (115-149)/(59-78) 118/70     Weight: 68 kg (150 lb)  Body mass index is 19.79 kg/m².  Body surface area is 1.87 meters squared.      Intake/Output Summary (Last 24 hours) at 6/12/2024 1616  Last data filed at 6/12/2024 1152  Gross per 24 hour   Intake 1100 ml   Output 500 ml   Net 600 ml        Physical Exam  Constitutional:       General: He is not in acute distress.     Appearance: He is not ill-appearing.   HENT:      Head: Normocephalic and atraumatic.      Mouth/Throat:      Mouth: Mucous membranes are dry.   Eyes:      General: No scleral icterus.     Extraocular Movements: Extraocular movements intact.   Cardiovascular:      Rate and Rhythm: Normal rate and regular rhythm.   Pulmonary:      Effort: Pulmonary effort is normal.      Breath sounds: Normal breath sounds. No wheezing.   Abdominal:      Palpations: Abdomen is soft.      Tenderness: There is abdominal tenderness. There is guarding and rebound.      Comments: RLQ tenderness    Musculoskeletal:         General: Normal range of motion.      Cervical back: Normal range of motion.   Skin:     General: Skin is warm and dry.   Neurological:      Mental Status: He is alert and oriented to person, place, and time.   Psychiatric:         Mood and Affect: Mood normal.         Behavior: Behavior normal.         Thought Content: Thought content normal.          Significant Labs:   All pertinent labs from the last 24 hours have been reviewed.    Diagnostic Results:  I have reviewed and interpreted all pertinent imaging results/findings within the past 24 hours.  Assessment/Plan:     Von Willebrand disease  40 y/o with Type 1vWD admitted with acute  appendicitis with surgical intervention involving lap appendectomy planned  Pt followed by Hematology at outside facility   He has not received factor replacement in past   Labs at [facility] shows nl multimers, VW act 37% , vwAg 85, FV %   Plan loading dose with factor replacement ( humate) 40 units/kg 30 min prior to procedure  Followed by maintenance dosing approx 30 units/kg  for a few days post op starting on POD # 1  [facility] Hematology team have been advised of admission and will coordinate close follow up as outpatient  Monitor daily F 8 levels  vWprofile testing pending    Plan discussed with Dr. Herrera  Plan also discussed with pathologist Dr. Buckley and pharmacist Khang Campa          Thank you for your consult.     Nevaeh Lacey MD  Hematology/Oncology  [facility] [place] - Emergency Dept

## 2024-06-12 NOTE — H&P
[facility] Medicine  History & Physical    Patient Name: Buddy Parrish  MRN: [id number]  Patient Class: OP- Observation  Admission Date: 6/12/2024  Attending Physician: Kalyani Anderson MD   Primary Care Provider: Jose Angel Ca MD         Patient information was obtained from patient and ER records.     Subjective:     Principal Problem:Acute appendicitis    Chief Complaint:   Chief Complaint   Patient presents with    Abdominal Pain     C/o RLQ pain that started this am as a gradual nagging pain. Pt states pain is now sharp and 10/10. Increased pain with palpation. Pt appears in pain. Denies nausea, vomiting or bowel/urinary complaints.        HPI: 39 year old male with history of Von Willebrand disease present with complaint of right lower quadrant abdominal pain. Starting around 7 am he began to have deepthi mild periumbilical discomfort.  States throughout the day pain has become more severe, now mostly to the right lower quadrant. this evening around 7-8 p.m. pain became more severe.  Pain has been constant throughout the day. He also complains of nausea without vomiting. Decreased appetite and intake since onset of symptoms.  Normal BMs.  Denies history of similar symptoms.  No fever chills myalgias.  No meds taken prior to arrival.  Pain has begun to involve his right-sided flank and right low back.  Denies history nephrolithiasis.         In the ED:   Labs: cbc and cmp unremarkable  CT abd/pelvis: 1. Findings in keeping with uncomplicated acute appendicitis. No evidence of perforation or drainable fluid collection/abscess formation at the present time.     Past Medical History:   Diagnosis Date    ADHD (attention deficit hyperactivity disorder)     Anxiety     Chronic back pain     Depression     Insomnia     Migraine headache     OCD (obsessive compulsive disorder)     Seasonal allergies        Past Surgical History:   Procedure Laterality Date    CIRCUMCISION      KNEE  ARTHROSCOPY      PATELLA REALIGNMENT         Review of patient's allergies indicates:   Allergen Reactions    Flexeril [cyclobenzaprine] Hives       No current facility-administered medications on file prior to encounter.     Current Outpatient Medications on File Prior to Encounter   Medication Sig    cyanocobalamin, vitamin B-12, (VITAMIN B-12) 50 mcg tablet Take 50 mcg by mouth once daily.    [DISCONTINUED] amoxicillin-clavulanate 875-125mg (AUGMENTIN) 875-125 mg per tablet Take 1 tablet by mouth 2 (two) times daily.    [DISCONTINUED] HYDROcodone-acetaminophen (NORCO) 5-325 mg per tablet Take 1 tablet by mouth every 6 (six) hours as needed (Severe/breakthrough pain).    [DISCONTINUED] ibuprofen (ADVIL,MOTRIN) 600 MG tablet ibuprofen 600 mg tablet    [DISCONTINUED] LIDOcaine (LIDODERM) 5 % Place 1 patch onto the skin once daily. Remove & Discard patch within 12 hours or as directed by MD    [DISCONTINUED] methylPREDNISolone (MEDROL DOSEPACK) 4 mg tablet use as directed    [DISCONTINUED] mupirocin (BACTROBAN) 2 % ointment Apply topically 3 (three) times daily.    [DISCONTINUED] naproxen (NAPROSYN) 500 MG tablet Take 1 tablet (500 mg total) by mouth 2 (two) times daily with meals.    [DISCONTINUED] ondansetron (ZOFRAN-ODT) 4 MG TbDL Take 1 tablet (4 mg total) by mouth every 6 (six) hours as needed (Nausea).     Family History       Problem Relation (Age of Onset)    Hypertension Father, Paternal Grandfather          Tobacco Use    Smoking status: Former     Types: Cigarettes    Smokeless tobacco: Never   Substance and Sexual Activity    Alcohol use: Yes     Comment: social    Drug use: Yes     Types: Marijuana    Sexual activity: Yes     Partners: Female     Birth control/protection: None     Review of Systems   Gastrointestinal:  Positive for abdominal pain and nausea.   All other systems reviewed and are negative.    Objective:     Vital Signs (Most Recent):  Temp: 98 °F (36.7 °C) (06/12/24 1116)  Pulse: 70  (06/12/24 1116)  Resp: 16 (06/12/24 1116)  BP: (!) 115/59 (06/12/24 1116)  SpO2: 97 % (06/12/24 1116) Vital Signs (24h Range):  Temp:  [97.8 °F (36.6 °C)-98.7 °F (37.1 °C)] 98 °F (36.7 °C)  Pulse:  [70-87] 70  Resp:  [16-20] 16  SpO2:  [97 %-99 %] 97 %  BP: (115-149)/(59-78) 115/59     Weight: 68 kg (150 lb)  Body mass index is 19.79 kg/m².     Physical Exam  Constitutional:       General: He is in acute distress.      Appearance: He is not ill-appearing.   HENT:      Head: Normocephalic and atraumatic.      Mouth/Throat:      Mouth: Mucous membranes are dry.   Eyes:      Extraocular Movements: Extraocular movements intact.   Cardiovascular:      Rate and Rhythm: Normal rate and regular rhythm.   Pulmonary:      Effort: Pulmonary effort is normal.      Breath sounds: Normal breath sounds.   Abdominal:      Palpations: Abdomen is soft.      Tenderness: There is abdominal tenderness. There is guarding and rebound.      Comments: RLQ tenderness    Musculoskeletal:         General: Normal range of motion.      Cervical back: Normal range of motion.   Skin:     General: Skin is warm and dry.   Neurological:      Mental Status: He is alert and oriented to person, place, and time. Mental status is at baseline.   Psychiatric:         Mood and Affect: Mood normal.         Behavior: Behavior normal.         Thought Content: Thought content normal.         Judgment: Judgment normal.                Significant Labs: All pertinent labs within the past 24 hours have been reviewed.    Significant Imaging: I have reviewed all pertinent imaging results/findings within the past 24 hours.  Assessment/Plan:     * Acute appendicitis  -consulted general surgery  -NPO  -IVF  -on Zosyn   -pain control   -antiemetic as needed   -bowel regimen  -supportive care       Von Willebrand disease  Consult hema/onc        VTE Risk Mitigation (From admission, onward)           Ordered     IP VTE LOW RISK PATIENT  Once         06/12/24 0837     Place  sequential compression device  Until discontinued         06/12/24 0837                         On 06/12/2024, patient should be placed in hospital observation services under my care in collaboration with Dr. Anderson.      AdmissionCare    Guideline: Abdominal Pain, Undiagnosed, Observation    Based on the indications selected for the patient, the bed status of Observation was determined to be MET    The following indications were selected as present at the time of evaluation of the patient:      - Suspected condition requiring continued monitoring or testing beyond emergency department care (eg, ectopic pregnancy, appendicitis, bowel ischemia)    AdmissionCare documentation entered by: Tania Naik    [facility], 28th edition, Copyright © 2024 [facility] [employer], [facility] All Rights Reserved.  [id number]    Marielena Kelley NP  Department of Hospital Medicine  [facility] - [place] - Emergency Dept

## 2024-06-13 ENCOUNTER — ANESTHESIA (OUTPATIENT)
Dept: SURGERY | Facility: HOSPITAL | Age: 39
DRG: 398 | End: 2024-06-13
Payer: MEDICARE

## 2024-06-13 LAB
ALBUMIN SERPL BCP-MCNC: 3.5 G/DL (ref 3.5–5.2)
ALP SERPL-CCNC: 73 U/L (ref 55–135)
ALT SERPL W/O P-5'-P-CCNC: 13 U/L (ref 10–44)
ANION GAP SERPL CALC-SCNC: 10 MMOL/L (ref 8–16)
AST SERPL-CCNC: 14 U/L (ref 10–40)
BILIRUB SERPL-MCNC: 1.7 MG/DL (ref 0.1–1)
BUN SERPL-MCNC: 9 MG/DL (ref 6–20)
CALCIUM SERPL-MCNC: 9.2 MG/DL (ref 8.7–10.5)
CHLORIDE SERPL-SCNC: 102 MMOL/L (ref 95–110)
CO2 SERPL-SCNC: 26 MMOL/L (ref 23–29)
CREAT SERPL-MCNC: 1.3 MG/DL (ref 0.5–1.4)
ERYTHROCYTE [DISTWIDTH] IN BLOOD BY AUTOMATED COUNT: 14 % (ref 11.5–14.5)
EST. GFR  (NO RACE VARIABLE): >60 ML/MIN/1.73 M^2
GLUCOSE SERPL-MCNC: 71 MG/DL (ref 70–110)
HCT VFR BLD AUTO: 41.7 % (ref 40–54)
HGB BLD-MCNC: 13.5 G/DL (ref 14–18)
MAGNESIUM SERPL-MCNC: 1.7 MG/DL (ref 1.6–2.6)
MCH RBC QN AUTO: 26.8 PG (ref 27–31)
MCHC RBC AUTO-ENTMCNC: 32.4 G/DL (ref 32–36)
MCV RBC AUTO: 83 FL (ref 82–98)
PHOSPHATE SERPL-MCNC: 3.8 MG/DL (ref 2.7–4.5)
PLATELET # BLD AUTO: 206 K/UL (ref 150–450)
PMV BLD AUTO: 11.8 FL (ref 9.2–12.9)
POTASSIUM SERPL-SCNC: 3.6 MMOL/L (ref 3.5–5.1)
PROT SERPL-MCNC: 7 G/DL (ref 6–8.4)
RBC # BLD AUTO: 5.03 M/UL (ref 4.6–6.2)
SODIUM SERPL-SCNC: 138 MMOL/L (ref 136–145)
WBC # BLD AUTO: 5.18 K/UL (ref 3.9–12.7)

## 2024-06-13 PROCEDURE — 11000001 HC ACUTE MED/SURG PRIVATE ROOM

## 2024-06-13 PROCEDURE — 85397 CLOTTING FUNCT ACTIVITY: CPT | Performed by: INTERNAL MEDICINE

## 2024-06-13 PROCEDURE — 71000033 HC RECOVERY, INTIAL HOUR: Performed by: SURGERY

## 2024-06-13 PROCEDURE — 63600175 PHARM REV CODE 636 W HCPCS: Performed by: ANESTHESIOLOGY

## 2024-06-13 PROCEDURE — 0DTJ4ZZ RESECTION OF APPENDIX, PERCUTANEOUS ENDOSCOPIC APPROACH: ICD-10-PCS | Performed by: SURGERY

## 2024-06-13 PROCEDURE — 37000008 HC ANESTHESIA 1ST 15 MINUTES: Performed by: SURGERY

## 2024-06-13 PROCEDURE — 84100 ASSAY OF PHOSPHORUS: CPT | Performed by: NURSE PRACTITIONER

## 2024-06-13 PROCEDURE — 85027 COMPLETE CBC AUTOMATED: CPT | Performed by: NURSE PRACTITIONER

## 2024-06-13 PROCEDURE — 44970 LAPAROSCOPY APPENDECTOMY: CPT | Mod: ,,, | Performed by: SURGERY

## 2024-06-13 PROCEDURE — 83735 ASSAY OF MAGNESIUM: CPT | Performed by: NURSE PRACTITIONER

## 2024-06-13 PROCEDURE — 36000708 HC OR TIME LEV III 1ST 15 MIN: Performed by: SURGERY

## 2024-06-13 PROCEDURE — 36415 COLL VENOUS BLD VENIPUNCTURE: CPT | Performed by: NURSE PRACTITIONER

## 2024-06-13 PROCEDURE — 25000003 PHARM REV CODE 250: Performed by: SURGERY

## 2024-06-13 PROCEDURE — 25000003 PHARM REV CODE 250

## 2024-06-13 PROCEDURE — 63600175 PHARM REV CODE 636 W HCPCS: Performed by: STUDENT IN AN ORGANIZED HEALTH CARE EDUCATION/TRAINING PROGRAM

## 2024-06-13 PROCEDURE — 88304 TISSUE EXAM BY PATHOLOGIST: CPT | Performed by: PATHOLOGY

## 2024-06-13 PROCEDURE — 25000003 PHARM REV CODE 250: Performed by: NURSE PRACTITIONER

## 2024-06-13 PROCEDURE — 80053 COMPREHEN METABOLIC PANEL: CPT | Performed by: NURSE PRACTITIONER

## 2024-06-13 PROCEDURE — 36000709 HC OR TIME LEV III EA ADD 15 MIN: Performed by: SURGERY

## 2024-06-13 PROCEDURE — 71000039 HC RECOVERY, EACH ADD'L HOUR: Performed by: SURGERY

## 2024-06-13 PROCEDURE — 25000003 PHARM REV CODE 250: Performed by: STUDENT IN AN ORGANIZED HEALTH CARE EDUCATION/TRAINING PROGRAM

## 2024-06-13 PROCEDURE — 37000009 HC ANESTHESIA EA ADD 15 MINS: Performed by: SURGERY

## 2024-06-13 PROCEDURE — 88304 TISSUE EXAM BY PATHOLOGIST: CPT | Mod: 26,,, | Performed by: PATHOLOGY

## 2024-06-13 PROCEDURE — 36415 COLL VENOUS BLD VENIPUNCTURE: CPT | Performed by: INTERNAL MEDICINE

## 2024-06-13 PROCEDURE — 63600531 PHARM REV CODE 636 NO ALT 250 W HCPCS: Mod: JZ,JG | Performed by: INTERNAL MEDICINE

## 2024-06-13 PROCEDURE — 25000003 PHARM REV CODE 250: Performed by: ANESTHESIOLOGY

## 2024-06-13 PROCEDURE — 63600175 PHARM REV CODE 636 W HCPCS: Performed by: NURSE PRACTITIONER

## 2024-06-13 PROCEDURE — 27201423 OPTIME MED/SURG SUP & DEVICES STERILE SUPPLY: Performed by: SURGERY

## 2024-06-13 PROCEDURE — 63600175 PHARM REV CODE 636 W HCPCS: Mod: JZ,JG

## 2024-06-13 RX ORDER — DEXMEDETOMIDINE HYDROCHLORIDE 100 UG/ML
INJECTION, SOLUTION INTRAVENOUS
Status: DISCONTINUED | OUTPATIENT
Start: 2024-06-13 | End: 2024-06-13

## 2024-06-13 RX ORDER — PROPOFOL 10 MG/ML
VIAL (ML) INTRAVENOUS
Status: DISCONTINUED | OUTPATIENT
Start: 2024-06-13 | End: 2024-06-13

## 2024-06-13 RX ORDER — SODIUM CHLORIDE 0.9 % (FLUSH) 0.9 %
10 SYRINGE (ML) INJECTION
Status: DISCONTINUED | OUTPATIENT
Start: 2024-06-13 | End: 2024-06-13 | Stop reason: HOSPADM

## 2024-06-13 RX ORDER — ONDANSETRON HYDROCHLORIDE 2 MG/ML
INJECTION, SOLUTION INTRAVENOUS
Status: DISCONTINUED | OUTPATIENT
Start: 2024-06-13 | End: 2024-06-13

## 2024-06-13 RX ORDER — LIDOCAINE HYDROCHLORIDE 20 MG/ML
INJECTION INTRAVENOUS
Status: DISCONTINUED | OUTPATIENT
Start: 2024-06-13 | End: 2024-06-13

## 2024-06-13 RX ORDER — OXYCODONE HYDROCHLORIDE 5 MG/1
5 TABLET ORAL EVERY 4 HOURS PRN
Status: DISCONTINUED | OUTPATIENT
Start: 2024-06-13 | End: 2024-06-15 | Stop reason: HOSPADM

## 2024-06-13 RX ORDER — FENTANYL CITRATE 50 UG/ML
INJECTION, SOLUTION INTRAMUSCULAR; INTRAVENOUS
Status: DISCONTINUED | OUTPATIENT
Start: 2024-06-13 | End: 2024-06-13

## 2024-06-13 RX ORDER — ROCURONIUM BROMIDE 10 MG/ML
INJECTION, SOLUTION INTRAVENOUS
Status: DISCONTINUED | OUTPATIENT
Start: 2024-06-13 | End: 2024-06-13

## 2024-06-13 RX ORDER — MIDAZOLAM HYDROCHLORIDE 1 MG/ML
INJECTION INTRAMUSCULAR; INTRAVENOUS
Status: DISCONTINUED | OUTPATIENT
Start: 2024-06-13 | End: 2024-06-13

## 2024-06-13 RX ORDER — HYDROMORPHONE HYDROCHLORIDE 2 MG/ML
0.2 INJECTION, SOLUTION INTRAMUSCULAR; INTRAVENOUS; SUBCUTANEOUS EVERY 5 MIN PRN
Status: DISCONTINUED | OUTPATIENT
Start: 2024-06-13 | End: 2024-06-13 | Stop reason: HOSPADM

## 2024-06-13 RX ORDER — OXYCODONE HYDROCHLORIDE 5 MG/1
10 TABLET ORAL EVERY 4 HOURS PRN
Status: DISCONTINUED | OUTPATIENT
Start: 2024-06-13 | End: 2024-06-15 | Stop reason: HOSPADM

## 2024-06-13 RX ORDER — DEXAMETHASONE SODIUM PHOSPHATE 4 MG/ML
INJECTION, SOLUTION INTRA-ARTICULAR; INTRALESIONAL; INTRAMUSCULAR; INTRAVENOUS; SOFT TISSUE
Status: DISCONTINUED | OUTPATIENT
Start: 2024-06-13 | End: 2024-06-13

## 2024-06-13 RX ORDER — HYDROMORPHONE HYDROCHLORIDE 1 MG/ML
0.5 INJECTION, SOLUTION INTRAMUSCULAR; INTRAVENOUS; SUBCUTANEOUS
Status: DISCONTINUED | OUTPATIENT
Start: 2024-06-13 | End: 2024-06-14

## 2024-06-13 RX ORDER — BUPIVACAINE HYDROCHLORIDE AND EPINEPHRINE 2.5; 5 MG/ML; UG/ML
INJECTION, SOLUTION EPIDURAL; INFILTRATION; INTRACAUDAL; PERINEURAL
Status: DISCONTINUED | OUTPATIENT
Start: 2024-06-13 | End: 2024-06-13 | Stop reason: HOSPADM

## 2024-06-13 RX ADMIN — METHOCARBAMOL 1000 MG: 100 INJECTION INTRAMUSCULAR; INTRAVENOUS at 02:06

## 2024-06-13 RX ADMIN — PROPOFOL 200 MG: 10 INJECTION, EMULSION INTRAVENOUS at 01:06

## 2024-06-13 RX ADMIN — ACETAMINOPHEN 1000 MG: 10 INJECTION INTRAVENOUS at 06:06

## 2024-06-13 RX ADMIN — HYDROMORPHONE HYDROCHLORIDE 0.2 MG: 2 INJECTION INTRAMUSCULAR; INTRAVENOUS; SUBCUTANEOUS at 02:06

## 2024-06-13 RX ADMIN — LIDOCAINE HYDROCHLORIDE 100 MG: 20 INJECTION, SOLUTION INTRAVENOUS at 01:06

## 2024-06-13 RX ADMIN — SUGAMMADEX 200 MG: 100 INJECTION, SOLUTION INTRAVENOUS at 01:06

## 2024-06-13 RX ADMIN — ONDANSETRON 4 MG: 2 INJECTION, SOLUTION INTRAMUSCULAR; INTRAVENOUS at 01:06

## 2024-06-13 RX ADMIN — ONDANSETRON 4 MG: 2 INJECTION INTRAMUSCULAR; INTRAVENOUS at 06:06

## 2024-06-13 RX ADMIN — ROCURONIUM BROMIDE 50 MG: 10 INJECTION INTRAVENOUS at 01:06

## 2024-06-13 RX ADMIN — HYDROMORPHONE HYDROCHLORIDE 0.5 MG: 1 INJECTION, SOLUTION INTRAMUSCULAR; INTRAVENOUS; SUBCUTANEOUS at 05:06

## 2024-06-13 RX ADMIN — HYDROMORPHONE HYDROCHLORIDE 0.5 MG: 1 INJECTION, SOLUTION INTRAMUSCULAR; INTRAVENOUS; SUBCUTANEOUS at 06:06

## 2024-06-13 RX ADMIN — GLYCOPYRROLATE 0.2 MG: 0.2 INJECTION, SOLUTION INTRAMUSCULAR; INTRAVITREAL at 01:06

## 2024-06-13 RX ADMIN — ACETAMINOPHEN 1000 MG: 500 TABLET ORAL at 09:06

## 2024-06-13 RX ADMIN — Medication 2620 UNITS: at 12:06

## 2024-06-13 RX ADMIN — HYDROMORPHONE HYDROCHLORIDE 0.5 MG: 1 INJECTION, SOLUTION INTRAMUSCULAR; INTRAVENOUS; SUBCUTANEOUS at 09:06

## 2024-06-13 RX ADMIN — SODIUM CHLORIDE: 9 INJECTION, SOLUTION INTRAVENOUS at 04:06

## 2024-06-13 RX ADMIN — SODIUM CHLORIDE, SODIUM LACTATE, POTASSIUM CHLORIDE, AND CALCIUM CHLORIDE: .6; .31; .03; .02 INJECTION, SOLUTION INTRAVENOUS at 12:06

## 2024-06-13 RX ADMIN — OXYCODONE 10 MG: 5 TABLET ORAL at 04:06

## 2024-06-13 RX ADMIN — PIPERACILLIN AND TAZOBACTAM 4.5 G: 4; .5 INJECTION, POWDER, LYOPHILIZED, FOR SOLUTION INTRAVENOUS; PARENTERAL at 07:06

## 2024-06-13 RX ADMIN — SODIUM CHLORIDE, SODIUM LACTATE, POTASSIUM CHLORIDE, AND CALCIUM CHLORIDE: .6; .31; .03; .02 INJECTION, SOLUTION INTRAVENOUS at 01:06

## 2024-06-13 RX ADMIN — DEXMEDETOMIDINE HYDROCHLORIDE 8 MCG: 100 INJECTION, SOLUTION INTRAVENOUS at 01:06

## 2024-06-13 RX ADMIN — FENTANYL CITRATE 100 MCG: 50 INJECTION, SOLUTION INTRAMUSCULAR; INTRAVENOUS at 01:06

## 2024-06-13 RX ADMIN — DEXAMETHASONE SODIUM PHOSPHATE 4 MG: 4 INJECTION, SOLUTION INTRAMUSCULAR; INTRAVENOUS at 01:06

## 2024-06-13 RX ADMIN — DEXTROSE 4.5 G: 50 INJECTION, SOLUTION INTRAVENOUS at 01:06

## 2024-06-13 RX ADMIN — MIDAZOLAM HYDROCHLORIDE 2 MG: 1 INJECTION INTRAMUSCULAR; INTRAVENOUS at 12:06

## 2024-06-13 RX ADMIN — DEXMEDETOMIDINE HYDROCHLORIDE 4 MCG: 100 INJECTION, SOLUTION INTRAVENOUS at 01:06

## 2024-06-13 NOTE — PLAN OF CARE
Hematology/Oncology Plan of Care    39 y.o. male with history of vWd type 1 admitted with acute appendicitis  Pt received factor replacement pre op. He underwent lap appendectomy earlier today w/out bleeding complications.     Plan 1. Begin maintenance replacement therapy with Humate P  30 units/kg on POD #1 for first week           2. Pt will remain inpatient for at least 2-3 days post op to monitor for any bleeding complications           3. Close follow up at [facility] planned lauren continue  replacement therapy as outpatient            4. Monitor daily factor levels           5. vWprofle labs from admit still pending         Nevaeh Lacey MD  Hematology/Oncology

## 2024-06-13 NOTE — PROGRESS NOTES
[facility]  Nurses Note -- 4 Eyes      6/12/2024       Skin assessed on: Q Shift      [x] No Pressure Injuries Present    []Prevention Measures Documented    [] Yes LDA  for Pressure Injury Previously documented     [] Yes New Pressure Injury Discovered   [] LDA for New Pressure Injury Added      Attending RN:  Miranda James RN     Second RN:  LOIS Love

## 2024-06-13 NOTE — SUBJECTIVE & OBJECTIVE
Interval History: No issue overnight. Pain better. OR today pending Humate availability b ut sounds like we are able to proceed.     Medications:  Continuous Infusions:   sodium chloride 0.9%   Intravenous Continuous 75 mL/hr at 06/13/24 0417 New Bag at 06/13/24 0417     Scheduled Meds:   acetaminophen  1,000 mg Oral Q8H    docusate sodium  100 mg Oral Daily    piperacillin-tazobactam (Zosyn) IV (PEDS and ADULTS) (extended infusion is not appropriate)  4.5 g Intravenous Q8H     PRN Meds:  Current Facility-Administered Medications:     antihemophilic factor-vwf, 40 Units/kg, Intravenous, See admin instructions    bisacodyL, 10 mg, Rectal, Daily PRN    HYDROmorphone, 0.5 mg, Intravenous, Q3H PRN    naloxone, 0.02 mg, Intravenous, PRN    ondansetron, 4 mg, Intravenous, Q6H PRN    oxyCODONE, 5 mg, Oral, Q6H PRN    polyethylene glycol, 17 g, Oral, Daily PRN    sodium chloride 0.9%, 10 mL, Intravenous, Q12H PRN     Review of patient's allergies indicates:   Allergen Reactions    Flexeril [cyclobenzaprine] Hives and Swelling     Objective:     Vital Signs (Most Recent):  Temp: 98.6 °F (37 °C) (06/13/24 0431)  Pulse: (!) 55 (06/13/24 0431)  Resp: 18 (06/13/24 0504)  BP: (!) 124/58 (06/13/24 0431)  SpO2: 98 % (06/13/24 0431) Vital Signs (24h Range):  Temp:  [97.6 °F (36.4 °C)-98.6 °F (37 °C)] 98.6 °F (37 °C)  Pulse:  [55-76] 55  Resp:  [11-19] 18  SpO2:  [96 %-99 %] 98 %  BP: (114-135)/(58-73) 124/58     Weight: 68 kg (150 lb)  Body mass index is 19.79 kg/m².    Intake/Output - Last 3 Shifts         06/11 0700  06/12 0659 06/12 0700 06/13 0659 06/13 0700 06/14 0659    P.O.  480     IV Piggyback 1000 100     Total Intake(mL/kg) 1000 (14.7) 580 (8.5)     Urine (mL/kg/hr)  503 (0.3)     Total Output  503     Net +1000 +77            Urine Occurrence  3 x              Physical Exam  Constitutional:       General: He is not in acute distress.  HENT:      Head: Normocephalic and atraumatic.   Eyes:      Extraocular Movements:  Extraocular movements intact.      Conjunctiva/sclera: Conjunctivae normal.      Pupils: Pupils are equal, round, and reactive to light.   Cardiovascular:      Rate and Rhythm: Normal rate and regular rhythm.   Pulmonary:      Effort: Pulmonary effort is normal. No respiratory distress.   Abdominal:      Palpations: Abdomen is soft.      Comments: RLQ tenderness with rebound and guarding    Neurological:      General: No focal deficit present.      Mental Status: He is alert.          Significant Labs:  I have reviewed all pertinent lab results within the past 24 hours.    Significant Diagnostics:  I have reviewed all pertinent imaging results/findings within the past 24 hours.

## 2024-06-13 NOTE — ANESTHESIA PROCEDURE NOTES
Intubation    Date/Time: 6/13/2024 1:09 PM    Performed by: Penny Rodriguez CRNA  Authorized by: Cynthia Licea MD    Intubation:     Induction:  Intravenous    Intubated:  Postinduction    Mask Ventilation:  Easy mask    Attempts:  1    Attempted By:  CRNA    Method of Intubation:  Video laryngoscopy    Blade:  Zhu 3    Laryngeal View Grade: Grade I - full view of cords      Difficult Airway Encountered?: No      Complications:  None    Airway Device:  Oral endotracheal tube    Airway Device Size:  7.5    Tube secured:  22    Secured at:  The lips    Placement Verified By:  Capnometry    Complicating Factors:  None    Findings Post-Intubation:  BS equal bilateral and atraumatic/condition of teeth unchanged

## 2024-06-13 NOTE — NURSING
[facility]  Nurses Note -- 4 Eyes      6/13/2024       Skin assessed on: Q Shift      [x] No Pressure Injuries Present    [x]Prevention Measures Documented    [] Yes LDA  for Pressure Injury Previously documented     [] Yes New Pressure Injury Discovered   [] LDA for New Pressure Injury Added      Attending RN:  Raul Davis, RN     Second RN:  Miranda DOMINGUEZ RN

## 2024-06-13 NOTE — SUBJECTIVE & OBJECTIVE
Interval History: patient seen and examined. Patient will have appendectomy today with Humate dose ordered prior with repeat doses post op. Patient reports pain a little better.     Review of Systems   Gastrointestinal:  Positive for abdominal pain and nausea.   All other systems reviewed and are negative.    Objective:     Vital Signs (Most Recent):  Temp: 98.5 °F (36.9 °C) (06/13/24 0722)  Pulse: (!) 51 (06/13/24 0722)  Resp: 18 (06/13/24 0722)  BP: 100/62 (06/13/24 0722)  SpO2: 99 % (06/13/24 0722) Vital Signs (24h Range):  Temp:  [97.6 °F (36.4 °C)-98.6 °F (37 °C)] 98.5 °F (36.9 °C)  Pulse:  [51-70] 51  Resp:  [11-19] 18  SpO2:  [96 %-99 %] 99 %  BP: (100-135)/(58-73) 100/62     Weight: 68 kg (150 lb)  Body mass index is 19.79 kg/m².    Intake/Output Summary (Last 24 hours) at 6/13/2024 0830  Last data filed at 6/13/2024 0357  Gross per 24 hour   Intake 580 ml   Output 503 ml   Net 77 ml         Physical Exam  Constitutional:       General: He is not in acute distress.     Appearance: He is not ill-appearing.   HENT:      Head: Normocephalic and atraumatic.      Mouth/Throat:      Mouth: Mucous membranes are dry.   Eyes:      Extraocular Movements: Extraocular movements intact.   Cardiovascular:      Rate and Rhythm: Normal rate and regular rhythm.   Pulmonary:      Effort: Pulmonary effort is normal.      Breath sounds: Normal breath sounds.   Abdominal:      Palpations: Abdomen is soft.      Tenderness: There is abdominal tenderness. There is guarding and rebound.      Comments: RLQ tenderness    Musculoskeletal:         General: Normal range of motion.      Cervical back: Normal range of motion.   Skin:     General: Skin is warm and dry.   Neurological:      Mental Status: He is alert and oriented to person, place, and time. Mental status is at baseline.   Psychiatric:         Mood and Affect: Mood normal.         Behavior: Behavior normal.         Thought Content: Thought content normal.         Judgment:  Judgment normal.             Significant Labs: All pertinent labs within the past 24 hours have been reviewed.    Significant Imaging: I have reviewed all pertinent imaging results/findings within the past 24 hours.

## 2024-06-13 NOTE — HOSPITAL COURSE
39 year old admitted for acute appendicitis.  Patient with hx of von willebrand disease and Heme/Onc consulted.  Patient started on Humate.  Underwent lap appendectomy on 6/13 without complications.

## 2024-06-13 NOTE — PROGRESS NOTES
[facility] Medicine  Progress Note    Patient Name: Buddy Parrish  MRN: [id number]  Patient Class: OP- Observation   Admission Date: 6/12/2024  Length of Stay: 0 days  Attending Physician: Kalyani Anderson MD  Primary Care Provider: Jose Angel Ca MD        Subjective:     Principal Problem:Acute appendicitis        HPI:  39 year old male with history of Von Willebrand disease present with complaint of right lower quadrant abdominal pain. Starting around 7 am he began to have deepthi mild periumbilical discomfort.  States throughout the day pain has become more severe, now mostly to the right lower quadrant. this evening around 7-8 p.m. pain became more severe.  Pain has been constant throughout the day. He also complains of nausea without vomiting. Decreased appetite and intake since onset of symptoms.  Normal BMs.  Denies history of similar symptoms.  No fever chills myalgias.  No meds taken prior to arrival.  Pain has begun to involve his right-sided flank and right low back.  Denies history nephrolithiasis.         In the ED:   Labs: cbc and cmp unremarkable  CT abd/pelvis: 1. Findings in keeping with uncomplicated acute appendicitis. No evidence of perforation or drainable fluid collection/abscess formation at the present time.     Overview/Hospital Course:  39 year old admitted for acute appendicitis    Labs: cbc and cmp unremarkable  CT abd/pelvis: 1. Findings in keeping with uncomplicated acute appendicitis. No evidence of perforation or drainable fluid collection/abscess formation at the present time.     Consulted general surgery: on zosyn, IVF, pain control. Patient will have surgical intervention on 6/13/24  Consulted hem/onc secondary to patient having von willebrand diease.  Pt followed by Hematology at outside facility .He has not received factor replacement in past .Labs at [facility] shows nl multimers, VW act 37% , vwAg 85, FV %. Plan loading dose with factor replacement (  humate) 40 units/kg 30 min prior to procedure. Followed by maintenance dosing approx 30 units/kg  for a few days post op starting on POD #1. [facility] Hematology team have been advised of admission and will coordinate close follow up as outpatient. Monitor daily F 8 levels. vWprofile testing pending      Interval History: patient seen and examined. Patient will have appendectomy today with Humate dose ordered prior with repeat doses post op. Patient reports pain a little better.     Review of Systems   Gastrointestinal:  Positive for abdominal pain and nausea.   All other systems reviewed and are negative.    Objective:     Vital Signs (Most Recent):  Temp: 98.5 °F (36.9 °C) (06/13/24 0722)  Pulse: (!) 51 (06/13/24 0722)  Resp: 18 (06/13/24 0722)  BP: 100/62 (06/13/24 0722)  SpO2: 99 % (06/13/24 0722) Vital Signs (24h Range):  Temp:  [97.6 °F (36.4 °C)-98.6 °F (37 °C)] 98.5 °F (36.9 °C)  Pulse:  [51-70] 51  Resp:  [11-19] 18  SpO2:  [96 %-99 %] 99 %  BP: (100-135)/(58-73) 100/62     Weight: 68 kg (150 lb)  Body mass index is 19.79 kg/m².    Intake/Output Summary (Last 24 hours) at 6/13/2024 0830  Last data filed at 6/13/2024 0357  Gross per 24 hour   Intake 580 ml   Output 503 ml   Net 77 ml         Physical Exam  Constitutional:       General: He is not in acute distress.     Appearance: He is not ill-appearing.   HENT:      Head: Normocephalic and atraumatic.      Mouth/Throat:      Mouth: Mucous membranes are dry.   Eyes:      Extraocular Movements: Extraocular movements intact.   Cardiovascular:      Rate and Rhythm: Normal rate and regular rhythm.   Pulmonary:      Effort: Pulmonary effort is normal.      Breath sounds: Normal breath sounds.   Abdominal:      Palpations: Abdomen is soft.      Tenderness: There is abdominal tenderness. There is guarding and rebound.      Comments: RLQ tenderness    Musculoskeletal:         General: Normal range of motion.      Cervical back: Normal range of motion.   Skin:      General: Skin is warm and dry.   Neurological:      Mental Status: He is alert and oriented to person, place, and time. Mental status is at baseline.   Psychiatric:         Mood and Affect: Mood normal.         Behavior: Behavior normal.         Thought Content: Thought content normal.         Judgment: Judgment normal.             Significant Labs: All pertinent labs within the past 24 hours have been reviewed.    Significant Imaging: I have reviewed all pertinent imaging results/findings within the past 24 hours.    Assessment/Plan:      * Acute appendicitis  -consulted general surgery  -NPO  -IVF  -on Zosyn   -pain control   -antiemetic as needed   -bowel regimen  -supportive care       Von Willebrand disease  Onboard hema/onc recs:  Pt followed by Hematology at outside facility He has not received factor replacement in past   Labs at [facility] shows nl multimers, VW act 37% , vwAg 85, FV %   Plan loading dose with factor replacement ( humate) 40 units/kg 30 min prior to procedure  Followed by maintenance dosing approx 30 units/kg  for a few days post op starting on POD # 1  [facility] Hematology team have been advised of admission and will coordinate close follow up as outpatient  Monitor daily F 8 levels  vWprofile testing pending        VTE Risk Mitigation (From admission, onward)           Ordered     IP VTE LOW RISK PATIENT  Once         06/12/24 0837     Place sequential compression device  Until discontinued         06/12/24 0837                    Discharge Planning   ALIS:      Code Status: Full Code   Is the patient medically ready for discharge?:     Reason for patient still in hospital (select all that apply): Patient trending condition and Consult recommendations  Discharge Plan A: Home          Hospital medicine will sign off. General surgery will take over as primary team. Re-consult if hospital medicine needed.        Marielena Kelley NP  Department of Hospital Medicine   [facility]

## 2024-06-13 NOTE — PROGRESS NOTES
[facility]  General Surgery  Progress Note    Subjective:     History of Present Illness:  Patient is a 39 y.o. male with history of von Willebrand disease who General Surgery was consulted for evaluation of acute appendicitis. Patient states that he started having periumbilical discomfort yesterday morning that progressed throughout the day. Became more severe and migrated to the RLQ. Associated with nausea without emesis. AFVSS on arrival to the ED. Labs largely wnl and without leukocytosis. CT was performed which revealed findings consistent with acute appendicitis as well as an intraluminal appendicolith. Patient denies history of abdominal surgery. No history of MI/stroke. Has von Willebrand disease and significant bleeding post-op from lipoma removal in 2022. Only takes B12 supplementation.       Post-Op Info:  Procedure(s) (LRB):  APPENDECTOMY, LAPAROSCOPIC (N/A)         Interval History: No issue overnight. Pain better. OR today pending Humate availability b ut sounds like we are able to proceed.     Medications:  Continuous Infusions:   sodium chloride 0.9%   Intravenous Continuous 75 mL/hr at 06/13/24 0417 New Bag at 06/13/24 0417     Scheduled Meds:   acetaminophen  1,000 mg Oral Q8H    docusate sodium  100 mg Oral Daily    piperacillin-tazobactam (Zosyn) IV (PEDS and ADULTS) (extended infusion is not appropriate)  4.5 g Intravenous Q8H     PRN Meds:  Current Facility-Administered Medications:     antihemophilic factor-vwf, 40 Units/kg, Intravenous, See admin instructions    bisacodyL, 10 mg, Rectal, Daily PRN    HYDROmorphone, 0.5 mg, Intravenous, Q3H PRN    naloxone, 0.02 mg, Intravenous, PRN    ondansetron, 4 mg, Intravenous, Q6H PRN    oxyCODONE, 5 mg, Oral, Q6H PRN    polyethylene glycol, 17 g, Oral, Daily PRN    sodium chloride 0.9%, 10 mL, Intravenous, Q12H PRN     Review of patient's allergies indicates:   Allergen Reactions    Flexeril [cyclobenzaprine] Hives and Swelling      Objective:     Vital Signs (Most Recent):  Temp: 98.6 °F (37 °C) (06/13/24 0431)  Pulse: (!) 55 (06/13/24 0431)  Resp: 18 (06/13/24 0504)  BP: (!) 124/58 (06/13/24 0431)  SpO2: 98 % (06/13/24 0431) Vital Signs (24h Range):  Temp:  [97.6 °F (36.4 °C)-98.6 °F (37 °C)] 98.6 °F (37 °C)  Pulse:  [55-76] 55  Resp:  [11-19] 18  SpO2:  [96 %-99 %] 98 %  BP: (114-135)/(58-73) 124/58     Weight: 68 kg (150 lb)  Body mass index is 19.79 kg/m².    Intake/Output - Last 3 Shifts         06/11 0700  06/12 0659 06/12 0700  06/13 0659 06/13 0700  06/14 0659    P.O.  480     IV Piggyback 1000 100     Total Intake(mL/kg) 1000 (14.7) 580 (8.5)     Urine (mL/kg/hr)  503 (0.3)     Total Output  503     Net +1000 +77            Urine Occurrence  3 x              Physical Exam  Constitutional:       General: He is not in acute distress.  HENT:      Head: Normocephalic and atraumatic.   Eyes:      Extraocular Movements: Extraocular movements intact.      Conjunctiva/sclera: Conjunctivae normal.      Pupils: Pupils are equal, round, and reactive to light.   Cardiovascular:      Rate and Rhythm: Normal rate and regular rhythm.   Pulmonary:      Effort: Pulmonary effort is normal. No respiratory distress.   Abdominal:      Palpations: Abdomen is soft.      Comments: RLQ tenderness with rebound and guarding    Neurological:      General: No focal deficit present.      Mental Status: He is alert.          Significant Labs:  I have reviewed all pertinent lab results within the past 24 hours.    Significant Diagnostics:  I have reviewed all pertinent imaging results/findings within the past 24 hours.  Assessment/Plan:     * Acute appendicitis  39 y.o. male with history of von Willebrand disease with history and workup consistent with acute appendicitis. Patient with von Willebrand disease and therefore higher surgical risk for bleeding. Patient does have an appendicolith on imaging which means just treatment with antibiotics would likely be  unsuccessful and patient will need surgical intervention. Will have patient evaluated by hematology for pre-operative planning and plan for laparoscopic appendectomy this admission.       - Hematology consulted . Humate dose ordered for today. Will keep post-op for repeat doses.   - NPO for surgery today.   - IV Zosyn  - IVF   - Rest of care per primary        Radhames Frederick MD  General Surgery  [facility] [place] - Telemetry

## 2024-06-13 NOTE — ASSESSMENT & PLAN NOTE
Onboard hema/onc recs:  Pt followed by Hematology at outside facility He has not received factor replacement in past   Labs at [facility] shows nl multimers, VW act 37% , vwAg 85, FV %   Plan loading dose with factor replacement ( humate) 40 units/kg 30 min prior to procedure  Followed by maintenance dosing approx 30 units/kg  for a few days post op starting on POD # 1  [facility] Hematology team have been advised of admission and will coordinate close follow up as outpatient  Monitor daily F 8 levels  vWprofile testing pending

## 2024-06-13 NOTE — OP NOTE
[facility]  General Surgery  Operative Note    DATE: 6/13/2024    PREOPERATIVE DIAGNOSIS: Acute appendicitis with localized peritonitis, without perforation, abscess, or gangrene [K35.30]     POSTOPERATIVE DIAGNOSIS: Acute appendicitis with localized peritonitis, without perforation, abscess, or gangrene [K35.30]     Procedure(s):  APPENDECTOMY, LAPAROSCOPIC     Surgeons and Role:     * Chris Brunner MD - Primary  Radhames Frederick MD    ANESTHESIA: General endotracheal anesthesia    FINDINGS: Acute appendicitis without perforation and surrounding inflammatory tissue.    INDICATION: Mr. Parrish is a 39 y.o. male who presented to [facility] with complaints of anorexia, generalized malaise, and lower abdominal pain. The history and exam were consistent with acute appendicitis, which was confirmed by laboratory studies and a CT scan. After discussing the findings with the patient, we recommended laparoscopic appendectomy for management. After discussing the alternatives, benefits, and risks for the proposed operation, Mr. Parrish wished to proceed. All of Mr. Parrish questions concerning the operation were answered, he expressed full understanding of the procedure and the risks/benefits associated, and signed informed consent was obtained.     PROCEDURE IN DETAIL: Mr. Parrish was brought to the operating room where he was placed in supine position on the operating room table and underwent general anesthesia with endotracheal intubation without complication. The field was sterilely prepped out and draped in standard fashion, and a timeout identifying the correct patient, placement, procedure, and preoperative antibiotics was called with everyone in agreement.  Using a #11 scalpel, an supra umbilical skin incision was made through the epidermis and dermis, and the incision was deepened through the subcutaneous tissue with electrocautery to the fascia. The fascia was  sharply incised. The preperitoneal  space was opened bluntly  to reveal the intraabdominal space. . A 12mm sam trochar was placed in the abdomen, and the abdomen was insufflated to 15-mm Hg without issue. The abdomen was inspected and no abnormalities were noted apart from inflamed tissue in the RLQ. Under direct vision, two additional 5-mm trocars were placed in the lower midline and left lower quadrant in standard fashion. The appendix was identified and noted to have inflammatory changes with surrounding inflammatory tissue, without evidence of perforation. Using an atraumatic grasper, the appendix was elevated. The harmonic scalpel was using to take surrounding attachments and the mesoappendix down to the base.  The appendix was controlled with PDS endo loop and divided with harmonic scalpel. The appendix was removed and sent to Pathology. The RLQ was hemastatic and the base was well controlled. . The two 5mm ports were removed without issue. The 12-mm port was removed and the fascia was closed with  0 Vicryl suture. Skin was then closed with subcuticular 4-0 vicryl and sterile dressing.   .   This completed the proposed operation. All instruments, needles, and sponge counts were reported as correct x2 by the nursing staff. Patient was extubated and awakened from general anesthesia without complication. He was sent to the recovery unit in stable condition.     Dr. Brunner present for all critical portions of the case.     EBL: 10mL    COMPLICATIONS: none apparent.    SPECIMEN: Appendix for permanent    DRAINS: None    DISPOSITION: PACU.    Radhames Frederick MD  Surgery, PGY-3

## 2024-06-13 NOTE — PLAN OF CARE
Inpatient Upgrade Note    Buddyguillaume Parrish has warranted treatment spanning two or more midnights of hospital level care for the management of  Acute Appendicitis . He continues to require IV antibiotics, further testing/imaging, further evaluation by consultants, and appendectomy, Humate dose and monitoring for bleeding . His condition is also complicated by the following comorbidities:  Von Williebrand Disease .

## 2024-06-13 NOTE — ASSESSMENT & PLAN NOTE
39 y.o. male with history of von Willebrand disease with history and workup consistent with acute appendicitis. Patient with von Willebrand disease and therefore higher surgical risk for bleeding. Patient does have an appendicolith on imaging which means just treatment with antibiotics would likely be unsuccessful and patient will need surgical intervention. Will have patient evaluated by hematology for pre-operative planning and plan for laparoscopic appendectomy this admission.       - Hematology consulted . Humate dose ordered for today. Will keep post-op for repeat doses.   - NPO for surgery today.   - IV Zosyn  - IVF   - Rest of care per primary

## 2024-06-13 NOTE — PLAN OF CARE
06/12/24 1147   Discharge Planning   Assessment Type Discharge Planning Brief Assessment   Resource/Environmental Concerns none   Support Systems Family members   Equipment Currently Used at Home none   Current Living Arrangements home   Patient/Family Anticipates Transition to home   Patient/Family Anticipated Services at Transition outpatient care   DME Needed Upon Discharge  none   Discharge Plan A Home   Discharge Plan B Home

## 2024-06-13 NOTE — PLAN OF CARE
Problem: Adult Inpatient Plan of Care  Goal: Plan of Care Review  Outcome: Progressing  Flowsheets (Taken 6/13/2024 3090)  Plan of Care Reviewed With: patient  Goal: Patient-Specific Goal (Individualized)  Outcome: Progressing  Goal: Absence of Hospital-Acquired Illness or Injury  Outcome: Progressing  Goal: Optimal Comfort and Wellbeing  Outcome: Progressing  Goal: Readiness for Transition of Care  Outcome: Progressing     Problem: Wound  Goal: Optimal Coping  Outcome: Progressing  Goal: Optimal Functional Ability  Outcome: Progressing  Goal: Absence of Infection Signs and Symptoms  Outcome: Progressing  Goal: Improved Oral Intake  Outcome: Progressing  Goal: Optimal Pain Control and Function  Outcome: Progressing  Goal: Skin Health and Integrity  Outcome: Progressing  Goal: Optimal Wound Healing  Outcome: Progressing

## 2024-06-14 LAB
ALBUMIN SERPL BCP-MCNC: 3.2 G/DL (ref 3.5–5.2)
ALP SERPL-CCNC: 66 U/L (ref 55–135)
ALT SERPL W/O P-5'-P-CCNC: 14 U/L (ref 10–44)
ANION GAP SERPL CALC-SCNC: 9 MMOL/L (ref 8–16)
AST SERPL-CCNC: 15 U/L (ref 10–40)
BILIRUB SERPL-MCNC: 0.6 MG/DL (ref 0.1–1)
BUN SERPL-MCNC: 10 MG/DL (ref 6–20)
CALCIUM SERPL-MCNC: 8.9 MG/DL (ref 8.7–10.5)
CHLORIDE SERPL-SCNC: 103 MMOL/L (ref 95–110)
CO2 SERPL-SCNC: 26 MMOL/L (ref 23–29)
CREAT SERPL-MCNC: 1 MG/DL (ref 0.5–1.4)
ERYTHROCYTE [DISTWIDTH] IN BLOOD BY AUTOMATED COUNT: 13.6 % (ref 11.5–14.5)
EST. GFR  (NO RACE VARIABLE): >60 ML/MIN/1.73 M^2
FACT VIII ACT/NOR PPP: 89 % (ref 55–200)
GLUCOSE SERPL-MCNC: 98 MG/DL (ref 70–110)
HCT VFR BLD AUTO: 39.4 % (ref 40–54)
HGB BLD-MCNC: 13.1 G/DL (ref 14–18)
MAGNESIUM SERPL-MCNC: 1.8 MG/DL (ref 1.6–2.6)
MCH RBC QN AUTO: 26.8 PG (ref 27–31)
MCHC RBC AUTO-ENTMCNC: 33.2 G/DL (ref 32–36)
MCV RBC AUTO: 81 FL (ref 82–98)
PHOSPHATE SERPL-MCNC: 3.2 MG/DL (ref 2.7–4.5)
PLATELET # BLD AUTO: 222 K/UL (ref 150–450)
PMV BLD AUTO: 11.9 FL (ref 9.2–12.9)
POTASSIUM SERPL-SCNC: 4.3 MMOL/L (ref 3.5–5.1)
PROT SERPL-MCNC: 6.6 G/DL (ref 6–8.4)
RBC # BLD AUTO: 4.88 M/UL (ref 4.6–6.2)
SODIUM SERPL-SCNC: 138 MMOL/L (ref 136–145)
VON WILLEBRAND EVAL PPP-IMP: NORMAL
VWF AG ACT/NOR PPP IA: 78 % (ref 55–200)
VWF:AC ACT/NOR PPP IA: 70 % (ref 55–200)
WBC # BLD AUTO: 6.31 K/UL (ref 3.9–12.7)

## 2024-06-14 PROCEDURE — 25000003 PHARM REV CODE 250

## 2024-06-14 PROCEDURE — 11000001 HC ACUTE MED/SURG PRIVATE ROOM

## 2024-06-14 PROCEDURE — 83735 ASSAY OF MAGNESIUM: CPT | Performed by: NURSE PRACTITIONER

## 2024-06-14 PROCEDURE — 80053 COMPREHEN METABOLIC PANEL: CPT | Performed by: NURSE PRACTITIONER

## 2024-06-14 PROCEDURE — 63600175 PHARM REV CODE 636 W HCPCS

## 2024-06-14 PROCEDURE — 63600175 PHARM REV CODE 636 W HCPCS: Performed by: HOSPITALIST

## 2024-06-14 PROCEDURE — 85397 CLOTTING FUNCT ACTIVITY: CPT | Performed by: INTERNAL MEDICINE

## 2024-06-14 PROCEDURE — 85027 COMPLETE CBC AUTOMATED: CPT | Performed by: NURSE PRACTITIONER

## 2024-06-14 PROCEDURE — 84100 ASSAY OF PHOSPHORUS: CPT | Performed by: NURSE PRACTITIONER

## 2024-06-14 PROCEDURE — 30233V1 TRANSFUSION OF NONAUTOLOGOUS ANTIHEMOPHILIC FACTORS INTO PERIPHERAL VEIN, PERCUTANEOUS APPROACH: ICD-10-PCS | Performed by: SURGERY

## 2024-06-14 PROCEDURE — 63600175 PHARM REV CODE 636 W HCPCS: Performed by: NURSE PRACTITIONER

## 2024-06-14 PROCEDURE — 63600531 PHARM REV CODE 636 NO ALT 250 W HCPCS: Mod: JZ,JG | Performed by: SURGERY

## 2024-06-14 RX ORDER — METHOCARBAMOL 500 MG/1
500 TABLET, FILM COATED ORAL 4 TIMES DAILY
Status: DISCONTINUED | OUTPATIENT
Start: 2024-06-14 | End: 2024-06-15 | Stop reason: HOSPADM

## 2024-06-14 RX ORDER — HYDROMORPHONE HYDROCHLORIDE 1 MG/ML
1.5 INJECTION, SOLUTION INTRAMUSCULAR; INTRAVENOUS; SUBCUTANEOUS
Status: DISCONTINUED | OUTPATIENT
Start: 2024-06-14 | End: 2024-06-15

## 2024-06-14 RX ORDER — POLYETHYLENE GLYCOL 3350 17 G/17G
17 POWDER, FOR SOLUTION ORAL DAILY
Status: DISCONTINUED | OUTPATIENT
Start: 2024-06-14 | End: 2024-06-15 | Stop reason: HOSPADM

## 2024-06-14 RX ORDER — HYDROMORPHONE HYDROCHLORIDE 1 MG/ML
1 INJECTION, SOLUTION INTRAMUSCULAR; INTRAVENOUS; SUBCUTANEOUS
Status: DISCONTINUED | OUTPATIENT
Start: 2024-06-14 | End: 2024-06-14

## 2024-06-14 RX ADMIN — ONDANSETRON 4 MG: 2 INJECTION INTRAMUSCULAR; INTRAVENOUS at 06:06

## 2024-06-14 RX ADMIN — ACETAMINOPHEN 1000 MG: 500 TABLET ORAL at 10:06

## 2024-06-14 RX ADMIN — ONDANSETRON 4 MG: 2 INJECTION INTRAMUSCULAR; INTRAVENOUS at 12:06

## 2024-06-14 RX ADMIN — HYDROMORPHONE HYDROCHLORIDE 1.5 MG: 1 INJECTION, SOLUTION INTRAMUSCULAR; INTRAVENOUS; SUBCUTANEOUS at 06:06

## 2024-06-14 RX ADMIN — HYDROMORPHONE HYDROCHLORIDE 0.5 MG: 1 INJECTION, SOLUTION INTRAMUSCULAR; INTRAVENOUS; SUBCUTANEOUS at 11:06

## 2024-06-14 RX ADMIN — ACETAMINOPHEN 1000 MG: 500 TABLET ORAL at 05:06

## 2024-06-14 RX ADMIN — HYDROMORPHONE HYDROCHLORIDE 1.5 MG: 1 INJECTION, SOLUTION INTRAMUSCULAR; INTRAVENOUS; SUBCUTANEOUS at 03:06

## 2024-06-14 RX ADMIN — HYDROMORPHONE HYDROCHLORIDE 0.5 MG: 1 INJECTION, SOLUTION INTRAMUSCULAR; INTRAVENOUS; SUBCUTANEOUS at 06:06

## 2024-06-14 RX ADMIN — ANTIHEMOPHILIC FACTOR/VON WILLEBRAND FACTOR COMPLEX (HUMAN) 2072 UNITS: KIT at 12:06

## 2024-06-14 RX ADMIN — METHOCARBAMOL 500 MG: 500 TABLET ORAL at 10:06

## 2024-06-14 RX ADMIN — ACETAMINOPHEN 1000 MG: 500 TABLET ORAL at 01:06

## 2024-06-14 RX ADMIN — HYDROMORPHONE HYDROCHLORIDE 0.5 MG: 1 INJECTION, SOLUTION INTRAMUSCULAR; INTRAVENOUS; SUBCUTANEOUS at 12:06

## 2024-06-14 RX ADMIN — METHOCARBAMOL 500 MG: 500 TABLET ORAL at 04:06

## 2024-06-14 RX ADMIN — METHOCARBAMOL 500 MG: 500 TABLET ORAL at 01:06

## 2024-06-14 RX ADMIN — HYDROMORPHONE HYDROCHLORIDE 1.5 MG: 1 INJECTION, SOLUTION INTRAMUSCULAR; INTRAVENOUS; SUBCUTANEOUS at 10:06

## 2024-06-14 NOTE — ANESTHESIA POSTPROCEDURE EVALUATION
Anesthesia Post Evaluation    Patient: Buddy Francois    Procedure(s) Performed: Procedure(s) (LRB):  APPENDECTOMY, LAPAROSCOPIC (N/A)    Final Anesthesia Type: general      Patient location during evaluation: PACU  Patient participation: Yes- Able to Participate  Level of consciousness: awake and alert, oriented and awake  Post-procedure vital signs: reviewed and stable  Airway patency: patent    PONV status at discharge: No PONV  Anesthetic complications: no      Cardiovascular status: blood pressure returned to baseline  Respiratory status: unassisted, spontaneous ventilation and room air  Hydration status: euvolemic  Follow-up not needed.              Vitals Value Taken Time   /72 06/14/24 0721   Temp 36.5 °C (97.7 °F) 06/14/24 0721   Pulse 55 06/14/24 0721   Resp 18 06/14/24 0721   SpO2 98 % 06/14/24 0721         Event Time   Out of Recovery 06/13/2024 15:45:00         Pain/Adriana Score: Pain Rating Prior to Med Admin: 9 (6/14/2024  6:13 AM)  Pain Rating Post Med Admin: 9 (6/14/2024  6:15 AM)  Adriana Score: 10 (6/13/2024  3:30 PM)

## 2024-06-14 NOTE — SUBJECTIVE & OBJECTIVE
Interval History: complaining of severe abdominal pain not allowing him to sleep.    Review of Systems   HENT:  Negative for ear discharge and ear pain.    Eyes:  Negative for discharge and itching.   Endocrine: Negative for cold intolerance and heat intolerance.   Neurological:  Negative for seizures and syncope.     Objective:     Vital Signs (Most Recent):  Temp: 98 °F (36.7 °C) (06/14/24 1057)  Pulse: 60 (06/14/24 1057)  Resp: 18 (06/14/24 1154)  BP: 117/72 (06/14/24 1057)  SpO2: 98 % (06/14/24 1057) Vital Signs (24h Range):  Temp:  [97.4 °F (36.3 °C)-98.5 °F (36.9 °C)] 98 °F (36.7 °C)  Pulse:  [55-70] 60  Resp:  [16-19] 18  SpO2:  [93 %-99 %] 98 %  BP: (114-133)/(59-80) 117/72     Weight: 68 kg (150 lb)  Body mass index is 19.79 kg/m².    Intake/Output Summary (Last 24 hours) at 6/14/2024 1439  Last data filed at 6/13/2024 2100  Gross per 24 hour   Intake 1580 ml   Output 1000 ml   Net 580 ml         Physical Exam  Constitutional:       Appearance: He is not ill-appearing or diaphoretic.   HENT:      Head: Normocephalic and atraumatic.      Mouth/Throat:      Mouth: Mucous membranes are dry.   Eyes:      Extraocular Movements: Extraocular movements intact.   Cardiovascular:      Rate and Rhythm: Normal rate and regular rhythm.   Pulmonary:      Effort: Pulmonary effort is normal.      Breath sounds: Normal breath sounds.   Abdominal:      Tenderness: There is abdominal tenderness. There is guarding.   Musculoskeletal:         General: Normal range of motion.      Cervical back: Normal range of motion.   Skin:     General: Skin is warm and dry.   Neurological:      Mental Status: He is alert and oriented to person, place, and time. Mental status is at baseline.   Psychiatric:         Mood and Affect: Mood normal.         Behavior: Behavior normal.         Thought Content: Thought content normal.         Judgment: Judgment normal.             Significant Labs: All pertinent labs within the past 24 hours have been  reviewed.  BMP:   Recent Labs   Lab 06/14/24  0353   GLU 98      K 4.3      CO2 26   BUN 10   CREATININE 1.0   CALCIUM 8.9   MG 1.8     CBC:   Recent Labs   Lab 06/13/24  0415 06/14/24  0353   WBC 5.18 6.31   HGB 13.5* 13.1*   HCT 41.7 39.4*    222       Significant Imaging: I have reviewed all pertinent imaging results/findings within the past 24 hours.

## 2024-06-14 NOTE — SUBJECTIVE & OBJECTIVE
Interval History: NAEO. No sign of bleeding. CBC stable. Labs stable. Will given more Humate today.     Medications:  Continuous Infusions:  Scheduled Meds:   acetaminophen  1,000 mg Oral Q8H    antihemophilic factor-vwf  2,072 Units Intravenous Q24H    docusate sodium  100 mg Oral Daily    polyethylene glycol  17 g Oral Daily     PRN Meds:  Current Facility-Administered Medications:     bisacodyL, 10 mg, Rectal, Daily PRN    HYDROmorphone, 0.5 mg, Intravenous, Q3H PRN    naloxone, 0.02 mg, Intravenous, PRN    ondansetron, 4 mg, Intravenous, Q6H PRN    oxyCODONE, 10 mg, Oral, Q4H PRN    oxyCODONE, 5 mg, Oral, Q4H PRN    sodium chloride 0.9%, 10 mL, Intravenous, Q12H PRN     Review of patient's allergies indicates:   Allergen Reactions    Flexeril [cyclobenzaprine] Hives and Swelling     Objective:     Vital Signs (Most Recent):  Temp: 97.4 °F (36.3 °C) (06/14/24 0413)  Pulse: (!) 56 (06/14/24 0413)  Resp: 18 (06/14/24 0613)  BP: 116/61 (06/14/24 0413)  SpO2: 98 % (06/14/24 0413) Vital Signs (24h Range):  Temp:  [97.4 °F (36.3 °C)-98.5 °F (36.9 °C)] 97.4 °F (36.3 °C)  Pulse:  [51-90] 56  Resp:  [16-41] 18  SpO2:  [91 %-100 %] 98 %  BP: (100-133)/(59-80) 116/61     Weight: 68 kg (150 lb)  Body mass index is 19.79 kg/m².    Intake/Output - Last 3 Shifts         06/12 0700  06/13 0659 06/13 0700 06/14 0659 06/14 0700  06/15 0659    P.O. 480 480     IV Piggyback 100 2200     Total Intake(mL/kg) 580 (8.5) 2680 (39.4)     Urine (mL/kg/hr) 503 (0.3) 1000 (0.6)     Total Output 503 1000     Net +77 +1680            Urine Occurrence 3 x               Physical Exam  Constitutional:       General: He is not in acute distress.  HENT:      Head: Normocephalic and atraumatic.   Eyes:      Extraocular Movements: Extraocular movements intact.      Conjunctiva/sclera: Conjunctivae normal.      Pupils: Pupils are equal, round, and reactive to light.   Cardiovascular:      Rate and Rhythm: Normal rate and regular rhythm.   Pulmonary:       Effort: Pulmonary effort is normal. No respiratory distress.   Abdominal:      Palpations: Abdomen is soft.      Comments: RLQ tenderness with rebound and guarding    Neurological:      General: No focal deficit present.      Mental Status: He is alert.          Significant Labs:  I have reviewed all pertinent lab results within the past 24 hours.    Significant Diagnostics:  I have reviewed all pertinent imaging results/findings within the past 24 hours.

## 2024-06-14 NOTE — PLAN OF CARE
Problem: Adult Inpatient Plan of Care  Goal: Plan of Care Review  Outcome: Progressing  Flowsheets (Taken 6/14/2024 2700)  Plan of Care Reviewed With: patient  Goal: Patient-Specific Goal (Individualized)  Outcome: Progressing  Goal: Absence of Hospital-Acquired Illness or Injury  Outcome: Progressing  Goal: Optimal Comfort and Wellbeing  Outcome: Progressing  Goal: Readiness for Transition of Care  Outcome: Progressing     Problem: Wound  Goal: Optimal Coping  Outcome: Progressing  Goal: Optimal Functional Ability  Outcome: Progressing  Goal: Absence of Infection Signs and Symptoms  Outcome: Progressing  Goal: Improved Oral Intake  Outcome: Progressing  Goal: Optimal Pain Control and Function  Outcome: Progressing  Goal: Skin Health and Integrity  Outcome: Progressing  Goal: Optimal Wound Healing  Outcome: Progressing

## 2024-06-14 NOTE — PLAN OF CARE
Plan for patient discharge home once medically clear. S/P lap appendectomy on 6/13 without complications. Pt continues with complaints of severe abdominal pain.  General surgery scheduled, listed on AVS.    06/14/24 5933   Discharge Reassessment   Assessment Type Discharge Planning Reassessment   Did the patient's condition or plan change since previous assessment? No   Communicated ALIS with patient/caregiver Date not available/Unable to determine   Discharge Plan A Home   Discharge Plan B Home   DME Needed Upon Discharge  none   Transition of Care Barriers None   Why the patient remains in the hospital Requires continued medical care   Post-Acute Status   Discharge Delays None known at this time

## 2024-06-14 NOTE — NURSING
[facility] [place]  Nurses Note -- 4 Eyes      6/14/2024       Skin assessed on: Q Shift      [x] No Pressure Injuries Present    [x]Prevention Measures Documented    [] Yes LDA  for Pressure Injury Previously documented     [] Yes New Pressure Injury Discovered   [] LDA for New Pressure Injury Added      Attending RN:  Raul Davis, RN     Second RN:  Nhung DOMINGUEZ RN

## 2024-06-14 NOTE — ASSESSMENT & PLAN NOTE
39 y.o. male with history of von Willebrand disease with history and workup consistent with acute appendicitis. Patient with von Willebrand disease and therefore higher surgical risk for bleeding. Patient does have an appendicolith on imaging which means just treatment with antibiotics would likely be unsuccessful and patient will need surgical intervention. Will have patient evaluated by hematology for pre-operative planning and plan for laparoscopic appendectomy this admission.       - Hematology consulted . Humate dose ordered pre-op and will continue today.   -CBC stable. CTM  -Regular diet   -Pain and nausea control   - IVF   - Rest of care per primary

## 2024-06-14 NOTE — PROGRESS NOTES
[facility] Medicine  Progress Note    Patient Name: Buddy Parrish  MRN: [id number]  Patient Class: IP- Inpatient   Admission Date: 6/12/2024  Length of Stay: 1 days  Attending Physician: Chris Brunner MD  Primary Care Provider: Jose Angel Ca MD        Subjective:     Principal Problem:Acute appendicitis        HPI:  39 year old male with history of Von Willebrand disease present with complaint of right lower quadrant abdominal pain. Starting around 7 am he began to have deepthi mild periumbilical discomfort.  States throughout the day pain has become more severe, now mostly to the right lower quadrant. this evening around 7-8 p.m. pain became more severe.  Pain has been constant throughout the day. He also complains of nausea without vomiting. Decreased appetite and intake since onset of symptoms.  Normal BMs.  Denies history of similar symptoms.  No fever chills myalgias.  No meds taken prior to arrival.  Pain has begun to involve his right-sided flank and right low back.  Denies history nephrolithiasis.         In the ED:   Labs: cbc and cmp unremarkable  CT abd/pelvis: 1. Findings in keeping with uncomplicated acute appendicitis. No evidence of perforation or drainable fluid collection/abscess formation at the present time.     Overview/Hospital Course:  39 year old admitted for acute appendicitis.  Patient with hx of von willebrand disease and Heme/Onc consulted.  Patient started on Humate.  Underwent lap appendectomy on 6/13 without complications.    Interval History: complaining of severe abdominal pain not allowing him to sleep.    Review of Systems   HENT:  Negative for ear discharge and ear pain.    Eyes:  Negative for discharge and itching.   Endocrine: Negative for cold intolerance and heat intolerance.   Neurological:  Negative for seizures and syncope.     Objective:     Vital Signs (Most Recent):  Temp: 98 °F (36.7 °C) (06/14/24 1057)  Pulse: 60 (06/14/24 1057)  Resp: 18  (06/14/24 1154)  BP: 117/72 (06/14/24 1057)  SpO2: 98 % (06/14/24 1057) Vital Signs (24h Range):  Temp:  [97.4 °F (36.3 °C)-98.5 °F (36.9 °C)] 98 °F (36.7 °C)  Pulse:  [55-70] 60  Resp:  [16-19] 18  SpO2:  [93 %-99 %] 98 %  BP: (114-133)/(59-80) 117/72     Weight: 68 kg (150 lb)  Body mass index is 19.79 kg/m².    Intake/Output Summary (Last 24 hours) at 6/14/2024 1439  Last data filed at 6/13/2024 2100  Gross per 24 hour   Intake 1580 ml   Output 1000 ml   Net 580 ml         Physical Exam  Constitutional:       Appearance: He is not ill-appearing or diaphoretic.   HENT:      Head: Normocephalic and atraumatic.      Mouth/Throat:      Mouth: Mucous membranes are dry.   Eyes:      Extraocular Movements: Extraocular movements intact.   Cardiovascular:      Rate and Rhythm: Normal rate and regular rhythm.   Pulmonary:      Effort: Pulmonary effort is normal.      Breath sounds: Normal breath sounds.   Abdominal:      Tenderness: There is abdominal tenderness. There is guarding.   Musculoskeletal:         General: Normal range of motion.      Cervical back: Normal range of motion.   Skin:     General: Skin is warm and dry.   Neurological:      Mental Status: He is alert and oriented to person, place, and time. Mental status is at baseline.   Psychiatric:         Mood and Affect: Mood normal.         Behavior: Behavior normal.         Thought Content: Thought content normal.         Judgment: Judgment normal.             Significant Labs: All pertinent labs within the past 24 hours have been reviewed.  BMP:   Recent Labs   Lab 06/14/24  0353   GLU 98      K 4.3      CO2 26   BUN 10   CREATININE 1.0   CALCIUM 8.9   MG 1.8     CBC:   Recent Labs   Lab 06/13/24  0415 06/14/24  0353   WBC 5.18 6.31   HGB 13.5* 13.1*   HCT 41.7 39.4*    222       Significant Imaging: I have reviewed all pertinent imaging results/findings within the past 24 hours.    Assessment/Plan:      * Acute appendicitis  -consulted  general surgery  S/p lap appendectomy on 6/13.  Still having severe pain.  Pain control.  Diet advanced.      Von Willebrand disease  Onboard hema/onc recs:  Pt followed by Hematology at outside facility He has not received factor replacement in past   Labs at [facility] shows nl multimers, VW act 37% , vwAg 85, FV %   Plan loading dose with factor replacement ( humate) 40 units/kg 30 min prior to procedure  Followed by maintenance dosing approx 30 units/kg  for a few days post op starting on POD # 1  [facility] Hematology team have been advised of admission and will coordinate close follow up as outpatient  Monitor daily F 8 levels        VTE Risk Mitigation (From admission, onward)           Ordered     IP VTE LOW RISK PATIENT  Once         06/12/24 0837     Place sequential compression device  Until discontinued         06/12/24 0837                    Discharge Planning   ALIS:      Code Status: Full Code   Is the patient medically ready for discharge?:     Reason for patient still in hospital (select all that apply): Patient trending condition  Discharge Plan A: Home                  Oral Montgomery MD  Department of Hospital Medicine   [facility]

## 2024-06-14 NOTE — ASSESSMENT & PLAN NOTE
Onboard hema/onc recs:  Pt followed by Hematology at outside facility He has not received factor replacement in past   Labs at [facility] shows nl multimers, VW act 37% , vwAg 85, FV %   Plan loading dose with factor replacement ( humate) 40 units/kg 30 min prior to procedure  Followed by maintenance dosing approx 30 units/kg  for a few days post op starting on POD # 1  [facility] Hematology team have been advised of admission and will coordinate close follow up as outpatient  Monitor daily F 8 levels

## 2024-06-14 NOTE — ASSESSMENT & PLAN NOTE
-consulted general surgery  S/p lap appendectomy on 6/13.  Still having severe pain.  Pain control.  Diet advanced.

## 2024-06-14 NOTE — PROGRESS NOTES
[facility]  General Surgery  Progress Note    Subjective:     History of Present Illness:  Patient is a 39 y.o. male with history of von Willebrand disease who General Surgery was consulted for evaluation of acute appendicitis. Patient states that he started having periumbilical discomfort yesterday morning that progressed throughout the day. Became more severe and migrated to the RLQ. Associated with nausea without emesis. AFVSS on arrival to the ED. Labs largely wnl and without leukocytosis. CT was performed which revealed findings consistent with acute appendicitis as well as an intraluminal appendicolith. Patient denies history of abdominal surgery. No history of MI/stroke. Has von Willebrand disease and significant bleeding post-op from lipoma removal in 2022. Only takes B12 supplementation.       Post-Op Info:  Procedure(s) (LRB):  APPENDECTOMY, LAPAROSCOPIC (N/A)   1 Day Post-Op     Interval History: NAEO. No sign of bleeding. CBC stable. Labs stable. Will given more Humate today.     Medications:  Continuous Infusions:  Scheduled Meds:   acetaminophen  1,000 mg Oral Q8H    antihemophilic factor-vwf  2,072 Units Intravenous Q24H    docusate sodium  100 mg Oral Daily    polyethylene glycol  17 g Oral Daily     PRN Meds:  Current Facility-Administered Medications:     bisacodyL, 10 mg, Rectal, Daily PRN    HYDROmorphone, 0.5 mg, Intravenous, Q3H PRN    naloxone, 0.02 mg, Intravenous, PRN    ondansetron, 4 mg, Intravenous, Q6H PRN    oxyCODONE, 10 mg, Oral, Q4H PRN    oxyCODONE, 5 mg, Oral, Q4H PRN    sodium chloride 0.9%, 10 mL, Intravenous, Q12H PRN     Review of patient's allergies indicates:   Allergen Reactions    Flexeril [cyclobenzaprine] Hives and Swelling     Objective:     Vital Signs (Most Recent):  Temp: 97.4 °F (36.3 °C) (06/14/24 0413)  Pulse: (!) 56 (06/14/24 0413)  Resp: 18 (06/14/24 0613)  BP: 116/61 (06/14/24 0413)  SpO2: 98 % (06/14/24 0413) Vital Signs (24h Range):  Temp:  [97.4 °F  (36.3 °C)-98.5 °F (36.9 °C)] 97.4 °F (36.3 °C)  Pulse:  [51-90] 56  Resp:  [16-41] 18  SpO2:  [91 %-100 %] 98 %  BP: (100-133)/(59-80) 116/61     Weight: 68 kg (150 lb)  Body mass index is 19.79 kg/m².    Intake/Output - Last 3 Shifts         06/12 0700  06/13 0659 06/13 0700  06/14 0659 06/14 0700  06/15 0659    P.O. 480 480     IV Piggyback 100 2200     Total Intake(mL/kg) 580 (8.5) 2680 (39.4)     Urine (mL/kg/hr) 503 (0.3) 1000 (0.6)     Total Output 503 1000     Net +77 +1680            Urine Occurrence 3 x               Physical Exam  Constitutional:       General: He is not in acute distress.  HENT:      Head: Normocephalic and atraumatic.   Eyes:      Extraocular Movements: Extraocular movements intact.      Conjunctiva/sclera: Conjunctivae normal.      Pupils: Pupils are equal, round, and reactive to light.   Cardiovascular:      Rate and Rhythm: Normal rate and regular rhythm.   Pulmonary:      Effort: Pulmonary effort is normal. No respiratory distress.   Abdominal:      Palpations: Abdomen is soft.      Comments: RLQ tenderness with rebound and guarding    Neurological:      General: No focal deficit present.      Mental Status: He is alert.          Significant Labs:  I have reviewed all pertinent lab results within the past 24 hours.    Significant Diagnostics:  I have reviewed all pertinent imaging results/findings within the past 24 hours.  Assessment/Plan:     * Acute appendicitis  39 y.o. male with history of von Willebrand disease with history and workup consistent with acute appendicitis. Patient with von Willebrand disease and therefore higher surgical risk for bleeding. Patient does have an appendicolith on imaging which means just treatment with antibiotics would likely be unsuccessful and patient will need surgical intervention. Will have patient evaluated by hematology for pre-operative planning and plan for laparoscopic appendectomy this admission.       - Hematology consulted . Humate dose  ordered pre-op and will continue today.   -CBC stable. CTM  -Regular diet   -Pain and nausea control   - IVF   - Rest of care per primary        Radhames Frederick MD  General Surgery  [facility] - Telemetry

## 2024-06-15 VITALS
BODY MASS INDEX: 19.88 KG/M2 | OXYGEN SATURATION: 96 % | DIASTOLIC BLOOD PRESSURE: 75 MMHG | HEIGHT: 73 IN | RESPIRATION RATE: 18 BRPM | SYSTOLIC BLOOD PRESSURE: 128 MMHG | WEIGHT: 150 LBS | HEART RATE: 80 BPM | TEMPERATURE: 99 F

## 2024-06-15 LAB
ERYTHROCYTE [DISTWIDTH] IN BLOOD BY AUTOMATED COUNT: 13.5 % (ref 11.5–14.5)
HCT VFR BLD AUTO: 37.3 % (ref 40–54)
HGB BLD-MCNC: 12.4 G/DL (ref 14–18)
MCH RBC QN AUTO: 27.3 PG (ref 27–31)
MCHC RBC AUTO-ENTMCNC: 33.2 G/DL (ref 32–36)
MCV RBC AUTO: 82 FL (ref 82–98)
PLATELET # BLD AUTO: 204 K/UL (ref 150–450)
PMV BLD AUTO: 11.2 FL (ref 9.2–12.9)
RBC # BLD AUTO: 4.55 M/UL (ref 4.6–6.2)
WBC # BLD AUTO: 4.58 K/UL (ref 3.9–12.7)

## 2024-06-15 PROCEDURE — 63600531 PHARM REV CODE 636 NO ALT 250 W HCPCS: Mod: JZ,JG | Performed by: HOSPITALIST

## 2024-06-15 PROCEDURE — 25000003 PHARM REV CODE 250

## 2024-06-15 PROCEDURE — 85027 COMPLETE CBC AUTOMATED: CPT

## 2024-06-15 PROCEDURE — 94799 UNLISTED PULMONARY SVC/PX: CPT

## 2024-06-15 PROCEDURE — 94761 N-INVAS EAR/PLS OXIMETRY MLT: CPT

## 2024-06-15 PROCEDURE — 36415 COLL VENOUS BLD VENIPUNCTURE: CPT

## 2024-06-15 PROCEDURE — 85397 CLOTTING FUNCT ACTIVITY: CPT | Performed by: INTERNAL MEDICINE

## 2024-06-15 PROCEDURE — 99900035 HC TECH TIME PER 15 MIN (STAT)

## 2024-06-15 RX ORDER — OXYCODONE AND ACETAMINOPHEN 7.5; 325 MG/1; MG/1
1 TABLET ORAL EVERY 6 HOURS PRN
Qty: 28 TABLET | Refills: 0 | Status: SHIPPED | OUTPATIENT
Start: 2024-06-15

## 2024-06-15 RX ORDER — DOCUSATE SODIUM 100 MG/1
100 CAPSULE, LIQUID FILLED ORAL DAILY
Qty: 30 CAPSULE | Refills: 0 | Status: SHIPPED | OUTPATIENT
Start: 2024-06-16

## 2024-06-15 RX ORDER — ONDANSETRON 4 MG/1
4 TABLET, FILM COATED ORAL EVERY 6 HOURS PRN
Qty: 30 TABLET | Refills: 0 | Status: SHIPPED | OUTPATIENT
Start: 2024-06-15

## 2024-06-15 RX ORDER — HYDROMORPHONE HYDROCHLORIDE 1 MG/ML
1.5 INJECTION, SOLUTION INTRAMUSCULAR; INTRAVENOUS; SUBCUTANEOUS EVERY 4 HOURS PRN
Status: DISCONTINUED | OUTPATIENT
Start: 2024-06-15 | End: 2024-06-15 | Stop reason: HOSPADM

## 2024-06-15 RX ORDER — POLYETHYLENE GLYCOL 3350 17 G/17G
17 POWDER, FOR SOLUTION ORAL DAILY PRN
Qty: 30 EACH | Refills: 0 | Status: SHIPPED | OUTPATIENT
Start: 2024-06-15

## 2024-06-15 RX ADMIN — ANTIHEMOPHILIC FACTOR/VON WILLEBRAND FACTOR COMPLEX (HUMAN) 2072 UNITS: KIT at 11:06

## 2024-06-15 RX ADMIN — METHOCARBAMOL 500 MG: 500 TABLET ORAL at 01:06

## 2024-06-15 RX ADMIN — OXYCODONE 10 MG: 5 TABLET ORAL at 09:06

## 2024-06-15 RX ADMIN — METHOCARBAMOL 500 MG: 500 TABLET ORAL at 09:06

## 2024-06-15 RX ADMIN — ACETAMINOPHEN 1000 MG: 500 TABLET ORAL at 06:06

## 2024-06-15 NOTE — SUBJECTIVE & OBJECTIVE
Interval History: NAEO. Tolerating diet. Still having pain requiring IV medication. CBC slightly down but stable. Vitals normal.     Medications:  Continuous Infusions:  Scheduled Meds:   acetaminophen  1,000 mg Oral Q8H    antihemophilic factor-vwf  2,072 Units Intravenous Q24H    docusate sodium  100 mg Oral Daily    methocarbamoL  500 mg Oral QID    polyethylene glycol  17 g Oral Daily     PRN Meds:  Current Facility-Administered Medications:     bisacodyL, 10 mg, Rectal, Daily PRN    HYDROmorphone, 1.5 mg, Intravenous, Q3H PRN    naloxone, 0.02 mg, Intravenous, PRN    ondansetron, 4 mg, Intravenous, Q6H PRN    oxyCODONE, 10 mg, Oral, Q4H PRN    oxyCODONE, 5 mg, Oral, Q4H PRN    sodium chloride 0.9%, 10 mL, Intravenous, Q12H PRN     Review of patient's allergies indicates:   Allergen Reactions    Flexeril [cyclobenzaprine] Hives and Swelling     Objective:     Vital Signs (Most Recent):  Temp: 97.7 °F (36.5 °C) (06/15/24 0409)  Pulse: 62 (06/15/24 0409)  Resp: 19 (06/15/24 0409)  BP: 129/60 (06/15/24 0409)  SpO2: 99 % (06/15/24 0409) Vital Signs (24h Range):  Temp:  [97.4 °F (36.3 °C)-98.6 °F (37 °C)] 97.7 °F (36.5 °C)  Pulse:  [56-62] 62  Resp:  [18-19] 19  SpO2:  [98 %-100 %] 99 %  BP: (117-141)/(60-87) 129/60     Weight: 68 kg (150 lb)  Body mass index is 19.79 kg/m².    Intake/Output - Last 3 Shifts         06/13 0700  06/14 0659 06/14 0700  06/15 0659 06/15 0700  06/16 0659    P.O. 480 240     IV Piggyback 2200      Total Intake(mL/kg) 2680 (39.4) 240 (3.5)     Urine (mL/kg/hr) 1000 (0.6)      Total Output 1000      Net +1680 +240            Urine Occurrence  3 x     Stool Occurrence  1 x              Physical Exam  Constitutional:       General: He is not in acute distress.  HENT:      Head: Normocephalic and atraumatic.   Eyes:      Extraocular Movements: Extraocular movements intact.      Conjunctiva/sclera: Conjunctivae normal.      Pupils: Pupils are equal, round, and reactive to light.    Cardiovascular:      Rate and Rhythm: Normal rate and regular rhythm.   Pulmonary:      Effort: Pulmonary effort is normal. No respiratory distress.   Abdominal:      Palpations: Abdomen is soft.      Comments: Incisions looks good.   aTTP  No distention    Neurological:      General: No focal deficit present.      Mental Status: He is alert.          Significant Labs:  I have reviewed all pertinent lab results within the past 24 hours.    Significant Diagnostics:  I have reviewed all pertinent imaging results/findings within the past 24 hours.

## 2024-06-15 NOTE — DISCHARGE SUMMARY
[facility] Medicine  Discharge Summary      Patient Name: Buddy Parrish  MRN: [id number]  [facility]: [id number]  Patient Class: IP- Inpatient  Admission Date: 6/12/2024  Hospital Length of Stay: 2 days  Discharge Date and Time: No discharge date for patient encounter.  Attending Physician: Guerita Louise MD   Discharging Provider: Guerita Louise MD  Primary Care Provider: Jose Angel Ca MD    Primary Care Team: Networked reference to record PCT     HPI:   39 year old male with history of Von Willebrand disease present with complaint of right lower quadrant abdominal pain. Starting around 7 am he began to have deepthi mild periumbilical discomfort.  States throughout the day pain has become more severe, now mostly to the right lower quadrant. this evening around 7-8 p.m. pain became more severe.  Pain has been constant throughout the day. He also complains of nausea without vomiting. Decreased appetite and intake since onset of symptoms.  Normal BMs.  Denies history of similar symptoms.  No fever chills myalgias.  No meds taken prior to arrival.  Pain has begun to involve his right-sided flank and right low back.  Denies history nephrolithiasis.         In the ED:   Labs: cbc and cmp unremarkable  CT abd/pelvis: 1. Findings in keeping with uncomplicated acute appendicitis. No evidence of perforation or drainable fluid collection/abscess formation at the present time.     Procedure(s) (LRB):  APPENDECTOMY, LAPAROSCOPIC (N/A)      Goals of Care Treatment Preferences:  Code Status: Full Code      Consults:   Consults (From admission, onward)          Status Ordering Provider     Inpatient consult to Social Work  Once        Provider:  (Not yet assigned)    Completed GUERITA LOUISE     Inpatient consult to Hematology/Oncology - [facility]  Once        Provider:  Nevaeh Lacey MD    Completed SILVER KHAN     Inpatient consult to General Surgery  Once        Provider:  Mega Pantoja MD     Completed ALVARO TY [facility] Course By Problem:   * Acute appendicitis  -Admitted to inpatient status  -General surgery consulted and patient was taken for lap appendectomy on 6/13.  He had significant post-operative pain  -He was seen and examined today and is doing well and eager to go home.  He is tolerating diet and having bowel movements.  Cleared for discharge by surgical team after receiving today's dose of Humate.  -Post-surgical precautions given to patient (no heavy lifting, ok to shower but no baths/pools/hottubs)  -Follow up with surgeons within 1-2 weeks      Von Willebrand disease  -History noted  -Heme/onc consulted and input appreciated  -Pt followed by Hematology at outside facility and had not received factor replacement in past   -Labs at [facility] shows nl multimers, VW act 37% , vwAg 85, FV %   -Treated with loading dose with factor replacement (humate) 40 units/kg 30 min prior to procedure and followed with maintenance dosing approx 30 units/kg/day.  Received dose again this morning prior to discharge.  Hb appears stable at 12.4 without kit bleeding.    -Needs ongoing outpatient follow up with [facility] Hematology        Final Active Diagnoses:    Diagnosis Date Noted POA    PRINCIPAL PROBLEM:  Acute appendicitis [K35.80] 06/12/2024 Yes    Von Willebrand disease [D68.00] 06/12/2024 Yes      Problems Resolved During this Admission:       Discharged Condition: stable    Disposition: Home or Self Care    Follow Up:   Follow-up Information       Chris Brunner MD. Schedule an appointment as soon as possible for a visit in 1 week(s).    Specialties: General Surgery, Surgery  Why: [facility] Follow-up needed in 1 week.  Contact information:  [address]  [address] [place] [id number] [phone]                           Patient Instructions:      Diet Adult Regular     Notify your health care provider if you experience any of the following:  increased  "confusion or weakness     Notify your health care provider if you experience any of the following:  persistent dizziness, light-headedness, or visual disturbances     Notify your health care provider if you experience any of the following:  worsening rash     Notify your health care provider if you experience any of the following:  severe persistent headache     Notify your health care provider if you experience any of the following:  difficulty breathing or increased cough     Notify your health care provider if you experience any of the following:  severe uncontrolled pain     Notify your health care provider if you experience any of the following:  persistent nausea and vomiting or diarrhea     Notify your health care provider if you experience any of the following:  temperature >100.4     Activity as tolerated       Significant Diagnostic Studies: Labs: BMP:   Recent Labs   Lab 06/14/24  0353   GLU 98      K 4.3      CO2 26   BUN 10   CREATININE 1.0   CALCIUM 8.9   MG 1.8   , CMP   Recent Labs   Lab 06/14/24  0353      K 4.3      CO2 26   GLU 98   BUN 10   CREATININE 1.0   CALCIUM 8.9   PROT 6.6   ALBUMIN 3.2*   BILITOT 0.6   ALKPHOS 66   AST 15   ALT 14   ANIONGAP 9   , CBC   Recent Labs   Lab 06/14/24  0353 06/15/24  0511   WBC 6.31 4.58   HGB 13.1* 12.4*   HCT 39.4* 37.3*    204   , INR   Lab Results   Component Value Date    INR 1.1 06/12/2024    INR 1.1 07/11/2020    INR 1.1 01/31/2011   , Lipid Panel No results found for: "CHOL", "HDL", "LDLCALC", "TRIG", "CHOLHDL", Troponin No results for input(s): "TROPONINI" in the last 168 hours., and A1C: No results for input(s): "HGBA1C" in the last 4320 hours.    Pending Diagnostic Studies:       Procedure Component Value Units Date/Time    Specimen to Pathology, Surgery General Surgery [[id number]] Collected: 06/13/24 1335    Order Status: Sent Lab Status: In process Updated: 06/14/24 2722    Specimen: Tissue     von Willebrand Factor " Activity, Plasma [[id number]] Collected: 06/15/24 0511    Order Status: Sent Lab Status: In process Updated: 06/15/24 0537    Specimen: Blood     von Willebrand Factor Activity, Plasma [[id number]] Collected: 06/14/24 0353    Order Status: Sent Lab Status: In process Updated: 06/14/24 0602    Specimen: Blood     von Willebrand Factor Activity, Plasma [[id number]] Collected: 06/13/24 1428    Order Status: Sent Lab Status: In process Updated: 06/13/24 1433    Specimen: Blood            Medications:  Reconciled Home Medications:      Medication List        START taking these medications      docusate sodium 100 MG capsule  Commonly known as: COLACE  Take 1 capsule (100 mg total) by mouth once daily.  Start taking on: June 16, 2024     ondansetron 4 MG tablet  Commonly known as: ZOFRAN  Take 1 tablet (4 mg total) by mouth every 6 (six) hours as needed for Nausea.     oxyCODONE-acetaminophen 7.5-325 mg per tablet  Commonly known as: PERCOCET  Take 1 tablet by mouth every 6 (six) hours as needed for Pain.     polyethylene glycol 17 gram Pwpk  Commonly known as: GLYCOLAX  Take 17 g by mouth daily as needed for Constipation.            CONTINUE taking these medications      VITAMIN B-12 50 mcg tablet  Generic drug: cyanocobalamin (vitamin B-12)  Take 50 mcg by mouth once daily.              Indwelling Lines/Drains at time of discharge:   Lines/Drains/Airways       None                   Time spent on the discharge of patient: 35 minutes         Miki Louise MD  Department of [facility] Medicine  [facility] [place] [facility]

## 2024-06-15 NOTE — DISCHARGE INSTRUCTIONS
Take all medications as prescribed.  Eat a strict regular diet  Follow up with your physicians as scheduled - General surgeon within 1 week and hematologist as they recommend.  Thank you for trusting [facility] and Dr. Louise with your care.  We are honored that you entrusted us with your healthcare needs. Your satisfaction is very important to us and we hope you have been very pleased with your experience at [facility]. After your discharge you may receive a survey asking you to rate your hospital experience. We encourage you to take the time to complete the survey as your feedback allows us to identify areas for improvement as well as recognize our staff.   We hope that you have received the very best care possible during your hospitalization at [facility], as your satisfaction is our top priority.

## 2024-06-15 NOTE — CONSULTS
Patient to discharge home with follow-ups. See full discharge assessment for more comprehensive patient information.

## 2024-06-15 NOTE — PLAN OF CARE
Case Management Final Discharge Note  Discharge Disposition: Home; follow-up  New DME ordered/Company Name: None  Relevant SDOH/Transition of Care Barriers: None  Primary Caretaker and Contact Information: Buddy: [phone]   Scheduled Follow-Up Appointment: Surgery Follow-up; message sent  Referrals Placed: None  Transportation: Family    Patient and family educated on discharge services and updated on DC plan. Bedside RN notified, patient clear to discharge from Case Management Perspective.

## 2024-06-15 NOTE — NURSING
[facility], [facility]  Nurses Note -- 4 Eyes      6/15/2024       Skin assessed on: Q Shift      [x] No Pressure Injuries Present    []Prevention Measures Documented    [] Yes LDA  for Pressure Injury Previously documented     [] Yes New Pressure Injury Discovered   [] LDA for New Pressure Injury Added      Attending RN:  Chacha Schwartz RN     Second RN:  RAJAN Jean

## 2024-06-15 NOTE — PLAN OF CARE
06/15/24 1041   Post-Acute Status   Post-Acute Authorization Other  (Home; surgery follow-up)   Hospital Resources/Appts/Education Provided Provided patient/caregiver with written discharge plan information;Appointments scheduled and added to AVS;Provided education on problems/symptoms using teachback;Post-Acute resouces added to AVS   Discharge Delays None known at this time   Discharge Plan   Discharge Plan A Home with family  (Surgery follow-up)   Discharge Plan B Home with family  (Surgery follow-up)

## 2024-06-15 NOTE — PLAN OF CARE
Problem: Adult Inpatient Plan of Care  Goal: Plan of Care Review  Outcome: Met  Goal: Patient-Specific Goal (Individualized)  Outcome: Met  Goal: Absence of Hospital-Acquired Illness or Injury  Outcome: Met  Goal: Optimal Comfort and Wellbeing  6/15/2024 1246 by Chacha Schwartz RN  Outcome: Met  6/15/2024 1246 by Chacha Schwartz RN  Outcome: Progressing  Goal: Readiness for Transition of Care  6/15/2024 1246 by Chacha Schwartz RN  Outcome: Met  6/15/2024 1246 by Chacha Schwartz RN  Outcome: Progressing     Problem: Wound  Goal: Optimal Coping  6/15/2024 1246 by Chacha Schwartz RN  Outcome: Met  6/15/2024 1246 by Chacha Schwartz RN  Outcome: Progressing  Goal: Optimal Functional Ability  6/15/2024 1246 by Chacha Schwartz RN  Outcome: Met  6/15/2024 1246 by Chacha Schwartz RN  Outcome: Progressing  Goal: Absence of Infection Signs and Symptoms  6/15/2024 1246 by Chacha Schwartz RN  Outcome: Met  6/15/2024 1246 by Chacha Schwartz RN  Outcome: Progressing  Goal: Improved Oral Intake  6/15/2024 1246 by Chacha Schwartz RN  Outcome: Met  6/15/2024 1246 by Chacha Schawrtz RN  Outcome: Progressing  Goal: Optimal Pain Control and Function  6/15/2024 1246 by Chacha Schwartz RN  Outcome: Met  6/15/2024 1246 by Chacha Schwartz RN  Outcome: Progressing  Goal: Skin Health and Integrity  6/15/2024 1246 by Chacha Schwartz RN  Outcome: Met  6/15/2024 1246 by Chacha Schwartz RN  Outcome: Progressing  Goal: Optimal Wound Healing  6/15/2024 1246 by Chacha Schwartz RN  Outcome: Met  6/15/2024 1246 by Chacha Schwartz RN  Outcome: Progressing

## 2024-06-15 NOTE — PROGRESS NOTES
[facility], [facility] [place]  Nurses Note -- 4 Eyes      6/14/2024       Skin assessed on: Q Shift      [x] No Pressure Injuries Present    [x]Prevention Measures Documented    [] Yes LDA  for Pressure Injury Previously documented     [] Yes New Pressure Injury Discovered   [] LDA for New Pressure Injury Added      Attending RN:  Miranda James RN     Second RN:   Raul Davsi RN

## 2024-06-15 NOTE — ASSESSMENT & PLAN NOTE
39 y.o. male with history of von Willebrand disease with history and workup consistent with acute appendicitis. Patient with von Willebrand disease and therefore higher surgical risk for bleeding. Patient does have an appendicolith on imaging which means just treatment with antibiotics would likely be unsuccessful and patient will need surgical intervention. Will have patient evaluated by hematology for pre-operative planning and plan for laparoscopic appendectomy this admission. He is now s/p lap appendectomy.       - Hematology consulted . Humate dose ordered pre-op and will continue again today.   -CBC stable. CTM  -Regular diet   -Pain and nausea control       Dispo: Discussed with pt if he feels up to going home later that would be okay after he gets Humate. If he has any issue we can monitor one more night and give 1 more dose of humate tomorrow.

## 2024-06-15 NOTE — PROGRESS NOTES
[facility] [place]  General Surgery  Progress Note    Subjective:     History of Present Illness:  Patient is a 39 y.o. male with history of von Willebrand disease who General Surgery was consulted for evaluation of acute appendicitis. Patient states that he started having periumbilical discomfort yesterday morning that progressed throughout the day. Became more severe and migrated to the RLQ. Associated with nausea without emesis. AFVSS on arrival to the ED. Labs largely wnl and without leukocytosis. CT was performed which revealed findings consistent with acute appendicitis as well as an intraluminal appendicolith. Patient denies history of abdominal surgery. No history of MI/stroke. Has von Willebrand disease and significant bleeding post-op from lipoma removal in 2022. Only takes B12 supplementation.       Post-Op Info:  Procedure(s) (LRB):  APPENDECTOMY, LAPAROSCOPIC (N/A)   2 Days Post-Op     Interval History: NAEO. Tolerating diet. Still having pain requiring IV medication. CBC slightly down but stable. Vitals normal.     Medications:  Continuous Infusions:  Scheduled Meds:   acetaminophen  1,000 mg Oral Q8H    antihemophilic factor-vwf  2,072 Units Intravenous Q24H    docusate sodium  100 mg Oral Daily    methocarbamoL  500 mg Oral QID    polyethylene glycol  17 g Oral Daily     PRN Meds:  Current Facility-Administered Medications:     bisacodyL, 10 mg, Rectal, Daily PRN    HYDROmorphone, 1.5 mg, Intravenous, Q3H PRN    naloxone, 0.02 mg, Intravenous, PRN    ondansetron, 4 mg, Intravenous, Q6H PRN    oxyCODONE, 10 mg, Oral, Q4H PRN    oxyCODONE, 5 mg, Oral, Q4H PRN    sodium chloride 0.9%, 10 mL, Intravenous, Q12H PRN     Review of patient's allergies indicates:   Allergen Reactions    Flexeril [cyclobenzaprine] Hives and Swelling     Objective:     Vital Signs (Most Recent):  Temp: 97.7 °F (36.5 °C) (06/15/24 0409)  Pulse: 62 (06/15/24 0409)  Resp: 19 (06/15/24 0409)  BP: 129/60 (06/15/24 0409)  SpO2: 99  % (06/15/24 0409) Vital Signs (24h Range):  Temp:  [97.4 °F (36.3 °C)-98.6 °F (37 °C)] 97.7 °F (36.5 °C)  Pulse:  [56-62] 62  Resp:  [18-19] 19  SpO2:  [98 %-100 %] 99 %  BP: (117-141)/(60-87) 129/60     Weight: 68 kg (150 lb)  Body mass index is 19.79 kg/m².    Intake/Output - Last 3 Shifts         06/13 0700  06/14 0659 06/14 0700  06/15 0659 06/15 0700  06/16 0659    P.O. 480 240     IV Piggyback 2200      Total Intake(mL/kg) 2680 (39.4) 240 (3.5)     Urine (mL/kg/hr) 1000 (0.6)      Total Output 1000      Net +1680 +240            Urine Occurrence  3 x     Stool Occurrence  1 x              Physical Exam  Constitutional:       General: He is not in acute distress.  HENT:      Head: Normocephalic and atraumatic.   Eyes:      Extraocular Movements: Extraocular movements intact.      Conjunctiva/sclera: Conjunctivae normal.      Pupils: Pupils are equal, round, and reactive to light.   Cardiovascular:      Rate and Rhythm: Normal rate and regular rhythm.   Pulmonary:      Effort: Pulmonary effort is normal. No respiratory distress.   Abdominal:      Palpations: Abdomen is soft.      Comments: Incisions looks good.   aTTP  No distention    Neurological:      General: No focal deficit present.      Mental Status: He is alert.          Significant Labs:  I have reviewed all pertinent lab results within the past 24 hours.    Significant Diagnostics:  I have reviewed all pertinent imaging results/findings within the past 24 hours.  Assessment/Plan:     * Acute appendicitis  39 y.o. male with history of von Willebrand disease with history and workup consistent with acute appendicitis. Patient with von Willebrand disease and therefore higher surgical risk for bleeding. Patient does have an appendicolith on imaging which means just treatment with antibiotics would likely be unsuccessful and patient will need surgical intervention. Will have patient evaluated by hematology for pre-operative planning and plan for  laparoscopic appendectomy this admission. He is now s/p lap appendectomy.       - Hematology consulted . Humate dose ordered pre-op and will continue again today.   -CBC stable. CTM  -Regular diet   -Pain and nausea control       Dispo: Discussed with pt if he feels up to going home later that would be okay after he gets Humate. If he has any issue we can monitor one more night and give 1 more dose of humate tomorrow.         Radhames Frederick MD  General Surgery  [facility] - Telemetry

## 2024-06-15 NOTE — PLAN OF CARE
Patient is ready and clear for discharge from Case Management's perspective; informed patient's nurse, Jack. Discussed and provided patient with written discharge instruction and education.

## 2024-06-15 NOTE — NURSING
Received report from RAJAN Jean. Patient lying in bed resting, NAD noted. Safety Precautions maintained, Will Monitor.

## 2024-06-15 NOTE — PLAN OF CARE
06/15/24 1042   Final Note   Assessment Type Final Discharge Note   Anticipated Discharge Disposition Home  (Surgery follow-up)   What phone number can be called within the next 1-3 days to see how you are doing after discharge?   ([phone])   Hospital Resources/Appts/Education Provided Provided patient/caregiver with written discharge plan information;Appointments scheduled and added to AVS;Provided education on problems/symptoms using teachback;Post-Acute resouces added to AVS   Post-Acute Status   Post-Acute Authorization   (Home; surgery follow-up)   Discharge Delays None known at this time

## 2024-06-17 LAB
FINAL PATHOLOGIC DIAGNOSIS: NORMAL
GROSS: NORMAL
Lab: NORMAL
VWF:AC ACT/NOR PPP IA: 111 % (ref 55–200)
VWF:AC ACT/NOR PPP IA: 145 % (ref 55–200)

## 2024-06-18 LAB — VWF:AC ACT/NOR PPP IA: 102 % (ref 55–200)

## 2024-06-20 ENCOUNTER — OFFICE VISIT (OUTPATIENT)
Dept: SURGERY | Facility: CLINIC | Age: 39
End: 2024-06-20
Payer: MEDICARE

## 2024-06-20 VITALS
BODY MASS INDEX: 19.87 KG/M2 | WEIGHT: 149.94 LBS | HEART RATE: 73 BPM | SYSTOLIC BLOOD PRESSURE: 126 MMHG | DIASTOLIC BLOOD PRESSURE: 75 MMHG | HEIGHT: 73 IN

## 2024-06-20 DIAGNOSIS — K35.30 ACUTE APPENDICITIS WITH LOCALIZED PERITONITIS, WITHOUT PERFORATION OR ABSCESS, UNSPECIFIED WHETHER GANGRENE PRESENT: Primary | ICD-10-CM

## 2024-06-20 PROCEDURE — 99213 OFFICE O/P EST LOW 20 MIN: CPT | Mod: PBBFAC | Performed by: SURGERY

## 2024-06-20 PROCEDURE — 99024 POSTOP FOLLOW-UP VISIT: CPT | Mod: POP,,, | Performed by: SURGERY

## 2024-06-20 PROCEDURE — 99999 PR PBB SHADOW E&M-EST. PATIENT-LVL III: CPT | Mod: PBBFAC,,, | Performed by: SURGERY

## 2024-06-20 NOTE — PROGRESS NOTES
40 y/o man with history of lap appy, now about 2 weeks out.  No complaints this am, reports feeling well.    PE: incision c/d/i no evidence of infection or hernia    Impression: post op, doing well    Plan: gradually resume normal activity, normal diet, and follow up as needed.

## 2024-07-12 ENCOUNTER — HOSPITAL ENCOUNTER (EMERGENCY)
Facility: HOSPITAL | Age: 39
Discharge: HOME | End: 2024-07-12
Attending: EMERGENCY MEDICINE
Payer: MEDICARE

## 2024-07-12 VITALS
BODY MASS INDEX: 29.16 KG/M2 | SYSTOLIC BLOOD PRESSURE: 124 MMHG | DIASTOLIC BLOOD PRESSURE: 80 MMHG | TEMPERATURE: 98 F | HEIGHT: 73 IN | RESPIRATION RATE: 20 BRPM | OXYGEN SATURATION: 100 % | WEIGHT: 220 LBS | HEART RATE: 82 BPM

## 2024-07-12 DIAGNOSIS — M79.662 PAIN OF LEFT CALF: ICD-10-CM

## 2024-07-12 DIAGNOSIS — M25.562 LEFT KNEE PAIN: ICD-10-CM

## 2024-07-12 PROCEDURE — 25000003 PHARM REV CODE 250: Performed by: EMERGENCY MEDICINE

## 2024-07-12 PROCEDURE — 99284 EMERGENCY DEPT VISIT MOD MDM: CPT | Mod: 25

## 2024-07-12 RX ORDER — OXYCODONE AND ACETAMINOPHEN 5; 325 MG/1; MG/1
1 TABLET ORAL
Status: COMPLETED | OUTPATIENT
Start: 2024-07-12 | End: 2024-07-12

## 2024-07-12 RX ORDER — HYDROCODONE BITARTRATE AND ACETAMINOPHEN 5; 325 MG/1; MG/1
1 TABLET ORAL EVERY 6 HOURS PRN
Qty: 6 TABLET | Refills: 0 | Status: SHIPPED | OUTPATIENT
Start: 2024-07-12

## 2024-07-12 RX ADMIN — OXYCODONE HYDROCHLORIDE AND ACETAMINOPHEN 1 TABLET: 5; 325 TABLET ORAL at 03:07

## 2024-07-12 NOTE — DISCHARGE INSTRUCTIONS
Recommend following up with Orthopedics within the next 1-2 weeks.    Seek medical care if symptoms worsen or any concerns.    Recommend Tylenol as needed for pain control.  Not improve can use the Norco provided.  Take only as prescribed.

## 2024-07-12 NOTE — ED TRIAGE NOTES
Pt presents to ED c/o left knee pain onset 2 days.  Denies falls, trauma, injury or any other symptoms.  Pt reports taking Tylenol x 5 hrs pta with no relief.

## 2024-07-12 NOTE — ED PROVIDER NOTES
Encounter Date: 7/12/2024    SCRIBE #1 NOTE: I, Aysha Chance, am scribing for, and in the presence of,  Miki Ballard MD. I have scribed the following portions of the note - Other sections scribed: HPI, ROS, PE.       History     Chief Complaint   Patient presents with    Knee Pain     Pt presents to the ED with c/o pain and swelling to left knee x2 days. Denies any known injury/trauma. Last dose of tylenol approx 5 hours ago wit no relief.      Buddy Liu Parrish is a 39 y.o. male, with a PMHx of von willebrand disease, who presents to the ED with left knee pain that radiates to the top of his calf for x2 days. Patient reports associated symptoms of left knee swelling. Pt reports that when he tried to stand up today he could not bear any weight on his left leg. Denies any recent trauma or injuries. No other exacerbating or alleviating factors. Denies fever, chills, or other associated symptoms. Of note pt reports having an appendectomy 6/11/24. He states that he had been out of work for approx x4 weeks and has been back at work for x2 days.   Patient denies any radiation of the pain into the thigh or distal leg.    The history is provided by the patient. No  was used.     Review of patient's allergies indicates:   Allergen Reactions    Flexeril [cyclobenzaprine] Hives and Swelling    Aspirin      Other Reaction(s): bleeding risk    Nsaids (non-steroidal anti-inflammatory drug)      Other Reaction(s): bleeding risk     Past Medical History:   Diagnosis Date    ADHD (attention deficit hyperactivity disorder)     Anxiety     Chronic back pain     Depression     Insomnia     Migraine headache     OCD (obsessive compulsive disorder)     Seasonal allergies      Past Surgical History:   Procedure Laterality Date    CIRCUMCISION      KNEE ARTHROSCOPY      LAPAROSCOPIC APPENDECTOMY N/A 6/13/2024    Procedure: APPENDECTOMY, LAPAROSCOPIC;  Surgeon: Chris Brunner MD;  Location: [facility] OR;   Service: General;  Laterality: N/A;    PATELLA REALIGNMENT Right     lipoma removed     Family History   Problem Relation Name Age of Onset    Hypertension Father      Hypertension Paternal Grandfather       Social History     Tobacco Use    Smoking status: Former     Types: Cigarettes    Smokeless tobacco: Never   Substance Use Topics    Alcohol use: Yes     Comment: social    Drug use: Yes     Types: Marijuana     Review of Systems   Constitutional:  Negative for chills and fever.   Musculoskeletal:  Positive for arthralgias (left knee) and joint swelling (left knee). Myalgias: left calf pain.      Physical Exam     Initial Vitals [07/12/24 0120]   BP Pulse Resp Temp SpO2   138/66 60 16 98.3 °F (36.8 °C) 97 %      MAP       --         Physical Exam    Nursing note and vitals reviewed.  Constitutional: He appears well-developed. He is not diaphoretic. No distress.   HENT:   Head: Normocephalic.   Eyes: EOM are normal.   Cardiovascular:  Normal rate and regular rhythm.           No murmur heard.  Pulses:       Dorsalis pedis pulses are 2+ on the left side.   Pulmonary/Chest: Effort normal and breath sounds normal. He has no wheezes.   Abdominal: Abdomen is soft. He exhibits no distension. There is no abdominal tenderness.   Musculoskeletal:         General: Normal range of motion.      Comments: No obvious knee deformities. Mild effusion over inferior lateral patella. Tenderness to posterior and lateral. No calf tenderness.      Neurological: He is alert.   Skin: Skin is warm.         ED Course   Procedures  Labs Reviewed - No data to display       Imaging Results              US Lower Extremity Veins Left (Final result)  Result time 07/12/24 03:24:16      Final result by Delphine Cuba MD (07/12/24 03:24:16)                   Impression:      No evidence of deep venous thrombosis in the left lower extremity.      Electronically signed by: Delphine Cuba MD  Date:    07/12/2024  Time:    03:24                Narrative:    EXAMINATION:  US LOWER EXTREMITY VEINS LEFT    CLINICAL HISTORY:  Pain in left lower leg    TECHNIQUE:  Duplex and color flow Doppler evaluation and graded compression of the left lower extremity veins was performed.    COMPARISON:  Ultrasound 07/12/2020    FINDINGS:  Left thigh veins: The common femoral, femoral, popliteal, upper greater saphenous, and deep femoral veins are patent and free of thrombus. The veins are normally compressible and have normal phasic flow and augmentation response.    Left calf veins: The visualized calf veins are patent.    Contralateral CFV: The contralateral (right) common femoral vein is patent and free of thrombus.    Miscellaneous: None                                       X-Ray Knee 1 or 2 View Left (Final result)  Result time 07/12/24 03:29:41      Final result by Delphine Cuba MD (07/12/24 03:29:41)                   Impression:      Please see above.      Electronically signed by: Delphine Cuba MD  Date:    07/12/2024  Time:    03:29               Narrative:    EXAMINATION:  XR KNEE 1 OR 2 VIEW LEFT    CLINICAL HISTORY:  Pain in left knee    TECHNIQUE:  One or two views of the left knee were performed.    COMPARISON:  05/15/2014; 07/08/2020    FINDINGS:  Visualized osseous and articular structures appear intact without radiographic evidence of acute osseous injury..  Alignment is within normal limits.  There is suprapatellar joint fluid present.  The no large retained radiopaque foreign body.  There is prepatellar soft tissue edema.                                       Medications   oxyCODONE-acetaminophen 5-325 mg per tablet 1 tablet (1 tablet Oral Given 7/12/24 0322)     Medical Decision Making      39-year-old male presenting with atraumatic left knee pain.  No overlying skin changes or warmth to suggest septic arthritis.  Patient afebrile.  X-ray shows no bony injury.  X-ray obtained due to nature of the patient's work/labor.  The patient does have a  history of von Willebrand's disease.  Ultrasound shows no DVT.  Patient provided short course of Norco due to inability take a NSAIDs.  Reinforced using Tylenol as needed for pain control.  Recommended following up orthopedics.  Patient provided crutches.  Recommended weight-bearing as tolerated.      Differential diagnosis includes knee sprain, meniscus injury, DVT, occult fracture    Amount and/or Complexity of Data Reviewed  Radiology: ordered.    Risk  Prescription drug management.            Scribe Attestation:   Scribe #1: I performed the above scribed service and the documentation accurately describes the services I performed. I attest to the accuracy of the note.                               Clinical Impression:  Final diagnoses:  [M79.662] Pain of left calf  [M25.562] Left knee pain          ED Disposition Condition    Discharge Stable          ED Prescriptions       Medication Sig Dispense Start Date End Date Auth. Provider    HYDROcodone-acetaminophen (NORCO) 5-325 mg per tablet Take 1 tablet by mouth every 6 (six) hours as needed for Pain. 6 tablet 7/12/2024 -- Miki Ballard MD          Follow-up Information       Follow up With Specialties Details Why Contact Info    Jimmy Mims MD Orthopedic Surgery Schedule an appointment as soon as possible for a visit in 1 week Orthopedics [address] [facility] [address]  [phone]      [facility]  Schedule an appointment as soon as possible for a visit in 1 week Primary care [address] [facility] [address] [phone]    [facility] [place] - Emergency Dept Emergency Medicine  If symptoms worsen [address] [facility] [place] [address] [phone]            IMiki, personally performed the services described in this documentation. All medical record entries made by the scribe were at my direction and in my presence. I have reviewed  the chart and agree that the record reflects my personal performance and is accurate and complete.       Miki Ballard MD  07/12/24 2953

## 2024-07-12 NOTE — Clinical Note
"Buddy "Buddy" Francois was seen and treated in our emergency department on 7/12/2024.  He may return to work on 07/16/2024.       If you have any questions or concerns, please don't hesitate to call.      Miki Ballard MD"

## 2024-08-21 ENCOUNTER — OFFICE VISIT (OUTPATIENT)
Dept: FAMILY MEDICINE | Facility: CLINIC | Age: 39
End: 2024-08-21
Payer: MEDICARE

## 2024-08-21 ENCOUNTER — LAB VISIT (OUTPATIENT)
Dept: LAB | Facility: HOSPITAL | Age: 39
End: 2024-08-21
Attending: INTERNAL MEDICINE
Payer: MEDICARE

## 2024-08-21 VITALS
DIASTOLIC BLOOD PRESSURE: 76 MMHG | TEMPERATURE: 98 F | SYSTOLIC BLOOD PRESSURE: 122 MMHG | HEIGHT: 73 IN | WEIGHT: 211.44 LBS | HEART RATE: 82 BPM | OXYGEN SATURATION: 97 % | BODY MASS INDEX: 28.02 KG/M2

## 2024-08-21 DIAGNOSIS — M25.562 CHRONIC PAIN OF LEFT KNEE: ICD-10-CM

## 2024-08-21 DIAGNOSIS — G89.29 CHRONIC PAIN OF LEFT KNEE: ICD-10-CM

## 2024-08-21 DIAGNOSIS — M25.531 RIGHT WRIST PAIN: ICD-10-CM

## 2024-08-21 DIAGNOSIS — Z00.00 HEALTHCARE MAINTENANCE: Primary | ICD-10-CM

## 2024-08-21 DIAGNOSIS — Z00.00 HEALTHCARE MAINTENANCE: ICD-10-CM

## 2024-08-21 DIAGNOSIS — D68.00 VON WILLEBRAND DISEASE: ICD-10-CM

## 2024-08-21 DIAGNOSIS — R79.9 ABNORMAL FINDING OF BLOOD CHEMISTRY, UNSPECIFIED: ICD-10-CM

## 2024-08-21 LAB
ALBUMIN SERPL BCP-MCNC: 4.1 G/DL (ref 3.5–5.2)
ALP SERPL-CCNC: 78 U/L (ref 55–135)
ALT SERPL W/O P-5'-P-CCNC: 20 U/L (ref 10–44)
ANION GAP SERPL CALC-SCNC: 13 MMOL/L (ref 8–16)
AST SERPL-CCNC: 25 U/L (ref 10–40)
BASOPHILS # BLD AUTO: 0.01 K/UL (ref 0–0.2)
BASOPHILS NFR BLD: 0.2 % (ref 0–1.9)
BILIRUB SERPL-MCNC: 0.7 MG/DL (ref 0.1–1)
BUN SERPL-MCNC: 20 MG/DL (ref 6–20)
CALCIUM SERPL-MCNC: 9.4 MG/DL (ref 8.7–10.5)
CHLORIDE SERPL-SCNC: 103 MMOL/L (ref 95–110)
CHOLEST SERPL-MCNC: 175 MG/DL (ref 120–199)
CHOLEST/HDLC SERPL: 3.2 {RATIO} (ref 2–5)
CO2 SERPL-SCNC: 26 MMOL/L (ref 23–29)
CREAT SERPL-MCNC: 1.3 MG/DL (ref 0.5–1.4)
DIFFERENTIAL METHOD BLD: ABNORMAL
EOSINOPHIL # BLD AUTO: 0.1 K/UL (ref 0–0.5)
EOSINOPHIL NFR BLD: 1.9 % (ref 0–8)
ERYTHROCYTE [DISTWIDTH] IN BLOOD BY AUTOMATED COUNT: 15.2 % (ref 11.5–14.5)
EST. GFR  (NO RACE VARIABLE): >60 ML/MIN/1.73 M^2
GLUCOSE SERPL-MCNC: 61 MG/DL (ref 70–110)
HCT VFR BLD AUTO: 41.4 % (ref 40–54)
HDLC SERPL-MCNC: 54 MG/DL (ref 40–75)
HDLC SERPL: 30.9 % (ref 20–50)
HGB BLD-MCNC: 13.1 G/DL (ref 14–18)
IMM GRANULOCYTES # BLD AUTO: 0.01 K/UL (ref 0–0.04)
IMM GRANULOCYTES NFR BLD AUTO: 0.2 % (ref 0–0.5)
LDLC SERPL CALC-MCNC: 105.4 MG/DL (ref 63–159)
LYMPHOCYTES # BLD AUTO: 2.2 K/UL (ref 1–4.8)
LYMPHOCYTES NFR BLD: 52.4 % (ref 18–48)
MCH RBC QN AUTO: 26.8 PG (ref 27–31)
MCHC RBC AUTO-ENTMCNC: 31.6 G/DL (ref 32–36)
MCV RBC AUTO: 85 FL (ref 82–98)
MONOCYTES # BLD AUTO: 0.4 K/UL (ref 0.3–1)
MONOCYTES NFR BLD: 8.5 % (ref 4–15)
NEUTROPHILS # BLD AUTO: 1.6 K/UL (ref 1.8–7.7)
NEUTROPHILS NFR BLD: 36.8 % (ref 38–73)
NONHDLC SERPL-MCNC: 121 MG/DL
NRBC BLD-RTO: 0 /100 WBC
PLATELET # BLD AUTO: 243 K/UL (ref 150–450)
PMV BLD AUTO: 11.8 FL (ref 9.2–12.9)
POTASSIUM SERPL-SCNC: 3.9 MMOL/L (ref 3.5–5.1)
PROT SERPL-MCNC: 7.7 G/DL (ref 6–8.4)
RBC # BLD AUTO: 4.89 M/UL (ref 4.6–6.2)
SODIUM SERPL-SCNC: 142 MMOL/L (ref 136–145)
TRIGL SERPL-MCNC: 78 MG/DL (ref 30–150)
TSH SERPL DL<=0.005 MIU/L-ACNC: 1.04 UIU/ML (ref 0.4–4)
WBC # BLD AUTO: 4.24 K/UL (ref 3.9–12.7)

## 2024-08-21 PROCEDURE — 83036 HEMOGLOBIN GLYCOSYLATED A1C: CPT | Performed by: INTERNAL MEDICINE

## 2024-08-21 PROCEDURE — 99204 OFFICE O/P NEW MOD 45 MIN: CPT | Mod: S$PBB,,, | Performed by: INTERNAL MEDICINE

## 2024-08-21 PROCEDURE — 84443 ASSAY THYROID STIM HORMONE: CPT | Mod: GA | Performed by: INTERNAL MEDICINE

## 2024-08-21 PROCEDURE — 99999 PR PBB SHADOW E&M-EST. PATIENT-LVL V: CPT | Mod: PBBFAC,,, | Performed by: INTERNAL MEDICINE

## 2024-08-21 PROCEDURE — 36415 COLL VENOUS BLD VENIPUNCTURE: CPT | Mod: PN | Performed by: INTERNAL MEDICINE

## 2024-08-21 PROCEDURE — 87389 HIV-1 AG W/HIV-1&-2 AB AG IA: CPT | Performed by: INTERNAL MEDICINE

## 2024-08-21 PROCEDURE — 85025 COMPLETE CBC W/AUTO DIFF WBC: CPT | Performed by: INTERNAL MEDICINE

## 2024-08-21 PROCEDURE — 80053 COMPREHEN METABOLIC PANEL: CPT | Performed by: INTERNAL MEDICINE

## 2024-08-21 PROCEDURE — 99215 OFFICE O/P EST HI 40 MIN: CPT | Mod: PBBFAC,PN | Performed by: INTERNAL MEDICINE

## 2024-08-21 PROCEDURE — 80061 LIPID PANEL: CPT | Performed by: INTERNAL MEDICINE

## 2024-08-21 RX ORDER — ACETAMINOPHEN 500 MG
1000 TABLET ORAL 2 TIMES DAILY PRN
Qty: 60 TABLET | Refills: 0 | Status: SHIPPED | OUTPATIENT
Start: 2024-08-21

## 2024-08-21 NOTE — PROGRESS NOTES
HISTORY OF PRESENT ILLNESS:  Buddy Parrish is a 39 y.o. male who presents to the clinic today for Establish Care, Knee Pain (Left knee pain and stuffiness since 06/2024), and Hand Pain (Right hand)    This is my first encounter with patient.    Left knee arthroscopy done in past for left knee chondromalacia - Dr. Grant at [facility] 2015.  Since then seen by Dr. Ayon 2020 for knee and shoulder.  He notes h/o injuries to knees and playing multiple sports.    Notes right wrist laceration some years ago - had repair at that time.  Notes now with pain and limited ROM to the right wrist since then.    Noted h/o von willebrand disease and cannot take NSAID.  Takes Tylenol.    Seen in ED 7/2024 for left calf pain.  U/S negative.    PAST MEDICAL HISTORY:  Past Medical History:   Diagnosis Date    Anxiety     Chronic back pain     Depression     Insomnia     Migraine headache     Seasonal allergies        PAST SURGICAL HISTORY:  Past Surgical History:   Procedure Laterality Date    CIRCUMCISION      KNEE ARTHROSCOPY      LAPAROSCOPIC APPENDECTOMY N/A 06/13/2024    Procedure: APPENDECTOMY, LAPAROSCOPIC;  Surgeon: Chris Brunner MD;  Location: [facility];  Service: General;  Laterality: N/A;    LIPOMA RESECTION         SOCIAL HISTORY:  Social History     Socioeconomic History    Marital status: Single    Number of children: 2   Occupational History    Occupation: lawn care   Tobacco Use    Smoking status: Former     Types: Cigarettes    Smokeless tobacco: Never   Substance and Sexual Activity    Alcohol use: Yes     Comment: social    Drug use: Yes     Types: Marijuana    Sexual activity: Yes     Partners: Female     Birth control/protection: None     Social Determinants of Health     Food Insecurity: No Food Insecurity (5/18/2020)    Hunger Vital Sign     Worried About Running Out of Food in the Last Year: Never true     Ran Out of Food in the Last Year: Never true   Transportation Needs: No Transportation  Needs (5/18/2020)    PRAPARE - Transportation     Lack of Transportation (Medical): No     Lack of Transportation (Non-Medical): No   Physical Activity: Insufficiently Active (5/18/2020)    Exercise Vital Sign     Days of Exercise per Week: 3 days     Minutes of Exercise per Session: 30 min   Stress: No Stress Concern Present (5/18/2020)    [employer] - Occupational Stress Questionnaire     Feeling of Stress : Not at all       FAMILY HISTORY:  Family History   Problem Relation Name Age of Onset    Hypertension Father      Hypertension Paternal Grandfather         ALLERGIES AND MEDICATIONS: updated and reviewed.  Review of patient's allergies indicates:   Allergen Reactions    Flexeril [cyclobenzaprine] Hives and Swelling    Aspirin      Other Reaction(s): bleeding risk    Nsaids (non-steroidal anti-inflammatory drug)      Other Reaction(s): bleeding risk     Medication List with Changes/Refills   New Medications    ACETAMINOPHEN (TYLENOL) 500 MG TABLET    Take 2 tablets (1,000 mg total) by mouth 2 (two) times daily as needed for Pain.   Current Medications    CYANOCOBALAMIN, VITAMIN B-12, (VITAMIN B-12) 50 MCG TABLET    Take 50 mcg by mouth once daily.   Discontinued Medications    DOCUSATE SODIUM (COLACE) 100 MG CAPSULE    Take 1 capsule (100 mg total) by mouth once daily.    HYDROCODONE-ACETAMINOPHEN (NORCO) 5-325 MG PER TABLET    Take 1 tablet by mouth every 6 (six) hours as needed for Pain.    ONDANSETRON (ZOFRAN) 4 MG TABLET    Take 1 tablet (4 mg total) by mouth every 6 (six) hours as needed for Nausea.    OXYCODONE-ACETAMINOPHEN (PERCOCET) 7.5-325 MG PER TABLET    Take 1 tablet by mouth every 6 (six) hours as needed for Pain.    POLYETHYLENE GLYCOL (GLYCOLAX) 17 GRAM PWPK    Take 17 g by mouth daily as needed for Constipation.          CARE TEAM:  Patient Care Team:  Jose Angel Ca MD as PCP - General (Family Medicine)  Riley Trammell RN as Care Coordinator          REVIEW OF SYSTEMS:  Review of Systems   Constitutional:  Negative for chills and fever.   HENT:  Negative for congestion and postnasal drip.    Eyes:  Negative for photophobia and visual disturbance.   Respiratory:  Negative for cough and shortness of breath.    Cardiovascular:  Negative for chest pain and palpitations.   Gastrointestinal:  Negative for nausea and vomiting.   Genitourinary:  Negative for dysuria and frequency.   Musculoskeletal:  Positive for arthralgias. Negative for gait problem and joint swelling.   Neurological:  Negative for light-headedness and headaches.   Psychiatric/Behavioral:  Negative for dysphoric mood. The patient is not nervous/anxious.          PHYSICAL EXAM:  Vitals:    08/21/24 1101   BP: 122/76   Pulse: 82   Temp: 98.1 °F (36.7 °C)             Body mass index is 27.89 kg/m².    Physical Examination: General appearance - alert, well appearing, and in no distress  Mental status - alert, oriented to person, place, and time  Eyes - pupils equal and reactive, extraocular eye movements intact  Chest - clear to auscultation, no wheezes, rales or rhonchi, symmetric air entry  Heart - normal rate, regular rhythm, normal S1, S2, no murmurs, rubs, clicks or gallops  Neurological - alert, oriented, normal speech, no focal findings or movement disorder noted  Musculoskeletal - no joint tenderness, deformity or swelling  Extremities - peripheral pulses normal, no pedal edema, no clubbing or cyanosis       ASSESSMENT AND PLAN:  Healthcare maintenance  -     Hemoglobin A1C; Future; Expected date: 08/21/2024  -     Comprehensive Metabolic Panel; Future; Expected date: 08/21/2024  -     Lipid Panel; Future; Expected date: 08/21/2024  -     CBC Auto Differential; Future; Expected date: 08/21/2024  -     TSH; Future; Expected date: 08/21/2024  -     HIV 1/2 Ag/Ab (4th Gen); Future; Expected date: 08/21/2024    Chronic pain of left knee  -     Ambulatory referral/consult to Orthopedics;  Future; Expected date: 08/28/2024  -     acetaminophen (TYLENOL) 500 MG tablet; Take 2 tablets (1,000 mg total) by mouth 2 (two) times daily as needed for Pain.  Dispense: 60 tablet; Refill: 0    Abnormal finding of blood chemistry, unspecified  -     Hemoglobin A1C; Future; Expected date: 08/21/2024  -     Lipid Panel; Future; Expected date: 08/21/2024  -     CBC Auto Differential; Future; Expected date: 08/21/2024  -     TSH; Future; Expected date: 08/21/2024    Right wrist pain  -     Ambulatory referral/consult to Hand Surgery; Future; Expected date: 08/28/2024  -     acetaminophen (TYLENOL) 500 MG tablet; Take 2 tablets (1,000 mg total) by mouth 2 (two) times daily as needed for Pain.  Dispense: 60 tablet; Refill: 0    Von Willebrand disease  -     Ambulatory referral/consult to Hematology / Oncology; Future; Expected date: 08/28/2024                Follow up 3 months or sooner as needed.

## 2024-08-22 LAB
ESTIMATED AVG GLUCOSE: 103 MG/DL (ref 68–131)
HBA1C MFR BLD: 5.2 % (ref 4–5.6)
HIV 1+2 AB+HIV1 P24 AG SERPL QL IA: NORMAL

## 2024-08-26 DIAGNOSIS — M25.562 LEFT KNEE PAIN, UNSPECIFIED CHRONICITY: Primary | ICD-10-CM

## 2024-09-04 ENCOUNTER — TELEPHONE (OUTPATIENT)
Dept: HEMATOLOGY/ONCOLOGY | Facility: CLINIC | Age: 39
End: 2024-09-04
Payer: MEDICARE

## 2024-09-04 NOTE — TELEPHONE ENCOUNTER
Tc to pt to change his appt as Dr fontanez is out of the office at his appt time  message kleft on recorder for him to contact office

## 2024-09-05 ENCOUNTER — PATIENT MESSAGE (OUTPATIENT)
Dept: ORTHOPEDICS | Facility: CLINIC | Age: 39
End: 2024-09-05

## 2024-09-05 ENCOUNTER — OFFICE VISIT (OUTPATIENT)
Dept: ORTHOPEDICS | Facility: CLINIC | Age: 39
End: 2024-09-05
Payer: MEDICARE

## 2024-09-05 VITALS — WEIGHT: 211.44 LBS | HEIGHT: 73 IN | BODY MASS INDEX: 28.02 KG/M2

## 2024-09-05 DIAGNOSIS — G89.29 CHRONIC PAIN OF LEFT KNEE: ICD-10-CM

## 2024-09-05 DIAGNOSIS — M25.562 CHRONIC PAIN OF LEFT KNEE: ICD-10-CM

## 2024-09-05 DIAGNOSIS — S76.302A LEFT HAMSTRING INJURY, INITIAL ENCOUNTER: ICD-10-CM

## 2024-09-05 DIAGNOSIS — M23.92 INTERNAL DERANGEMENT OF LEFT KNEE: Primary | ICD-10-CM

## 2024-09-05 PROCEDURE — 99999 PR PBB SHADOW E&M-EST. PATIENT-LVL III: CPT | Mod: PBBFAC,,, | Performed by: ORTHOPAEDIC SURGERY

## 2024-09-05 PROCEDURE — 99204 OFFICE O/P NEW MOD 45 MIN: CPT | Mod: S$PBB,,, | Performed by: ORTHOPAEDIC SURGERY

## 2024-09-05 PROCEDURE — 99213 OFFICE O/P EST LOW 20 MIN: CPT | Mod: PBBFAC,25,PN | Performed by: ORTHOPAEDIC SURGERY

## 2024-09-05 RX ORDER — METHYLPREDNISOLONE 4 MG/1
TABLET ORAL
Qty: 1 EACH | Refills: 0 | Status: SHIPPED | OUTPATIENT
Start: 2024-09-05

## 2024-09-05 NOTE — PROGRESS NOTES
NEW PATIENT ORTHOPAEDIC: Knee    PRIMARY CARE PHYSICIAN: Jose Angel Ca MD   REFERRING PROVIDER: Massimo Ragsdale MD  [address] [place] [address]     ASSESSMENT & PLAN:    Impression:  Left Knee Mild Osteoarthritis, Primary    Left Knee Hamstring Strain  Left Knee internal derangement    Follow Up Plan: Virtual MRI     Non operative care:    Buddy Parrish has physical exam evidence of above and wishes to pursue an non-operative care. I am recommending the following: MRI, PT, Medrol Dose pack    Patient comes in with a three-month history of acute on chronic knee pain.  He underwent left knee arthroscopy in 2016 at [facility].  I do not have records of that encounter.  He reports he had a cyst and some clean-up type procedure.  He was doing okay after then and then he began to develop more different pain.  No specific injury.  He has pain in the posterior and lateral aspects of his knee.  He reports intermittent instability, intermittent effusions, continued daily pain.  He is unable to tolerate anti-inflammatories secondary to being von Willebrand's disease.  X-rays demonstrate some mild arthritis.  His exam he is pretty tender over his biceps femoris tendon and his posterior lateral knee capsule either in the hamstrings or calf insertion.  He has some lateral joint line pain.  He has some crepitus with range of motion that is painful.  I think getting a better look with an MRI with his history of surgeries important for developing a targeted source.  We will also put him on a Medrol Dosepak to see if this helps with the inflammation and around his hamstring and knee.  Therapy I think he would be beneficial and MRI we will give a targeted approach to that.    The patient has been ordered:  Physical Therapy  MRI (Criteria for MRI - xray is equivocal)  Pain Prescription    CONSULTS:   None    ACTIVE PROBLEM LIST  Patient Active Problem List   Diagnosis    Laceration of left eyebrow     Chronic low back pain    Chondromalacia of left knee    Impaired range of motion of shoulder    Right shoulder pain    Posture imbalance    Effusion of right knee    Calf swelling    Chronic right shoulder pain    Chronic pain of right knee    Depressive disorder    Herniated lumbar intervertebral disc    Obsessive-compulsive disorder    Acute appendicitis    Von Willebrand disease           SUBJECTIVE    CHIEF COMPLAINT: Knee Pain    HPI:   Buddy Parrish is a 39 y.o. male here for evaluation and management of left knee pain. There is not a specific incident that brought about this pain. he has had progressive problems with the knee(s) starting 3 months ago but is now progressing to interfere with activities which include: walking, participating in family activities, and enjoying hobbies    Currently the pain in the joint is rated at moderate with activity. The pain is constant and is located in the knee, located laterally and located posterior. The pain is described as aching, severe, sharp, and stiffness. Relieving factors include rest, over the counter medication , and repositioning.     There is associated Clicking and Popping.     Buddy Parrish has no additional complaints.     PROGRESSIVE SYMPTOMS:  Pain effecting living situation  Pain limiting ability to stay fit and healthy    FUNCTIONAL STATUS:   Participate in recreational activities     PREVIOUS TREATMENTS:  Medical: Tylenol  Physical Therapy: Activities Modified   Previous Orthopaedic Surgery: Left knee arthroscopsy     REVIEW OF SYSTEMS:  PAIN ASSESSMENT:  See HPI.  MUSCULOSKELETAL: See HPI.  OTHER 10 point review of systems is negative except as stated in HPI above    PAST MEDICAL HISTORY   has a past medical history of Anxiety, Chronic back pain, Depression, Insomnia, Migraine headache, and Seasonal allergies.     PAST SURGICAL HISTORY   has a past surgical history that includes Knee arthroscopy; Circumcision;  "Laparoscopic appendectomy (N/A, 06/13/2024); and Lipoma resection.     FAMILY HISTORY  family history includes Hypertension in his father and paternal grandfather.     SOCIAL HISTORY   reports that he has quit smoking. His smoking use included cigarettes. He has never used smokeless tobacco. He reports current alcohol use. He reports current drug use. Drug: Marijuana.     ALLERGIES   Review of patient's allergies indicates:   Allergen Reactions    Flexeril [cyclobenzaprine] Hives and Swelling    Aspirin      Other Reaction(s): bleeding risk    Nsaids (non-steroidal anti-inflammatory drug)      Other Reaction(s): bleeding risk        MEDICATIONS  Current Outpatient Medications on File Prior to Visit   Medication Sig Dispense Refill    acetaminophen (TYLENOL) 500 MG tablet Take 2 tablets (1,000 mg total) by mouth 2 (two) times daily as needed for Pain. 60 tablet 0    cyanocobalamin, vitamin B-12, (VITAMIN B-12) 50 mcg tablet Take 50 mcg by mouth once daily.       No current facility-administered medications on file prior to visit.          PHYSICAL EXAM   height is 6' 1" (1.854 m) and weight is 95.9 kg (211 lb 6.7 oz).   Body mass index is 27.89 kg/m².      All other systems deferred.  GENERAL:  No acute distress  HABITUS: Normal  GAIT: Antalgic  SKIN: Normal  and No erythema, warmth, fluctuance     KNEE EXAM:    left:   Effusion: Small joint effusion  TTP: Yes over Lateral Joint Line and biceps femoris and posterolateral knee capsule  Yes crepitus with passive knee ROM  Passive ROM: Extension 0, Flexion 120 (limited by pain) - pain with terminal extension and flexion  No pain with manipulation of patella  Stable to varus/valgus stress. No increased laxity to anterior/posterior drawer testing  positive Dina's test  No pain with IR/ER rotation of the hip  5/5 strength in knee flexion and extension, ankle plantarflexion and dorsiflexion  Neurovascular Status: Sensation intact to light touch in Sural, Saphenous, SPN, " DPN, Tibial nerve distribution  2+ pulse DP/PT, normal capillary refill, foot has normal warmth    DATA:  Diagnostic tests reviewed for today's visit:     4v of the knee reveal Mild degenerative changes of the Medial, Lateral, and Patellofemoral compartment. There is evidence of advanced osteoarthritis changes with Osteophyte formation and Joint space narrowing. The limb is in netural alignment. The patella is tracking midline.

## 2024-09-06 ENCOUNTER — CLINICAL SUPPORT (OUTPATIENT)
Dept: REHABILITATION | Facility: HOSPITAL | Age: 39
End: 2024-09-06
Attending: ORTHOPAEDIC SURGERY
Payer: MEDICARE

## 2024-09-06 DIAGNOSIS — R29.898 WEAKNESS OF LEFT LOWER EXTREMITY: Primary | ICD-10-CM

## 2024-09-06 DIAGNOSIS — M25.562 ACUTE PAIN OF LEFT KNEE: ICD-10-CM

## 2024-09-06 DIAGNOSIS — M23.92 INTERNAL DERANGEMENT OF LEFT KNEE: ICD-10-CM

## 2024-09-06 DIAGNOSIS — Z74.09 IMPAIRED FUNCTIONAL MOBILITY, BALANCE, GAIT, AND ENDURANCE: ICD-10-CM

## 2024-09-06 PROCEDURE — 97110 THERAPEUTIC EXERCISES: CPT | Mod: PN

## 2024-09-06 PROCEDURE — 97161 PT EVAL LOW COMPLEX 20 MIN: CPT | Mod: PN

## 2024-09-06 NOTE — PLAN OF CARE
[facility]  Physical Therapy Initial Evaluation    Date: 9/6/2024   Name: Buddyguillaume Parrish  Clinic Number: [id number]    Therapy Diagnosis:   Encounter Diagnoses   Name Primary?    Internal derangement of left knee     Weakness of left lower extremity Yes    Impaired functional mobility, balance, gait, and endurance     Acute pain of left knee      Physician: Jesus Orellana MD    Physician Orders: PT Eval and Treat   Medical Diagnosis from Referral: M23.92 (ICD-10-CM) - Internal derangement of left knee   Evaluation Date: 9/6/2024  Authorization Period Expiration: 12/31/24  Plan of Care Expiration: 11/6/24  Visit # / Visits authorized: 1/ 20   Progress Note Due: 10/6/24  FOTO: 1/ 1    Precautions: Standard    Time In: 8:06  Time Out: 9:00  Total Appointment Time (timed & untimed codes): 54 minutes    Subjective   Date of onset: pain been ongoing but worsened about 3 months ago  History of current condition - Buddy reports: a three-month history of acute on chronic knee pain, where the knee swells and becomes difficult to flex and extend. Pt reports that he underwent left knee surgery in 2016 for a cyst and a clean-up type procedure behind the patella. Pt reports that he still had pain after the surgery, but he was doing okay until the pain became more severe. Pt reports that he fell from a tree a while ago, but it was on his R side, and he did PT for his shoulder, which was injured at the time. Pt reports that he has not had any specific injury or traumatic event to cause his current knee pain. Pt pinpoints the posterior and lateral aspects of his knee as the pain site. Pt reports that he does not take pain meds regularly or limits them due to an inability to tolerate anti-inflammatories secondary to von Willebrand's disease. Pt reports and demonstrates crepitus with knee flexion and extension, which is painful. Pt reports that he is awaiting scheduling for an MRI of his  "knee.    ALEX: There is not a specific incident that brought about this pain.   Any popping: yes  Any Locking: yes  Any buckling: yes  Pain radiates: yes, throbbing feeling in calf  Pain constant or intermittent: intermittent   Any injections: no    Pain:  Current 5/10, worst 10/10, best 5/10   Location: left knee, lateral knee joint line and biceps femoris area   Description: Aching, Throbbing, Tight, Sharp, and severe   Aggravating Factors: STS, flexing and extending the L knee, walking as noted by an antalgic gait, cannot participate in flag football, or enjoy time doing activities with his 2 sons w  Easing Factors: rest, does not find relief with much     Prior Therapy: yes years before for his shoulder   Social History: lives with their 2 sons  Occupation:  services   Prior Level of Function: independent   Current Level of Function: independent with pain    Pts goals: is to get the knee to 85% (pt stated that the knee is probably at 60% right now)    Imaging, X-ray: L knee  Per pt's chart on 8/26/24 "COMPARISON:  Left knee radiograph 07/12/2024     FINDINGS:  No evidence for acute fracture or osseous destructive lesion.     Small suprapatellar joint effusion.     Joint spaces are maintained.     Soft tissues are unremarkable."     Medical History:   Past Medical History:   Diagnosis Date    Anxiety     Chronic back pain     Depression     Insomnia     Migraine headache     Seasonal allergies        Surgical History:   Buddy Parrish  has a past surgical history that includes Knee arthroscopy; Circumcision; Laparoscopic appendectomy (N/A, 06/13/2024); and Lipoma resection.    Medications:   Buddy has a current medication list which includes the following prescription(s): acetaminophen, cyanocobalamin (vitamin b-12), and methylprednisolone.    Allergies:   Review of patient's allergies indicates:   Allergen Reactions    Flexeril [cyclobenzaprine] Hives and Swelling    Aspirin      Other " Reaction(s): bleeding risk    Nsaids (non-steroidal anti-inflammatory drug)      Other Reaction(s): bleeding risk          Objective       Observation:     Posture Alignment: decreased knee flexion and extension during gait     Sensation: intact to light touch    DTR: intact to light touch    GAIT DEVIATIONS: Buddy ambulates with no AD and displays antalgic gait;decreased stance time on  L LE;decreased step length    Range of Motion:   Knee Left active Left Passive   Flexion 125 NT   Extension 3 0 but painful and muscle guarding      Knee Right active Right Passive   Flexion 155 NT   Extension 0 NT       Lower Extremity Strength   Right LE  Left LE    Knee extension: 5/5 Knee extension: 4-/5, pain in lateral meniscus area, and lateral quad tendon   Knee flexion: 4+/5 Knee flexion: 3/5, pain at biceps femoris insertion   Hip flexion: 4+/5 Hip flexion: 3+/5   Hip extension:  4+/5 Hip extension: 3+/5   Hip abduction: 4-/5 Hip abduction: 3/5   Hip adduction: NT Hip adduction NT   Ankle dorsiflexion: 4+/5 Ankle dorsiflexion: 4+/5   Ankle plantarflexion: 4+/5 Ankle plantarflexion: 4+/5       Special Tests:   Right Left   Valgus Stress Test Negative Negative   Varus Stress test Negative Negative   Posterior Drawer Negative Negative   Anterior Drawer Negative Negative   Dina's Test Negative Positive, lateral knee with ER   Apley's Compression Negative Positive, pain on the release   Apley's Distraction Negative Negative   Xiao's compression test Negative Negative   Thessaly's Test Negative Positive, pain in lateral meniscus and HS area      Squat: Positive, 7/10 patella tendon area   Single leg balance: Positive, 5/10    Joint Mobility:    Patellar sup./inf: test next visit   Patellar med/lat: test next visit    Palpation: TTP over Lateral Joint Line and biceps femoris and posterolateral knee capsule    Crepitus: yes    Patella: NT   Joint line: TTP in medial 6/10 in lateral meniscus area, 4/10 in medial meniscus     MCL/LCL: TTP MCL area    Popliteal fossa:   Patellar tendon: TTP   Quad tendon: TTP   Tibial tubercle: no pain   IT band: no pain     Flexibility: decreased hamstring flexibility noted secondary to decreased knee ROM and pain at biceps femoris and calf area     Edema: some joint effusion causing limited ROM    CMS Impairment/Limitation/Restriction for FOTO Survey    Therapist reviewed FOTO scores for Buddy Parrish on 9/6/2024.   FOTO documents entered into [employer] - see Media section.    Limitation Score: %         TREATMENT   Treatment Time In: 8:06  Treatment Time Out: 9:00  Total Treatment time separate from Evaluation: 15 minutes    Buddy received therapeutic exercises to develop strength, ROM, and flexibility for 15 minutes including:  Quad sets 2x10, 5 sec   SL hip ABD 2x10  Heel slides 2x10  SLR (attempted but discontinued d/t 9/10 pain)    Home Exercises and Patient Education Provided    Education provided:   - PT role  -perform HEP at least 2x/day  -activity modifications to prevent aggravation of symptoms    Written Home Exercises Provided: yes.  Exercises were reviewed and Buddy was able to demonstrate them prior to the end of the session.  Buddy demonstrated good  understanding of the education provided.     See EMR under Patient Instructions for exercises provided 9/6/2024.    Assessment   Buddy is a 39 y.o. male referred to outpatient Physical Therapy with a medical diagnosis of Internal derangement of left knee. Upon evaluation, pt demonstrated L knee pain, effusion, stiffness, and weakness, limiting his participation and functional capacity for daily activities. PT will focus on addressing pt's impairments, including improving strength, mobility, and reducing pain, to enhance function and assist him in returning to his PLOF. The subjective and objective findings are addressed through specific goals outlined in the plan of care.      Pt prognosis is Good.   Pt will benefit from  skilled outpatient Physical Therapy to address the deficits stated above and in the chart below, provide pt/family education, and to maximize pt's level of independence.     Plan of care discussed with patient: Yes  Pt's spiritual, cultural and educational needs considered and patient is agreeable to the plan of care and goals as stated below:     Anticipated Barriers for therapy: none    Medical Necessity is demonstrated by the following  History  Co-morbidities and personal factors that may impact the plan of care [x] LOW: no personal factors / co-morbidities  [] MODERATE: 1-2 personal factors / co-morbidities  [] HIGH: 3+ personal factors / co-morbidities    Moderate / High Support Documentation:   Co-morbidities affecting plan of care:   Past Medical History:   Diagnosis Date    Anxiety     Chronic back pain     Depression     Insomnia     Migraine headache     Seasonal allergies        Personal Factors:   no deficits     Examination  Body Structures and Functions, activity limitations and participation restrictions that may impact the plan of care [x] LOW: addressing 1-2 elements  [] MODERATE: 3+ elements  [] HIGH: 4+ elements (please support below)    Moderate / High Support Documentation: L knee pain, L LE weakness     Clinical Presentation [x] LOW: stable  [] MODERATE: Evolving  [] HIGH: Unstable     Decision Making/ Complexity Score: low         GOALS: Short Term Goals:  4 weeks  1.Report decreased in pain at worse less than  <   / =  5  /10  to increase tolerance for functional mobility.On going  2. Pt to improve L knee range of motion by 25% to allow for improved functional mobility to allow for improvement in IADLs. .On going  3. Increased L LE MMT 1/2 grade to increase tolerance for ADL and work activities.On going  4. Pt to tolerate HEP to improve ROM and independence with ADL's.On going    Long Term Goals: 8 weeks  1.Report decreased in pain at worse less than  <   / =  2  /10  to increase tolerance  for functional mobility. On going  2.Pt to improve L knee range of motion by 75% to allow for improved functional mobility to allow for improvement in IADLs. On going  3.Increased L LE MMT 1 grade to increase tolerance for ADL and work activities.On going  4. Pt will score 85% or greater on FOTO knee survey  to demonstrate increase in LE function with every day tasks. On going  5. Pt to be Independent with HEP to improve ROM and independence with ADL's. On going    Plan   Plan of care Certification: 9/6/2024 to 11/6/24.    Outpatient Physical Therapy 1-2 times weekly for 8 weeks to include the following interventions: Electrical Stimulation , Gait Training, Manual Therapy, Moist Heat/ Ice, Neuromuscular Re-ed, Patient Education, Therapeutic Activities, and Therapeutic Exercise. Tory Mitchell, PT, DPT      I CERTIFY THE NEED FOR THESE SERVICES FURNISHED UNDER THIS PLAN OF TREATMENT AND WHILE UNDER MY CARE   Physician's comments:     Physician's Signature: ___________________________________________________

## 2024-09-16 ENCOUNTER — TELEPHONE (OUTPATIENT)
Dept: HEMATOLOGY/ONCOLOGY | Facility: CLINIC | Age: 39
End: 2024-09-16
Payer: MEDICARE

## 2024-09-16 ENCOUNTER — CLINICAL SUPPORT (OUTPATIENT)
Dept: REHABILITATION | Facility: HOSPITAL | Age: 39
End: 2024-09-16
Payer: MEDICARE

## 2024-09-16 DIAGNOSIS — M25.562 ACUTE PAIN OF LEFT KNEE: ICD-10-CM

## 2024-09-16 DIAGNOSIS — R29.898 WEAKNESS OF LEFT LOWER EXTREMITY: Primary | ICD-10-CM

## 2024-09-16 DIAGNOSIS — Z74.09 IMPAIRED FUNCTIONAL MOBILITY, BALANCE, GAIT, AND ENDURANCE: ICD-10-CM

## 2024-09-16 PROCEDURE — 97140 MANUAL THERAPY 1/> REGIONS: CPT | Mod: PN

## 2024-09-16 PROCEDURE — 97110 THERAPEUTIC EXERCISES: CPT | Mod: PN

## 2024-09-16 PROCEDURE — 97530 THERAPEUTIC ACTIVITIES: CPT | Mod: PN

## 2024-09-16 NOTE — TELEPHONE ENCOUNTER
To Alesia Hogan  [place] you [place] [facility] [place] evaluate for VWB disease  Thank you Teodoro Castanon RN    2nd notice   Your recently [place] this patient [place] [facility] [place] [id number] do not treat Von Willebrand disease      please re schedule at another location of your choice  Thank you Teodoro castanon     Tc to pt to advise him of above and to be expecting a call from a  [place] [facility] [place] acknowledged understanding

## 2024-09-16 NOTE — PROGRESS NOTES
[facility]   Physical Therapy Treatment Note     Name: Buddy Parrish  Clinic Number: [id number]    Therapy Diagnosis:   Encounter Diagnoses   Name Primary?    Weakness of left lower extremity Yes    Impaired functional mobility, balance, gait, and endurance     Acute pain of left knee      Physician: Jesus Orellana MD    Visit Date: 9/16/2024    Physician Orders: PT Eval and Treat   Medical Diagnosis from Referral: M23.92 (ICD-10-CM) - Internal derangement of left knee   Evaluation Date: 9/6/2024  Authorization Period Expiration: 12/31/24  Plan of Care Expiration: 11/6/24  Visit # / Visits authorized: 1/ 20   Progress Note Due: 10/6/24  FOTO: 1/ 1     Precautions: Standard    Time In: 8:03  Time Out: 8:59  Total Billable Time: 56 minutes      SUBJECTIVE     Pt reports: that he is feeling okay, has some pain in the knee. Pt reports that he is still waiting to get scheduled for his MRI.  He was compliant with home exercise program.  Response to previous treatment: 1st f/u  Functional change: Ongoing     Pain: 5/10  Location: left knee      OBJECTIVE     Objective Measures updated at progress report unless specified.       TREATMENT     Buddy received the treatments listed below:      Exercises performed: in bold     therapeutic activities to improve functional performance and functional mobility for 8  minutes, including:  Recumbent Bike     received therapeutic exercises to develop strength and ROM for 35 minutes including:  Quad sets 2x10, 3 sec   SL hip ABD 3x10  Standing hip EXT /c YTB 2x10  Heel slides 2x10  SLR 2x10 (attempted but discontinued on 9/6/24 d/t 9/10 pain) (today 9/16/24, 6/10 pain in the hip)  Seated knee extensions through partial or full range of motion   Clamshells or sidelying hip abduction   Supine hip extension (bridge exercises.)   Prone hamstring flexion 3x10 /c cupping    Shuttle squat /c YTB around knees (2 black and 1 red band) 2x10  Standing  terminal knee extension against band resistance.      neuromuscular re-education activities to improve: Balance, Proprioception, Posture, and muscles motor control for 00 minutes. The following activities were included:      received the following manual therapy techniques: Soft tissue Mobilization using cupping were applied to the: L hamstring and gastroc for 10 minutes, including:      PATIENT EDUCATION AND HOME EXERCISES     Home Exercises Provided and Patient Education Provided     Education provided:   - PT role  -perform HEP at least 2x/day  -activity modifications to prevent aggravation of symptoms    Written Home Exercises Provided: Patient instructed to cont prior HEP. Exercises were reviewed and Buddy was able to demonstrate them prior to the end of the session.  Buddy demonstrated good  understanding of the education provided. See EMR under Patient Instructions for exercises provided during therapy sessions    ASSESSMENT     Buddy is a 39 y.o. male who presents to his 1st PT f/u with a medical diagnosis of internal derangement of the L knee. MT, including cupping, was introduced to the pt's L hamstrings and calf with AROM knee flexion. Pt reported decreased pain with ROM but felt a catching sensation in the knee from mid-range to end ROM flexion and extension. Pt reported a slight decrease in pain, from 9/10 at eval to 6/10 today, in the hip during SLR. Pt tolerated all other therex well, though demonstrated difficulty due to L LE weakness. Pt continues to present with L knee pain, effusion, stiffness, and weakness, limiting his participation and functional capacity for daily activities. PT will focus on addressing the pt's impairments, including improving strength, mobility, and reducing pain to enhance function and assist him in returning to his PLOF.    Buddy Is progressing well towards his goals.   Pt prognosis is Good.     Pt will continue to benefit from skilled outpatient physical therapy to  address the deficits listed in the problem list box on initial evaluation, provide pt/family education and to maximize pt's level of independence in the home and community environment.     Pt's spiritual, cultural and educational needs considered and pt agreeable to plan of care and goals.     Anticipated barriers to physical therapy: none     GOALS: Short Term Goals:  4 weeks  1.Report decreased in pain at worse less than  <   / =  5  /10  to increase tolerance for functional mobility.On going  2. Pt to improve L knee range of motion by 25% to allow for improved functional mobility to allow for improvement in IADLs. .On going  3. Increased L LE MMT 1/2 grade to increase tolerance for ADL and work activities.On going  4. Pt to tolerate HEP to improve ROM and independence with ADL's.On going     Long Term Goals: 8 weeks  1.Report decreased in pain at worse less than  <   / =  2  /10  to increase tolerance for functional mobility. On going  2.Pt to improve L knee range of motion by 75% to allow for improved functional mobility to allow for improvement in IADLs. On going  3.Increased L LE MMT 1 grade to increase tolerance for ADL and work activities.On going  4. Pt will score 85% or greater on FOTO knee survey  to demonstrate increase in LE function with every day tasks. On going  5. Pt to be Independent with HEP to improve ROM and independence with ADL's. On going    PLAN     Cont with current and progress as tolerated     Esthere Paula PT, DPT

## 2024-09-17 ENCOUNTER — TELEPHONE (OUTPATIENT)
Dept: FAMILY MEDICINE | Facility: CLINIC | Age: 39
End: 2024-09-17
Payer: MEDICARE

## 2024-09-17 NOTE — TELEPHONE ENCOUNTER
Called and left a message for the patient to reschedule Hem/Oncology appointment due to diagnose not treatable [place] [facility] [place].

## 2024-09-23 ENCOUNTER — TELEPHONE (OUTPATIENT)
Dept: HEMATOLOGY/ONCOLOGY | Facility: CLINIC | Age: 39
End: 2024-09-23
Payer: MEDICARE

## 2024-09-24 ENCOUNTER — TELEPHONE (OUTPATIENT)
Dept: HEMATOLOGY/ONCOLOGY | Facility: CLINIC | Age: 39
End: 2024-09-24
Payer: MEDICARE

## 2024-10-01 ENCOUNTER — PATIENT MESSAGE (OUTPATIENT)
Dept: ORTHOPEDICS | Facility: CLINIC | Age: 39
End: 2024-10-01
Payer: MEDICARE

## 2024-10-02 ENCOUNTER — CLINICAL SUPPORT (OUTPATIENT)
Dept: REHABILITATION | Facility: HOSPITAL | Age: 39
End: 2024-10-02
Payer: MEDICARE

## 2024-10-02 DIAGNOSIS — M25.562 ACUTE PAIN OF LEFT KNEE: ICD-10-CM

## 2024-10-02 DIAGNOSIS — Z74.09 IMPAIRED FUNCTIONAL MOBILITY, BALANCE, GAIT, AND ENDURANCE: ICD-10-CM

## 2024-10-02 DIAGNOSIS — R29.898 WEAKNESS OF LEFT LOWER EXTREMITY: Primary | ICD-10-CM

## 2024-10-02 PROCEDURE — 97110 THERAPEUTIC EXERCISES: CPT | Mod: PN

## 2024-10-02 PROCEDURE — 97530 THERAPEUTIC ACTIVITIES: CPT | Mod: PN

## 2024-10-02 NOTE — PROGRESS NOTES
[facility] [place] [facility]   Physical Therapy Treatment Note     Name: Donilia Caicedo  Clinic Number: [id number]    Therapy Diagnosis:   Encounter Diagnoses   Name Primary?    Weakness of left lower extremity Yes    Impaired functional mobility, balance, gait, and endurance     Acute pain of left knee      Physician: Leonardo Davila MD    Visit Date: 10/2/2024    Physician Orders: PT Eval and Treat   Medical Diagnosis from Referral: M23.92 (ICD-10-CM) - Internal derangement of left knee   Evaluation Date: 9/6/2024  Authorization Period Expiration: 12/31/24  Plan of Care Expiration: 11/6/24  Visit # / Visits authorized: 1/ 20   Progress Note Due: 10/6/24  FOTO: 1/ 1     Precautions: Standard    Time In: 8:14  Time Out: 9:00  Total Billable Time: 46 minutes      SUBJECTIVE     Pt reports: that he is still waiting to get scheduled for his MRI. Pt reports that he was able to make it to his appointments last week due to other obligations and has tried to do his exercises at home, but it did not work out.   He was compliant with home exercise program.  Response to previous treatment: good  Functional change: Ongoing     Pain: 3/10  Location: left knee      OBJECTIVE     Objective Measures updated at progress report unless specified.     TREATMENT     Don received the treatments listed below:      Exercises performed: in bold     therapeutic activities to improve functional performance and functional mobility for 8  minutes, including:  Recumbent Bike     received therapeutic exercises to develop strength and ROM for 36 minutes including:  Quad sets 2x10, 3 sec   Seated marches 2x10  Alex stretch 3x30 sec   SL hip ABD 2x10  Prone hip EXT 3x10  Prone hamstring curl 3x10  Heel slides 2x10  Shuttle squat /c YTB around knees (2 black and 1 red band) 2x10  SLR 2x10 (attempted but discontinued on 9/6/24 d/t 9/10 pain) (today 9/16/24, 6/10 pain in the hip) (today attempted but d/c d/t 10/10  pain)    Not performed:  Standing hip EXT /c YTB 2x10  Seated knee extensions through partial or full range of motion   Clamshells or sidelying hip abduction   Supine hip extension (bridge exercises.)   Prone hamstring flexion 3x10 /c cupping    Standing terminal knee extension against band resistance.      neuromuscular re-education activities to improve: Balance, Proprioception, Posture, and muscles motor control for 00 minutes. The following activities were included:    received the following manual therapy techniques: Soft tissue Mobilization using cupping were applied to the: L hamstring and gastroc for 00 minutes, including:    PATIENT EDUCATION AND HOME EXERCISES     Home Exercises Provided and Patient Education Provided     Education provided:   -Pt was instructed on a new HEP. Pt verbalized and demonstrated understanding. Pt was educated to perform HEP at least 2x/day  -activity modifications to prevent aggravation of symptoms    Written Home Exercises Provided: Yes. Exercises were reviewed and Don was able to demonstrate them prior to the end of the session.  Don demonstrated good  understanding of the education provided. See EMR under Patient Instructions for exercises provided during therapy sessions    ASSESSMENT   Blanco is a 39 y.o. male who presents for his 2nd PT f/u with a medical diagnosis of internal derangement of the L knee. Pt was unable to attend his previous two appointments due to family obligations. Focus was placed on improving L knee ROM, as well as quad, hip flexor, and hip abductor strength, and hip flexor flexibility. Pt had extreme pain with supine SLR, so it was regressed to supine and seated marches to reduce the lever, which pt tolerated well with decreased symptoms. Pt tolerated all other therex well, though demonstrated difficulty due to L LE weakness secondary to pain. PT will continue to address the pt's impairments, including improving strength, mobility, and reducing  pain to enhance function and assist in returning to his PLOF.    Don Is progressing well towards his goals.   Pt prognosis is Good.     Pt will continue to benefit from skilled outpatient physical therapy to address the deficits listed in the problem list box on initial evaluation, provide pt/family education and to maximize pt's level of independence in the home and community environment.     Pt's spiritual, cultural and educational needs considered and pt agreeable to plan of care and goals.     Anticipated barriers to physical therapy: none     GOALS: Short Term Goals:  4 weeks  1.Report decreased in pain at worse less than  <   / =  5  /10  to increase tolerance for functional mobility.On going  2. Pt to improve L knee range of motion by 25% to allow for improved functional mobility to allow for improvement in IADLs. .On going  3. Increased L LE MMT 1/2 grade to increase tolerance for ADL and work activities.On going  4. Pt to tolerate HEP to improve ROM and independence with ADL's.On going     Long Term Goals: 8 weeks  1.Report decreased in pain at worse less than  <   / =  2  /10  to increase tolerance for functional mobility. On going  2.Pt to improve L knee range of motion by 75% to allow for improved functional mobility to allow for improvement in IADLs. On going  3.Increased L LE MMT 1 grade to increase tolerance for ADL and work activities.On going  4. Pt will score 85% or greater on FOTO knee survey  to demonstrate increase in LE function with every day tasks. On going  5. Pt to be Independent with HEP to improve ROM and independence with ADL's. On going    PLAN     Cont with current and progress as tolerated     June Duncan, PT, DPT

## 2024-10-03 ENCOUNTER — OFFICE VISIT (OUTPATIENT)
Dept: ORTHOPEDICS | Facility: CLINIC | Age: 39
End: 2024-10-03
Payer: MEDICARE

## 2024-10-03 ENCOUNTER — HOSPITAL ENCOUNTER (OUTPATIENT)
Dept: RADIOLOGY | Facility: HOSPITAL | Age: 39
Discharge: HOME | End: 2024-10-03
Attending: ORTHOPAEDIC SURGERY
Payer: MEDICARE

## 2024-10-03 VITALS — BODY MASS INDEX: 28.02 KG/M2 | HEIGHT: 73 IN | WEIGHT: 211.44 LBS

## 2024-10-03 DIAGNOSIS — M25.531 RIGHT WRIST PAIN: Primary | ICD-10-CM

## 2024-10-03 DIAGNOSIS — M25.531 RIGHT WRIST PAIN: ICD-10-CM

## 2024-10-03 PROCEDURE — 73110 X-RAY EXAM OF WRIST: CPT | Mod: 26,RT,, | Performed by: RADIOLOGY

## 2024-10-03 PROCEDURE — 73110 X-RAY EXAM OF WRIST: CPT | Mod: TC,RT

## 2024-10-03 PROCEDURE — 99204 OFFICE O/P NEW MOD 45 MIN: CPT | Mod: S$PBB,,, | Performed by: ORTHOPAEDIC SURGERY

## 2024-10-03 PROCEDURE — 99999 PR PBB SHADOW E&M-EST. PATIENT-LVL III: CPT | Mod: PBBFAC,,, | Performed by: ORTHOPAEDIC SURGERY

## 2024-10-03 PROCEDURE — 99213 OFFICE O/P EST LOW 20 MIN: CPT | Mod: PBBFAC,25 | Performed by: ORTHOPAEDIC SURGERY
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
## 2024-10-04 ENCOUNTER — CLINICAL SUPPORT (OUTPATIENT)
Dept: REHABILITATION | Facility: HOSPITAL | Age: 39
End: 2024-10-04
Payer: MEDICARE

## 2024-10-04 DIAGNOSIS — M25.562 ACUTE PAIN OF LEFT KNEE: ICD-10-CM

## 2024-10-04 DIAGNOSIS — R29.898 WEAKNESS OF LEFT LOWER EXTREMITY: Primary | ICD-10-CM

## 2024-10-04 DIAGNOSIS — Z74.09 IMPAIRED FUNCTIONAL MOBILITY, BALANCE, GAIT, AND ENDURANCE: ICD-10-CM

## 2024-10-04 PROCEDURE — 97110 THERAPEUTIC EXERCISES: CPT | Mod: PN

## 2024-10-04 PROCEDURE — 97530 THERAPEUTIC ACTIVITIES: CPT | Mod: PN

## 2024-10-04 PROCEDURE — 97140 MANUAL THERAPY 1/> REGIONS: CPT | Mod: PN

## 2024-10-04 NOTE — PROGRESS NOTES
[facility]   Physical Therapy Treatment Note     Name: Donilia Caicedo  Clinic Number: [id number]    Therapy Diagnosis:   Encounter Diagnoses   Name Primary?    Weakness of left lower extremity Yes    Impaired functional mobility, balance, gait, and endurance     Acute pain of left knee      Physician: Leonardo Davila MD    Visit Date: 10/4/2024    Physician Orders: PT Eval and Treat   Medical Diagnosis from Referral: M23.92 (ICD-10-CM) - Internal derangement of left knee   Evaluation Date: 9/6/2024  Authorization Period Expiration: 12/31/24  Plan of Care Expiration: 11/6/24  Visit # / Visits authorized: 1/ 20   Progress Note Due: 10/6/24  FOTO: 1/ 1     Precautions: Standard    Time In: 9:04  Time Out: 10:00  Total Billable Time: 56 minutes      SUBJECTIVE     Pt reports: he did not do much yesterday but has been hurting. Like today, he has to  the leg to place it on the bike because it is so painful.   He was compliant with home exercise program.  Response to previous treatment: good  Functional change: Ongoing     Pain: 7/10  Location: left knee      OBJECTIVE     Objective Measures updated at progress report unless specified.     TREATMENT     Don received the treatments listed below:      Exercises performed: in bold     therapeutic activities to improve functional performance and functional mobility for 10  minutes, including:  Recumbent Bike     received therapeutic exercises to develop strength and ROM for 31 minutes including:  Quad sets 2x10, 3 sec   Supine marches 3x10  SL hip ABD 2x10  Prone hip EXT 3x10  Prone hamstring curl 3x15 /c cupping   Shuttle squat /c YTB around knees (2 black band) 3x10  Step ups 12 in, 10 reps   Standing calf stretch 3x30 sec     Not performed:  Seated marches 2x10  Alex stretch 3x30 sec   Heel slides 2x10  Standing hip EXT /c YTB 2x10  Seated knee extensions through partial or full range of motion   Clamshells or sidelying  hip abduction   Supine hip extension (bridge exercises.)   Standing terminal knee extension against band resistance.    SLR 2x10 (attempted but discontinued on 9/6/24 d/t 9/10 pain) (today 9/16/24, 6/10 pain in the hip) (today attempted but d/c d/t 10/10 pain)    neuromuscular re-education activities to improve: Balance, Proprioception, Posture, and muscles motor control for 00 minutes. The following activities were included:    received the following manual therapy techniques: Soft tissue Mobilization using cupping were applied to the: L hamstring and gastroc for 15 minutes, including:   cupping with Prone hamstring flexion 3x15    PATIENT EDUCATION AND HOME EXERCISES     Home Exercises Provided and Patient Education Provided     Education provided:   -Pt was instructed on a new HEP. Pt verbalized and demonstrated understanding. Pt was educated to perform HEP at least 2x/day  -activity modifications to prevent aggravation of symptoms    Written Home Exercises Provided: Yes. Exercises were reviewed and Don was able to demonstrate them prior to the end of the session.  Don demonstrated good  understanding of the education provided. See EMR under Patient Instructions for exercises provided during therapy sessions    ASSESSMENT   Blanco is a 39 y.o. male who presents for his PT f/u with a medical diagnosis of internal derangement of the L knee. Pt started on the recumbent bike with reports of catching pain in the knee that alleviated after a minute once the knee loosened up. Cupping was performed on the L hamstrings and gastroc with AROM knee flexion, during which pt reported catching pain at the lateral hamstring and gastroc with tibia ER, but decreased pain with tibia in neutral rotation. Pt continues to present with decreased hip and knee strength, as evidenced by difficulty performing strengthening exercises, requiring intermittent rest breaks. SLRs are very painful, so they were regressed to supine marches,  which the pt tolerated well. During step-ups with L LE leading and R LE stepping down, pt reports feeling extreme tightness in his lateral hamstring and gastroc on the ascent, reporting 7/10 pain. Noted decreased eccentric control on the descent; pt reported feeling like his L knee will give out if he does not step down quickly with the R. PT will continue to address the pt's impairments, including improving strength, mobility, and reducing pain to enhance function and assist in returning to his PLOF. Pt will also benefit from further medical treatment, such as an MRI, to determine the exact source of pain, as he reports the knee sometimes roxanne on him.    Don Is progressing well towards his goals.   Pt prognosis is Good.     Pt will continue to benefit from skilled outpatient physical therapy to address the deficits listed in the problem list box on initial evaluation, provide pt/family education and to maximize pt's level of independence in the home and community environment.     Pt's spiritual, cultural and educational needs considered and pt agreeable to plan of care and goals.     Anticipated barriers to physical therapy: none     GOALS: Short Term Goals:  4 weeks  1.Report decreased in pain at worse less than  <   / =  5  /10  to increase tolerance for functional mobility.On going  2. Pt to improve L knee range of motion by 25% to allow for improved functional mobility to allow for improvement in IADLs. .On going  3. Increased L LE MMT 1/2 grade to increase tolerance for ADL and work activities.On going  4. Pt to tolerate HEP to improve ROM and independence with ADL's.On going     Long Term Goals: 8 weeks  1.Report decreased in pain at worse less than  <   / =  2  /10  to increase tolerance for functional mobility. On going  2.Pt to improve L knee range of motion by 75% to allow for improved functional mobility to allow for improvement in IADLs. On going  3.Increased L LE MMT 1 grade to increase  tolerance for ADL and work activities.On going  4. Pt will score 85% or greater on FOTO knee survey  to demonstrate increase in LE function with every day tasks. On going  5. Pt to be Independent with HEP to improve ROM and independence with ADL's. On going    PLAN     Cont with current and progress as tolerated     Estyeni Duncan, PT, DPT

## 2024-10-07 ENCOUNTER — PATIENT MESSAGE (OUTPATIENT)
Dept: ORTHOPEDICS | Facility: CLINIC | Age: 39
End: 2024-10-07
Payer: MEDICARE

## 2024-10-11 ENCOUNTER — HOSPITAL ENCOUNTER (OUTPATIENT)
Dept: RADIOLOGY | Facility: HOSPITAL | Age: 39
Discharge: HOME | End: 2024-10-11
Attending: ORTHOPAEDIC SURGERY
Payer: MEDICARE

## 2024-10-11 DIAGNOSIS — M25.531 RIGHT WRIST PAIN: ICD-10-CM

## 2024-10-11 PROCEDURE — 73221 MRI JOINT UPR EXTREM W/O DYE: CPT | Mod: 26,RT,, | Performed by: RADIOLOGY

## 2024-10-11 PROCEDURE — 73221 MRI JOINT UPR EXTREM W/O DYE: CPT | Mod: TC,RT

## 2024-10-18 ENCOUNTER — CLINICAL SUPPORT (OUTPATIENT)
Dept: REHABILITATION | Facility: HOSPITAL | Age: 39
End: 2024-10-18
Payer: MEDICARE

## 2024-10-18 DIAGNOSIS — M23.92 INTERNAL DERANGEMENT OF LEFT KNEE: Primary | ICD-10-CM

## 2024-10-18 PROCEDURE — 97110 THERAPEUTIC EXERCISES: CPT | Mod: PN,CQ

## 2024-10-18 NOTE — PROGRESS NOTES
[facility] OUTPATIENT THERAPY AND WELLNESS   Physical Therapy Treatment Note     Name: Don Caicedo  Clinic Number: [id number]    Therapy Diagnosis:   No diagnosis found.    Physician: Leonardo Davila MD    Visit Date: 10/18/2024    Physician Orders: PT Eval and Treat   Medical Diagnosis from Referral: M23.92 (ICD-10-CM) - Internal derangement of left knee   Evaluation Date: 9/6/2024  Authorization Period Expiration: 12/31/24  Plan of Care Expiration: 11/6/24  Visit # / Visits authorized: 4/ 20   Progress Note Due: 10/6/24  FOTO: 1/ 1     Precautions: Standard    Time In: 10:00 am   Time Out: 11:00 am  Total Billable Time: 56 minutes    PTA Visit: 1/5    SUBJECTIVE     Pt reports: He's sorry he missed his last appt.   He was told he had to go get his MRI but when he got there they told him they couldn't do it.      he did not do much yesterday but has been hurting. Like today, he has to  the leg to place it on the bike because it is so painful.   He was compliant with home exercise program.  Response to previous treatment: good  Functional change: Ongoing     Pain: 7/10  Location: left knee      OBJECTIVE     Objective Measures updated at progress report unless specified.     TREATMENT     Don received the treatments listed below:      Exercises performed: in bold     therapeutic activities to improve functional performance and functional mobility for 10  minutes, including:  Upright Bike 10' lvl 1    received therapeutic exercises to develop strength and ROM for 44 minutes including:  Quad sets +3x10, 3 sec   SL hip ABD 2x10  Prone hip EXT 3x10  Prone hamstring curl 3x15 /c cupping   Shuttle squat /c YTB around knees (2 black band) 3x10  Step ups 12 in, +2x10 reps   Standing calf stretch 3x30 sec     Not performed:  Seated marches 2x10  Alex stretch 3x30 sec   Heel slides 2x10  Standing hip EXT /c YTB 2x10  Seated knee extensions through partial or full range of motion   Clamshells or  sidelying hip abduction   Supine hip extension (bridge exercises.)   Standing terminal knee extension against band resistance.    SLR 2x10 (attempted but discontinued on 9/6/24 d/t 9/10 pain) (today 9/16/24, 6/10 pain in the hip) (today attempted but d/c d/t 10/10 pain)    neuromuscular re-education activities to improve: Balance, Proprioception, Posture, and muscles motor control for 00 minutes. The following activities were included:    received the following manual therapy techniques: Soft tissue Mobilization using cupping were applied to the: L hamstring and gastroc for 15 minutes, including:   cupping with Prone hamstring flexion 3x15    PATIENT EDUCATION AND HOME EXERCISES     Home Exercises Provided and Patient Education Provided     Education provided:   -Pt was instructed on a new HEP. Pt verbalized and demonstrated understanding. Pt was educated to perform HEP at least 2x/day  -activity modifications to prevent aggravation of symptoms    Written Home Exercises Provided: Yes. Exercises were reviewed and Don was able to demonstrate them prior to the end of the session.  Don demonstrated good  understanding of the education provided. See EMR under Patient Instructions for exercises provided during therapy sessions    ASSESSMENT   Mr. Caicedo presented to PT today reporting high levels of left knee and right hip pain.  Minimal progressions today to avoid increased levels of right hip/knee pain.  However, he was able to tolerate increased sets of quad sets and step ups.  He was most challenged with sidelying hip abd, reporting the pain to be specific to lateral left hip. He was moderately challenged with today's session but able to complete without moderate reports of increased pain.  Will continue to progress as tolerated.       Don Is progressing well towards his goals.   Pt prognosis is Good.     Pt will continue to benefit from skilled outpatient physical therapy to address the deficits listed in  the problem list box on initial evaluation, provide pt/family education and to maximize pt's level of independence in the home and community environment.     Pt's spiritual, cultural and educational needs considered and pt agreeable to plan of care and goals.     Anticipated barriers to physical therapy: none     GOALS: Short Term Goals:  4 weeks  1.Report decreased in pain at worse less than  <   / =  5  /10  to increase tolerance for functional mobility.On going  2. Pt to improve L knee range of motion by 25% to allow for improved functional mobility to allow for improvement in IADLs. .On going  3. Increased L LE MMT 1/2 grade to increase tolerance for ADL and work activities.On going  4. Pt to tolerate HEP to improve ROM and independence with ADL's.On going     Long Term Goals: 8 weeks  1.Report decreased in pain at worse less than  <   / =  2  /10  to increase tolerance for functional mobility. On going  2.Pt to improve L knee range of motion by 75% to allow for improved functional mobility to allow for improvement in IADLs. On going  3.Increased L LE MMT 1 grade to increase tolerance for ADL and work activities.On going  4. Pt will score 85% or greater on FOTO knee survey  to demonstrate increase in LE function with every day tasks. On going  5. Pt to be Independent with HEP to improve ROM and independence with ADL's. On going    PLAN     Cont with current and progress as tolerated     Mari Toshia, PTA

## 2024-10-19 ENCOUNTER — HOSPITAL ENCOUNTER (OUTPATIENT)
Dept: RADIOLOGY | Facility: HOSPITAL | Age: 39
Discharge: HOME | End: 2024-10-19
Attending: ORTHOPAEDIC SURGERY
Payer: MEDICARE

## 2024-10-19 DIAGNOSIS — M23.92 INTERNAL DERANGEMENT OF LEFT KNEE: ICD-10-CM

## 2024-10-19 PROCEDURE — 73721 MRI JNT OF LWR EXTRE W/O DYE: CPT | Mod: 26,LT,, | Performed by: RADIOLOGY

## 2024-10-19 PROCEDURE — 73721 MRI JNT OF LWR EXTRE W/O DYE: CPT | Mod: TC,LT

## 2024-10-21 ENCOUNTER — CLINICAL SUPPORT (OUTPATIENT)
Dept: REHABILITATION | Facility: HOSPITAL | Age: 39
End: 2024-10-21
Payer: MEDICARE

## 2024-10-21 DIAGNOSIS — R29.898 WEAKNESS OF LEFT LOWER EXTREMITY: Primary | ICD-10-CM

## 2024-10-21 DIAGNOSIS — M25.562 ACUTE PAIN OF LEFT KNEE: ICD-10-CM

## 2024-10-21 DIAGNOSIS — Z74.09 IMPAIRED FUNCTIONAL MOBILITY, BALANCE, GAIT, AND ENDURANCE: ICD-10-CM

## 2024-10-21 PROCEDURE — 97110 THERAPEUTIC EXERCISES: CPT | Mod: PN

## 2024-10-21 PROCEDURE — 97140 MANUAL THERAPY 1/> REGIONS: CPT | Mod: PN

## 2024-10-21 PROCEDURE — 97530 THERAPEUTIC ACTIVITIES: CPT | Mod: PN

## 2024-10-21 NOTE — PROGRESS NOTES
[facility]   Physical Therapy Treatment Note     Name: Don Caicedo  Clinic Number: [id number]    Therapy Diagnosis:   Encounter Diagnoses   Name Primary?    Weakness of left lower extremity Yes    Impaired functional mobility, balance, gait, and endurance     Acute pain of left knee        Physician: Leonardo Davila MD    Visit Date: 10/21/2024    Physician Orders: PT Eval and Treat   Medical Diagnosis from Referral: M23.92 (ICD-10-CM) - Internal derangement of left knee   Evaluation Date: 9/6/2024  Authorization Period Expiration: 12/31/24  Plan of Care Expiration: 11/6/24  Visit # / Visits authorized: 4/ 20   Progress Note Due: 10/6/24  FOTO: 1/ 1     Precautions: Standard    Time In: 10:08 am   Time Out: 11:04 am  Total Billable Time: 56 minutes    PTA Visit: 1/5    SUBJECTIVE     Pt reports: that he was able to get the MRI for his knee; however, no MRI was done for his L hip. Pt reports that the L hip continues to be bothersome, with pain worse than in the L knee.  He was compliant with home exercise program.  Response to previous treatment: good  Functional change: Ongoing     Pain: 7/10  Location: left knee and hip    OBJECTIVE     Objective Measures updated at progress report unless specified.     TREATMENT     Don received the treatments listed below:      Exercises performed: in bold     therapeutic activities to improve functional performance and functional mobility for 10  minutes, including:  Upright Bike 10' lvl 1    received therapeutic exercises to develop strength and ROM for 11 minutes including:  Quad sets +3x10, 3 sec   SL hip ABD 2x10  Prone hip EXT 3x10  Prone hamstring curl 3x15 /c cupping   Shuttle squat /c YTB around knees (2 black band) 3x10  Step ups 12 in, +2x10 reps   Standing calf stretch 3x30 sec     Not performed:  Seated marches 2x10  Alex stretch 3x30 sec   Heel slides 2x10  Standing hip EXT /c YTB 2x10  Seated knee extensions through  partial or full range of motion   Clamshells or sidelying hip abduction   Supine hip extension (bridge exercises.)   Standing terminal knee extension against band resistance.    SLR 2x10 (attempted but discontinued on 9/6/24 d/t 9/10 pain) (today 9/16/24, 6/10 pain in the hip) (today attempted but d/c d/t 10/10 pain)    neuromuscular re-education activities to improve: Balance, Proprioception, Posture, and muscles motor control for 00 minutes. The following activities were included:    received the following manual therapy techniques: Soft tissue Mobilization using cupping were applied to the: L hamstring and gastroc for 00 minutes, including:   cupping with Prone hamstring flexion 3x15    Pt cleared of contraindications and verbal and written consent acquired. Pt given option of copy of consent form. Pt educated on benefits and potential side effects of DN. DN for 15 minutes with pt in supine position with involved side L anterior hip. Hip flexors protocol with winding. Pt received 10 min E-stim at parameters of 2 hz and 250 us.     Patient provided written and verbal consent to receive functional dry needling at today's visit (see consent form scanned into chart). FDN performed to L hip flexors mm. FDN performed to reduce pain and muscle tension, promote blood flow, and improve ROM and function. Pt tolerated tx well without adverse effects. He was educated on what to expect following the procedure and He verbalized understanding.     PATIENT EDUCATION AND HOME EXERCISES     Home Exercises Provided and Patient Education Provided     Education provided:   -Pt was instructed on a new HEP. Pt verbalized and demonstrated understanding. Pt was educated to perform HEP at least 2x/day  -activity modifications to prevent aggravation of symptoms    Written Home Exercises Provided: Yes. Exercises were reviewed and Don was able to demonstrate them prior to the end of the session.  Don demonstrated good  understanding of  the education provided. See EMR under Patient Instructions for exercises provided during therapy sessions    ASSESSMENT   Mr. Caicedo presented to PT today reporting high levels of left knee and L hip pain, hip > knee. Approximately 20 minutes were spent assessing the pt's L hip. Hip PROM was assessed, and the pt demonstrated increased L hip pain with 90/90 hip IR and ER. A hip scour was also performed, which increased the pt's pain. L hip long-axis distraction, inferior glide, and lateral stretch were performed, and the pt reported feeling good during the mobilization. However, upon return, he experienced extreme pain. These findings indicate possible hip labral pathology. Therefore, the pt will benefit from further medical interventions to determine the source of his L hip pain and the best course of action. In the meantime, the pt will benefit from continued L LE strengthening to decrease pressure on his L knee and hip, improving his symptoms. It was noted today that the pt had an increased trigger point/taut band in the L rectus femoris, slightly below the L ASIS. Dry needling was introduced to reduce pain and muscle tension, promote blood flow, and improve ROM and function. Pt  did not have any side effects. only 2 needles were inserted shallow. The pt demonstrated good tolerance during the treatment, and no adverse reactions were noted afterward. He continued to be most challenged with supine SLR, reporting pain in the anterior hip. Therefore, SLR was discontinued. Will continue to progress as tolerated.    Don Is progressing well towards his goals.   Pt prognosis is Good.     Pt will continue to benefit from skilled outpatient physical therapy to address the deficits listed in the problem list box on initial evaluation, provide pt/family education and to maximize pt's level of independence in the home and community environment.     Pt's spiritual, cultural and educational needs considered and pt agreeable  to plan of care and goals.     Anticipated barriers to physical therapy: none     GOALS: Short Term Goals:  4 weeks  1.Report decreased in pain at worse less than  <   / =  5  /10  to increase tolerance for functional mobility.On going  2. Pt to improve L knee range of motion by 25% to allow for improved functional mobility to allow for improvement in IADLs. .On going  3. Increased L LE MMT 1/2 grade to increase tolerance for ADL and work activities.On going  4. Pt to tolerate HEP to improve ROM and independence with ADL's.On going     Long Term Goals: 8 weeks  1.Report decreased in pain at worse less than  <   / =  2  /10  to increase tolerance for functional mobility. On going  2.Pt to improve L knee range of motion by 75% to allow for improved functional mobility to allow for improvement in IADLs. On going  3.Increased L LE MMT 1 grade to increase tolerance for ADL and work activities.On going  4. Pt will score 85% or greater on FOTO knee survey  to demonstrate increase in LE function with every day tasks. On going  5. Pt to be Independent with HEP to improve ROM and independence with ADL's. On going    PLAN     Cont with current and progress as tolerated     Esthere Perla, PT, DPT and Guille Phelan, PT, DPT

## 2024-10-22 ENCOUNTER — OFFICE VISIT (OUTPATIENT)
Dept: ORTHOPEDICS | Facility: CLINIC | Age: 39
End: 2024-10-22
Payer: MEDICARE

## 2024-10-22 VITALS — BODY MASS INDEX: 28.02 KG/M2 | HEIGHT: 73 IN | WEIGHT: 211.44 LBS

## 2024-10-22 DIAGNOSIS — M76.892 HIP FLEXOR TENDINITIS, LEFT: ICD-10-CM

## 2024-10-22 DIAGNOSIS — M23.92 INTERNAL DERANGEMENT OF LEFT KNEE: Primary | ICD-10-CM

## 2024-10-22 DIAGNOSIS — M95.8 OSTEOCHONDRAL DEFECT: ICD-10-CM

## 2024-10-22 PROCEDURE — 99213 OFFICE O/P EST LOW 20 MIN: CPT | Mod: PBBFAC,PN | Performed by: ORTHOPAEDIC SURGERY

## 2024-10-22 PROCEDURE — 99213 OFFICE O/P EST LOW 20 MIN: CPT | Mod: S$PBB,,, | Performed by: ORTHOPAEDIC SURGERY

## 2024-10-22 PROCEDURE — 99999 PR PBB SHADOW E&M-EST. PATIENT-LVL III: CPT | Mod: PBBFAC,,, | Performed by: ORTHOPAEDIC SURGERY

## 2024-10-22 RX ORDER — HYDROCODONE BITARTRATE AND ACETAMINOPHEN 5; 325 MG/1; MG/1
1 TABLET ORAL EVERY 6 HOURS PRN
Qty: 20 TABLET | Refills: 0 | Status: SHIPPED | OUTPATIENT
Start: 2024-10-22
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
## 2024-10-22 NOTE — PROGRESS NOTES
EST PATIENT ORTHOPAEDIC: Knee    PRIMARY CARE PHYSICIAN: Khalif Michaels MD   REFERRING PROVIDER: No referring provider defined for this encounter.     ASSESSMENT & PLAN:    Impression:  Left Knee Mild Osteoarthritis, Primary    Left Knee OCD lesion  Left Knee internal derangement (medial mensicus)  Ilioposas tendonitis    Follow Up Plan: PRN    Non operative care:    Donilia Caicedo has physical exam evidence of above and wishes to pursue an non-operative care. I am recommending the following: MRI, PT, Medrol Dose pack    Patient comes in with a three-month history of acute on chronic knee pain.  He underwent left knee arthroscopy in 2016 at [facility].  I do not have records of that encounter.  He reports he had a cyst and some clean-up type procedure.  He was doing okay after then and then he began to develop more different pain.  No specific injury.  He has pain in the posterior and lateral aspects of his knee. Has also medial joint line and tenderness over his medial femoral condyle.  He reports intermittent instability, intermittent effusions, continued daily pain.  He is unable to tolerate anti-inflammatories secondary to being von Willebrand's disease.  X-rays demonstrate some mild arthritis.  His exam he is pretty tender over his biceps femoris tendon and his posterior lateral knee capsule either in the hamstrings or calf insertion.  He has some lateral joint line pain.  He has some crepitus with range of motion that is painful.  This clinical picture which was confusing was my rationale for obtaining an MRI for developing a targeted source.  Since placing that MRI started to develop some iliopsoas pain and difficulty elevating his leg on that side as well.  He isn't therapy for this.  Dry needling as effective for that.  He was put on a Medrol Dosepak as well which helped for a few days and he had a return of his pain.  The MRI showed concern for a osteochondral defect in his medial femoral  condyle along with a horizontal cleavage tear of his medial meniscus.  He does have pain in the sources but he also pain in other sources.  While I think he may benefit from intra-articular injection I think this would be better done with the sports physicians who if fails that injections can help develop a better treatment plan regarding cartilage preservation procedures if needed and if indicated.  For his acute pain today give him a couple Norco as to help get him to next appointment.  From my standpoint I could think about unicompartmental knee arthroplasty but I think that is extreme relative to his constellation of symptoms and his age.    The patient has been ordered:  Pain Prescription    CONSULTS:   None    ACTIVE PROBLEM LIST  Patient Active Problem List   Diagnosis    Laceration of left eyebrow    Chronic low back pain    Chondromalacia of left knee    Impaired range of motion of shoulder    Right shoulder pain    Posture imbalance    Effusion of right knee    Calf swelling    Chronic right shoulder pain    Chronic pain of right knee    Depressive disorder    Herniated lumbar intervertebral disc    Obsessive-compulsive disorder    Acute appendicitis    Von Willebrand disease    Weakness of left lower extremity    Impaired functional mobility, balance, gait, and endurance    Acute pain of left knee           SUBJECTIVE    CHIEF COMPLAINT: Knee Pain    HPI:   Don Ambrocio Caicedo is a 39 y.o. male here for evaluation and management of left knee pain. There is not a specific incident that brought about this pain. he has had progressive problems with the knee(s) starting 3 months ago but is now progressing to interfere with activities which include: walking, participating in family activities, and enjoying hobbies    Currently the pain in the joint is rated at moderate with activity. The pain is constant and is located in the knee, located laterally and located posterior. The pain is described as  aching, severe, sharp, and stiffness. Relieving factors include rest, over the counter medication , and repositioning.     There is associated Clicking and Popping.     Don Ambrocio Caicedo has no additional complaints.     10/22/24: Here for MRI follow up.  Medrol Dosepak to help for a few days.  Has been compliant with physical therapy.  He has started develop some iliopsoas tendinitis on the left hip.    PROGRESSIVE SYMPTOMS:  Pain effecting living situation  Pain limiting ability to stay fit and healthy    FUNCTIONAL STATUS:   Participate in recreational activities     PREVIOUS TREATMENTS:  Medical: Tylenol  Physical Therapy: Activities Modified   Previous Orthopaedic Surgery: Left knee arthroscopsy     REVIEW OF SYSTEMS:  PAIN ASSESSMENT:  See HPI.  MUSCULOSKELETAL: See HPI.  OTHER 10 point review of systems is negative except as stated in HPI above    PAST MEDICAL HISTORY   has a past medical history of Anxiety, Chronic back pain, Depression, Insomnia, Migraine headache, and Seasonal allergies.     PAST SURGICAL HISTORY   has a past surgical history that includes Knee arthroscopy; Circumcision; Laparoscopic appendectomy (N/A, 06/13/2024); and Lipoma resection.     FAMILY HISTORY  family history includes Hypertension in his father and paternal grandfather.     SOCIAL HISTORY   reports that he has quit smoking. His smoking use included cigarettes. He has never used smokeless tobacco. He reports current alcohol use. He reports current drug use. Drug: Marijuana.     ALLERGIES   Review of patient's allergies indicates:   Allergen Reactions    Flexeril [cyclobenzaprine] Hives and Swelling    Aspirin      Other Reaction(s): bleeding risk    Nsaids (non-steroidal anti-inflammatory drug)      Other Reaction(s): bleeding risk        MEDICATIONS  Current Outpatient Medications on File Prior to Visit   Medication Sig Dispense Refill    acetaminophen (TYLENOL) 500 MG tablet Take 2 tablets (1,000 mg total) by mouth  "2 (two) times daily as needed for Pain. 60 tablet 0    cyanocobalamin, vitamin B-12, (VITAMIN B-12) 50 mcg tablet Take 50 mcg by mouth once daily.      methylPREDNISolone (MEDROL DOSEPACK) 4 mg tablet use as directed 1 each 0     No current facility-administered medications on file prior to visit.          PHYSICAL EXAM   height is 6' 1" (1.854 m) and weight is 95.9 kg (211 lb 6.7 oz).   Body mass index is 27.89 kg/m².      All other systems deferred.  GENERAL:  No acute distress  HABITUS: Normal  GAIT: Antalgic  SKIN: Normal  and No erythema, warmth, fluctuance     KNEE EXAM:    left:   Effusion: Small joint effusion  TTP: Yes over Lateral Joint Line and biceps femoris and posterolateral knee capsule, medial joint line   Yes crepitus with passive knee ROM  Passive ROM: Extension 0, Flexion 120 (limited by pain) - pain with terminal extension and flexion  No pain with manipulation of patella  Stable to varus/valgus stress. No increased laxity to anterior/posterior drawer testing  positive Meg's test  No pain with IR/ER rotation of the hip  5/5 strength in knee flexion and extension, ankle plantarflexion and dorsiflexion  Neurovascular Status: Sensation intact to light touch in Sural, Saphenous, SPN, DPN, Tibial nerve distribution  2+ pulse DP/PT, normal capillary refill, foot has normal warmth    DATA:  Diagnostic tests reviewed for today's visit:     4v of the knee reveal Mild degenerative changes of the Medial, Lateral, and Patellofemoral compartment. There is evidence of advanced osteoarthritis changes with Osteophyte formation and Joint space narrowing. The limb is in netural alignment. The patella is tracking midline.    MRI report and images of his left knee were reviewed which showed  1. Full-thickness cartilage defect over the medial femoral condyle.  2. Horizontal tear of medial meniscus.    "

## 2024-10-23 ENCOUNTER — OFFICE VISIT (OUTPATIENT)
Dept: SPORTS MEDICINE | Facility: CLINIC | Age: 39
End: 2024-10-23
Payer: MEDICARE

## 2024-10-23 ENCOUNTER — HOSPITAL ENCOUNTER (OUTPATIENT)
Dept: RADIOLOGY | Facility: HOSPITAL | Age: 39
Discharge: HOME | End: 2024-10-23
Attending: ORTHOPAEDIC SURGERY
Payer: MEDICARE

## 2024-10-23 VITALS
SYSTOLIC BLOOD PRESSURE: 117 MMHG | WEIGHT: 211.63 LBS | BODY MASS INDEX: 28.05 KG/M2 | HEIGHT: 73 IN | HEART RATE: 55 BPM | DIASTOLIC BLOOD PRESSURE: 74 MMHG

## 2024-10-23 DIAGNOSIS — M54.9 DORSALGIA, UNSPECIFIED: ICD-10-CM

## 2024-10-23 DIAGNOSIS — M54.16 LUMBAR RADICULOPATHY: ICD-10-CM

## 2024-10-23 DIAGNOSIS — G57.22 DISORDER OF LEFT FEMORAL NERVE: ICD-10-CM

## 2024-10-23 DIAGNOSIS — M23.92 INTERNAL DERANGEMENT OF LEFT KNEE: ICD-10-CM

## 2024-10-23 DIAGNOSIS — M54.16 LUMBAR RADICULOPATHY: Primary | ICD-10-CM

## 2024-10-23 PROCEDURE — 99999 PR PBB SHADOW E&M-EST. PATIENT-LVL III: CPT | Mod: PBBFAC,,, | Performed by: ORTHOPAEDIC SURGERY

## 2024-10-23 PROCEDURE — 99213 OFFICE O/P EST LOW 20 MIN: CPT | Mod: PBBFAC,25 | Performed by: ORTHOPAEDIC SURGERY

## 2024-10-23 PROCEDURE — 99204 OFFICE O/P NEW MOD 45 MIN: CPT | Mod: S$PBB,,, | Performed by: ORTHOPAEDIC SURGERY

## 2024-10-23 PROCEDURE — 72100 X-RAY EXAM L-S SPINE 2/3 VWS: CPT | Mod: 26,,, | Performed by: RADIOLOGY

## 2024-10-23 PROCEDURE — 72100 X-RAY EXAM L-S SPINE 2/3 VWS: CPT | Mod: TC

## 2024-10-24 ENCOUNTER — TELEPHONE (OUTPATIENT)
Dept: NEUROLOGY | Facility: CLINIC | Age: 39
End: 2024-10-24
Payer: MEDICARE

## 2024-11-04 ENCOUNTER — CLINICAL SUPPORT (OUTPATIENT)
Dept: REHABILITATION | Facility: HOSPITAL | Age: 39
End: 2024-11-04
Payer: MEDICARE

## 2024-11-04 DIAGNOSIS — M25.562 ACUTE PAIN OF LEFT KNEE: ICD-10-CM

## 2024-11-04 DIAGNOSIS — R29.898 WEAKNESS OF LEFT LOWER EXTREMITY: Primary | ICD-10-CM

## 2024-11-04 DIAGNOSIS — Z74.09 IMPAIRED FUNCTIONAL MOBILITY, BALANCE, GAIT, AND ENDURANCE: ICD-10-CM

## 2024-11-04 PROCEDURE — 97530 THERAPEUTIC ACTIVITIES: CPT | Mod: PN

## 2024-11-04 PROCEDURE — 97110 THERAPEUTIC EXERCISES: CPT | Mod: PN

## 2024-11-04 NOTE — PROGRESS NOTES
[facility]   Physical Therapy Treatment Note     Name: Don Caicedo  Clinic Number: [id number]    Therapy Diagnosis:   Encounter Diagnoses   Name Primary?    Weakness of left lower extremity Yes    Impaired functional mobility, balance, gait, and endurance     Acute pain of left knee        Physician: Leonardo Davila MD    Visit Date: 11/4/2024    Physician Orders: PT Eval and Treat   Medical Diagnosis from Referral: M23.92 (ICD-10-CM) - Internal derangement of left knee   Evaluation Date: 9/6/2024  Authorization Period Expiration: 12/31/24  Plan of Care Expiration: 11/6/24, POC updated to 1/6/25  Visit # / Visits authorized: 4/ 20   Progress Note Due: 12/6/24  FOTO: 1/ 1     Precautions: Standard    Time In: 10:08 am   Time Out: 11:04 am  Total Billable Time: 56 minutes    PTA Visit: 1/5    SUBJECTIVE     Pt reports: that the L hip continues to be bothersome, with pain worse than in the L knee. He states he does not have any pain currently, as he has been using a TENS unit at home, but notes hell likely experience pain once he starts moving the hip and knee and doing his exercises.  He was compliant with home exercise program.  Response to previous treatment: good  Functional change: Ongoing     Pain: 0/10  Location: left knee and hip    OBJECTIVE     Objective Measures updated at progress report unless specified.   Range of Motion:   Knee Left active (initial eval) Left active (11/4/24) Left Passive (initial eval)   Flexion 125 140 NT   Extension 3 0 0 but painful and muscle guarding          Lower Extremity Strength   Right LE  initial eval  Left LE Initial eval  11/4/24   Knee extension: 5/5 Knee extension: 4-/5, pain in lateral meniscus area, and lateral quad tendon 4-/5, 5/10 knee pain   Knee flexion: 4+/5 Knee flexion: 3/5, pain at biceps femoris insertion 3/5, 10/10 posterior knee pain   Hip flexion: 4+/5 Hip flexion: 3+/5 4/5, 6/10 anterior hip pain   Hip  extension:  4+/5 Hip extension: 3+/5 4/5   Hip abduction: 4-/5 Hip abduction: 3/5 3+/5, 7/10 anterior hip pain      TREATMENT     Don received the treatments listed below:      Exercises performed: in bold     therapeutic activities to improve functional performance and functional mobility for 10  minutes, including:  Upright Bike 10' lvl 1    received therapeutic exercises to develop strength and ROM for 36 minutes including:  Quad sets +3x10, 3 sec   SL hip ABD 2x10  Prone hip EXT 3x10  Prone hamstring curl 3x15 /c cupping   Shuttle squat /c YTB around knees (2 black band) 3x10  Step ups 12 in, +2x10 reps   Standing calf stretch 3x30 sec   Bridges 3x10  Seated marches 2x10    Not performed:  Alex stretch 3x30 sec   Heel slides 2x10  Standing hip EXT /c YTB 2x10  Seated knee extensions through partial or full range of motion   Clamshells or sidelying hip abduction   Supine hip extension (bridge exercises.)   Standing terminal knee extension against band resistance.    SLR 2x10 (attempted but discontinued on 9/6/24 d/t 9/10 pain) (today 9/16/24, 6/10 pain in the hip) (today attempted but d/c d/t 10/10 pain)    neuromuscular re-education activities to improve: Balance, Proprioception, Posture, and muscles motor control for 00 minutes. The following activities were included:    received the following manual therapy techniques: Soft tissue Mobilization using cupping were applied to the: L hamstring and gastroc for 00 minutes, including:   cupping with Prone hamstring flexion 3x15    Pt cleared of contraindications and verbal and written consent acquired. Pt given option of copy of consent form. Pt educated on benefits and potential side effects of DN. DN for 00 minutes with pt in supine position with involved side L anterior hip. Hip flexors protocol with winding. Pt received 00 min E-stim at parameters of 2 hz and 250 us.     Patient provided written and verbal consent to receive functional dry needling at  today's visit (see consent form scanned into chart). FDN performed to L hip flexors mm. FDN performed to reduce pain and muscle tension, promote blood flow, and improve ROM and function. Pt tolerated tx well without adverse effects. He was educated on what to expect following the procedure and He verbalized understanding.     PATIENT EDUCATION AND HOME EXERCISES     Home Exercises Provided and Patient Education Provided     Education provided:   -Pt was instructed on a new HEP. Pt verbalized and demonstrated understanding. Pt was educated to perform HEP at least 2x/day  -activity modifications to prevent aggravation of symptoms    Written Home Exercises Provided: Yes. Exercises were reviewed and Don was able to demonstrate them prior to the end of the session.  Don demonstrated good  understanding of the education provided. See EMR under Patient Instructions for exercises provided during therapy sessions    ASSESSMENT   Mr. Caicedo was re-assessed today, with 3/5 STGs and 1/5 LTGs met, indicating improvements in L LE strength, L knee AROM, and independence with HEP since the start of care. However, increased L knee and hip pain continue to be limiting factors. This visit solis his 7th session since starting PT on 9/6/24, with only minimal progress due to frequent missed visits and limited compliance with his HEP due to family obligations. He tolerated treatment well today with no adverse effects. Pt reported pain and a catching sensation in the anterior hip with palpation during seated marches. Pt will benefit from further medical interventions to determine the source of his L hip pain and the best course of action. SLR will continue to be withheld until pt can tolerate it. Pt was encouraged to attend all future PT appointments to improve strength, ROM, and pain. Will continue to progress as tolerated. Pt is not yet appropriate for discharge at this time.    Don Is progressing well towards his goals.   Pt  prognosis is Good.     Pt will continue to benefit from skilled outpatient physical therapy to address the deficits listed in the problem list box on initial evaluation, provide pt/family education and to maximize pt's level of independence in the home and community environment.     Pt's spiritual, cultural and educational needs considered and pt agreeable to plan of care and goals.     Anticipated barriers to physical therapy: none     GOALS: Short Term Goals:  4 weeks  1.Report decreased in pain at worse less than  <   / =  5  /10  to increase tolerance for functional mobility.On going  2. Pt to improve L knee range of motion by 25% to allow for improved functional mobility to allow for improvement in IADLs. MET (11/4/24)  3. Increased L LE MMT 1/2 grade to increase tolerance for ADL and work activities.MET (11/4/24)  4. Pt to tolerate HEP to improve ROM and independence with ADL's. MET (11/4/24)     Long Term Goals: 8 weeks  1.Report decreased in pain at worse less than  <   / =  2  /10  to increase tolerance for functional mobility. On going  2.Pt to improve L knee range of motion by 75% to allow for improved functional mobility to allow for improvement in IADLs. On going  3.Increased L LE MMT 1 grade to increase tolerance for ADL and work activities.On going  4. Pt will score 85% or greater on FOTO knee survey  to demonstrate increase in LE function with every day tasks. On going  5. Pt to be Independent with HEP to improve ROM and independence with ADL's. MET (11/4/24)    PLAN     Cont with current and progress as tolerated     June Duncan, PT, DPT

## 2024-11-17 NOTE — PROGRESS NOTES
[facility] OUTPATIENT THERAPY AND WELLNESS   Physical Therapy Treatment Note     Name: Don Caicedo  Clinic Number: [id number]    Therapy Diagnosis:   No diagnosis found.      Physician: Leonardo Davila MD    Visit Date: 11/18/2024    Physician Orders: PT Eval and Treat   Medical Diagnosis from Referral: M23.92 (ICD-10-CM) - Internal derangement of left knee   Evaluation Date: 9/6/2024  Authorization Period Expiration: 12/31/24  Plan of Care Expiration: 11/6/24, POC updated to 1/6/25  Visit # / Visits authorized: 4/ 20   Progress Note Due: 12/6/24  FOTO: 1/ 1     Precautions: Standard    Time In: 10:08 am   Time Out: 11:04 am  Total Billable Time: 56 minutes    PTA Visit: 1/5    SUBJECTIVE     Pt reports: that the L hip continues to be bothersome, with pain worse than in the L knee. He states he does not have any pain currently, as he has been using a TENS unit at home, but notes hell likely experience pain once he starts moving the hip and knee and doing his exercises.  He was compliant with home exercise program.  Response to previous treatment: good  Functional change: Ongoing     Pain: 0/10  Location: left knee and hip    OBJECTIVE     Objective Measures updated at progress report unless specified.   Range of Motion:   Knee Left active (initial eval) Left active (11/4/24) Left Passive (initial eval)   Flexion 125 140 NT   Extension 3 0 0 but painful and muscle guarding          Lower Extremity Strength   Right LE  initial eval  Left LE Initial eval  11/4/24   Knee extension: 5/5 Knee extension: 4-/5, pain in lateral meniscus area, and lateral quad tendon 4-/5, 5/10 knee pain   Knee flexion: 4+/5 Knee flexion: 3/5, pain at biceps femoris insertion 3/5, 10/10 posterior knee pain   Hip flexion: 4+/5 Hip flexion: 3+/5 4/5, 6/10 anterior hip pain   Hip extension:  4+/5 Hip extension: 3+/5 4/5   Hip abduction: 4-/5 Hip abduction: 3/5 3+/5, 7/10 anterior hip pain      TREATMENT     Don received the  treatments listed below:      Exercises performed: in bold     therapeutic activities to improve functional performance and functional mobility for 10  minutes, including:  Upright Bike 10' lvl 1    received therapeutic exercises to develop strength and ROM for 36 minutes including:  Quad sets +3x10, 3 sec   SL hip ABD 2x10  Prone hip EXT 3x10  Prone hamstring curl 3x15 /c cupping   Shuttle squat /c YTB around knees (2 black band) 3x10  Step ups 12 in, +2x10 reps   Standing calf stretch 3x30 sec   Bridges 3x10  Seated marches 2x10    Not performed:  Alex stretch 3x30 sec   Heel slides 2x10  Standing hip EXT /c YTB 2x10  Seated knee extensions through partial or full range of motion   Clamshells or sidelying hip abduction   Supine hip extension (bridge exercises.)   Standing terminal knee extension against band resistance.    SLR 2x10 (attempted but discontinued on 9/6/24 d/t 9/10 pain) (today 9/16/24, 6/10 pain in the hip) (today attempted but d/c d/t 10/10 pain)    neuromuscular re-education activities to improve: Balance, Proprioception, Posture, and muscles motor control for 00 minutes. The following activities were included:    received the following manual therapy techniques: Soft tissue Mobilization using cupping were applied to the: L hamstring and gastroc for 00 minutes, including:   cupping with Prone hamstring flexion 3x15    Pt cleared of contraindications and verbal and written consent acquired. Pt given option of copy of consent form. Pt educated on benefits and potential side effects of DN. DN for 00 minutes with pt in supine position with involved side L anterior hip. Hip flexors protocol with winding. Pt received 00 min E-stim at parameters of 2 hz and 250 us.     Patient provided written and verbal consent to receive functional dry needling at today's visit (see consent form scanned into chart). FDN performed to L hip flexors mm. FDN performed to reduce pain and muscle tension, promote blood flow,  and improve ROM and function. Pt tolerated tx well without adverse effects. He was educated on what to expect following the procedure and He verbalized understanding.     PATIENT EDUCATION AND HOME EXERCISES     Home Exercises Provided and Patient Education Provided     Education provided:   -Pt was instructed on a new HEP. Pt verbalized and demonstrated understanding. Pt was educated to perform HEP at least 2x/day  -activity modifications to prevent aggravation of symptoms    Written Home Exercises Provided: Yes. Exercises were reviewed and Don was able to demonstrate them prior to the end of the session.  Don demonstrated good  understanding of the education provided. See EMR under Patient Instructions for exercises provided during therapy sessions    ASSESSMENT   Mr. Caicedo was re-assessed today, with 3/5 STGs and 1/5 LTGs met, indicating improvements in L LE strength, L knee AROM, and independence with HEP since the start of care. However, increased L knee and hip pain continue to be limiting factors. This visit solis his 7th session since starting PT on 9/6/24, with only minimal progress due to frequent missed visits and limited compliance with his HEP due to family obligations. He tolerated treatment well today with no adverse effects. Pt reported pain and a catching sensation in the anterior hip with palpation during seated marches. Pt will benefit from further medical interventions to determine the source of his L hip pain and the best course of action. SLR will continue to be withheld until pt can tolerate it. Pt was encouraged to attend all future PT appointments to improve strength, ROM, and pain. Will continue to progress as tolerated. Pt is not yet appropriate for discharge at this time.    Don Is progressing well towards his goals.   Pt prognosis is Good.     Pt will continue to benefit from skilled outpatient physical therapy to address the deficits listed in the problem list box on initial  evaluation, provide pt/family education and to maximize pt's level of independence in the home and community environment.     Pt's spiritual, cultural and educational needs considered and pt agreeable to plan of care and goals.     Anticipated barriers to physical therapy: none     GOALS: Short Term Goals:  4 weeks  1.Report decreased in pain at worse less than  <   / =  5  /10  to increase tolerance for functional mobility.On going  2. Pt to improve L knee range of motion by 25% to allow for improved functional mobility to allow for improvement in IADLs. MET (11/4/24)  3. Increased L LE MMT 1/2 grade to increase tolerance for ADL and work activities.MET (11/4/24)  4. Pt to tolerate HEP to improve ROM and independence with ADL's. MET (11/4/24)     Long Term Goals: 8 weeks  1.Report decreased in pain at worse less than  <   / =  2  /10  to increase tolerance for functional mobility. On going  2.Pt to improve L knee range of motion by 75% to allow for improved functional mobility to allow for improvement in IADLs. On going  3.Increased L LE MMT 1 grade to increase tolerance for ADL and work activities.On going  4. Pt will score 85% or greater on FOTO knee survey  to demonstrate increase in LE function with every day tasks. On going  5. Pt to be Independent with HEP to improve ROM and independence with ADL's. MET (11/4/24)    PLAN     Cont with current and progress as tolerated     Mari Toshia, PTA

## 2024-11-18 ENCOUNTER — NURSE TRIAGE (OUTPATIENT)
Dept: ADMINISTRATIVE | Facility: CLINIC | Age: 39
End: 2024-11-18
Payer: MEDICARE

## 2024-11-18 ENCOUNTER — CLINICAL SUPPORT (OUTPATIENT)
Dept: REHABILITATION | Facility: HOSPITAL | Age: 39
End: 2024-11-18
Payer: MEDICARE

## 2024-11-18 DIAGNOSIS — M25.562 ACUTE PAIN OF LEFT KNEE: ICD-10-CM

## 2024-11-18 DIAGNOSIS — Z74.09 IMPAIRED FUNCTIONAL MOBILITY, BALANCE, GAIT, AND ENDURANCE: ICD-10-CM

## 2024-11-18 DIAGNOSIS — R29.898 WEAKNESS OF LEFT LOWER EXTREMITY: Primary | ICD-10-CM

## 2024-11-18 PROCEDURE — 97112 NEUROMUSCULAR REEDUCATION: CPT | Mod: PN

## 2024-11-18 PROCEDURE — 97530 THERAPEUTIC ACTIVITIES: CPT | Mod: PN

## 2024-11-18 NOTE — TELEPHONE ENCOUNTER
"Pt calling because he is having blurred vision to his left eye for the past 2 days. Today, feeling "funny." When he turns his head he is having trouble seeing. No recent eye injury or foreign bodies that he knows off. Had some swelling a day ago but rinsed with eyewash and it improved. Redness on the inside at the bottom. Reports severe pain to eye as well.    Dispo- go to ED now. Pt advised and VU. Post eval callback reasons discussed.  Reason for Disposition   Severe eye pain    Additional Information   Negative: Complete loss of vision in one or both eyes    Protocols used: Vision Loss or Change-A-OH    "

## 2024-11-18 NOTE — PROGRESS NOTES
[facility]   Physical Therapy Treatment Note     Name: Don Caicedo  Clinic Number: [id number]    Therapy Diagnosis:   Encounter Diagnoses   Name Primary?    Weakness of left lower extremity Yes    Impaired functional mobility, balance, gait, and endurance     Acute pain of left knee        Physician: Leonardo Davila MD    Visit Date: 11/18/2024    Physician Orders: PT Eval and Treat   Medical Diagnosis from Referral: M23.92 (ICD-10-CM) - Internal derangement of left knee   Evaluation Date: 9/6/2024  Authorization Period Expiration: 12/31/24  Plan of Care Expiration: 11/6/24, POC updated to 1/6/25  Visit # / Visits authorized: 7/ 20   Progress Note Due: 12/6/24  FOTO: 1/ 1     Precautions: Standard    Time In: 10:00 am   Time Out: 10:53  am  Total Billable Time: 53 minutes    PTA Visit: 1/5    SUBJECTIVE     Pt reports: that  he sustain injury on L groin area at work. He states that he missed therapy due to injury. He still feels L groin still pop and painful.   He was compliant with home exercise program.  Response to previous treatment: good  Functional change: Ongoing     Pain: 0/10  Location: left knee and hip    OBJECTIVE     Objective Measures updated at progress report unless specified.   Range of Motion:   Knee Left active (initial eval) Left active (11/4/24) Left Passive (initial eval)   Flexion 125 140 NT   Extension 3 0 0 but painful and muscle guarding          Lower Extremity Strength   Right LE  initial eval  Left LE Initial eval  11/4/24   Knee extension: 5/5 Knee extension: 4-/5, pain in lateral meniscus area, and lateral quad tendon 4-/5, 5/10 knee pain   Knee flexion: 4+/5 Knee flexion: 3/5, pain at biceps femoris insertion 3/5, 10/10 posterior knee pain   Hip flexion: 4+/5 Hip flexion: 3+/5 4/5, 6/10 anterior hip pain   Hip extension:  4+/5 Hip extension: 3+/5 4/5   Hip abduction: 4-/5 Hip abduction: 3/5 3+/5, 7/10 anterior hip pain      TREATMENT      Don received the treatments listed below:      Exercises performed: in bold     therapeutic activities to improve functional performance and functional mobility for 10  minutes, including:  Upright Bike 10' lvl 1    received therapeutic exercises to develop strength and ROM for 43  minutes including:  Education   Quad sets +3x10, 3 sec   SL hip ABD 2x10  Prone hip EXT 3x10  Prone hamstring curl 3x15 /c cupping   Shuttle squat /c YTB around knees (2 black band) 3x10  Step ups 12 in, +2x10 reps   Standing calf stretch 3x30 sec   Bridges 3x10  Seated marches 2x10    Not performed:  Alex stretch 3x30 sec   Heel slides 2x10  Standing hip EXT /c YTB 2x10  Seated knee extensions through partial or full range of motion   Clamshells or sidelying hip abduction   Supine hip extension (bridge exercises.)   Standing terminal knee extension against band resistance.    SLR 2x10 (attempted but discontinued on 9/6/24 d/t 9/10 pain) (today 9/16/24, 6/10 pain in the hip) (today attempted but d/c d/t 10/10 pain)    neuromuscular re-education activities to improve: Balance, Proprioception, Posture, and muscles motor control for 00 minutes. The following activities were included:    received the following manual therapy techniques: Soft tissue Mobilization using cupping were applied to the: L hamstring and gastroc for 00 minutes, including:         PATIENT EDUCATION AND HOME EXERCISES     Home Exercises Provided and Patient Education Provided     Education provided:   -Pt was instructed on a new HEP. Pt verbalized and demonstrated understanding. Pt was educated to perform HEP at least 2x/day  -activity modifications to prevent aggravation of symptoms    Written Home Exercises Provided: Yes. Exercises were reviewed and Don was able to demonstrate them prior to the end of the session.  Don demonstrated good  understanding of the education provided. See EMR under Patient Instructions for exercises provided during therapy  sessions    ASSESSMENT     Pt presented to therapy with increase pain L groin region due ot sustain injury at work. Pt still have L groin pa jurgen nd pop during certain hip motion. Pt was somewhat limited in therapy today due to pain. Pt required minor v/c's to perform exeircses. Pt might benefit from return to [facility] for further evaluation of L hip pain and other parts of body pain. Plan to cont with POC.     Note: 11-4-24  Mr. Caicedo was re-assessed today, with 3/5 STGs and 1/5 LTGs met, indicating improvements in L LE strength, L knee AROM, and independence with HEP since the start of care. However, increased L knee and hip pain continue to be limiting factors. This visit solis his 7th session since starting PT on 9/6/24, with only minimal progress due to frequent missed visits and limited compliance with his HEP due to family obligations. He tolerated treatment well today with no adverse effects. Pt reported pain and a catching sensation in the anterior hip with palpation during seated marches. Pt will benefit from further medical interventions to determine the source of his L hip pain and the best course of action. SLR will continue to be withheld until pt can tolerate it. Pt was encouraged to attend all future PT appointments to improve strength, ROM, and pain. Will continue to progress as tolerated. Pt is not yet appropriate for discharge at this time.    Don Is progressing well towards his goals.   Pt prognosis is Good.     Pt will continue to benefit from skilled outpatient physical therapy to address the deficits listed in the problem list box on initial evaluation, provide pt/family education and to maximize pt's level of independence in the home and community environment.     Pt's spiritual, cultural and educational needs considered and pt agreeable to plan of care and goals.     Anticipated barriers to physical therapy: none     GOALS: Short Term Goals:  4 weeks  1.Report decreased in pain at worse less  than  <   / =  5  /10  to increase tolerance for functional mobility.On going  2. Pt to improve L knee range of motion by 25% to allow for improved functional mobility to allow for improvement in IADLs. MET (11/4/24)  3. Increased L LE MMT 1/2 grade to increase tolerance for ADL and work activities.MET (11/4/24)  4. Pt to tolerate HEP to improve ROM and independence with ADL's. MET (11/4/24)     Long Term Goals: 8 weeks  1.Report decreased in pain at worse less than  <   / =  2  /10  to increase tolerance for functional mobility. On going  2.Pt to improve L knee range of motion by 75% to allow for improved functional mobility to allow for improvement in IADLs. On going  3.Increased L LE MMT 1 grade to increase tolerance for ADL and work activities.On going  4. Pt will score 85% or greater on FOTO knee survey  to demonstrate increase in LE function with every day tasks. On going  5. Pt to be Independent with HEP to improve ROM and independence with ADL's. MET (11/4/24)    PLAN     Cont with current and progress as tolerated     Guille Porter, PT, DPT

## 2025-02-05 ENCOUNTER — OFFICE VISIT (OUTPATIENT)
Dept: OPTOMETRY | Facility: CLINIC | Age: 40
End: 2025-02-05
Payer: MEDICARE

## 2025-02-05 DIAGNOSIS — H10.13 ALLERGIC CONJUNCTIVITIS OF BOTH EYES: ICD-10-CM

## 2025-02-05 DIAGNOSIS — H43.393 VISUAL FLOATERS, BILATERAL: ICD-10-CM

## 2025-02-05 DIAGNOSIS — H04.123 DRY EYE SYNDROME OF BOTH EYES: Primary | ICD-10-CM

## 2025-02-05 PROCEDURE — 99999 PR PBB SHADOW E&M-EST. PATIENT-LVL II: CPT | Mod: PBBFAC,,, | Performed by: OPTOMETRIST

## 2025-02-05 PROCEDURE — 99212 OFFICE O/P EST SF 10 MIN: CPT | Mod: PBBFAC,PO | Performed by: OPTOMETRIST

## 2025-02-05 PROCEDURE — 92004 COMPRE OPH EXAM NEW PT 1/>: CPT | Mod: S$PBB,,, | Performed by: OPTOMETRIST

## 2025-02-05 NOTE — PROGRESS NOTES
LAUREN YOUNGBLOOD: about 3 yrs. Ago elsewhere  Chief complaint (CC): Patient is here for annual eye exam today.  Patient   had been seen by and eye doctor in September because he had gotten a piece   of grass in [place]. Patient is till having blurry vision off and on at a   distance and near.  Patient also has a FBS OU.  Eyes also burn, water and   feel irritated.  Patient has seasonal allergies and is allergic to gras   and some trees. Patient uses an eye wash once or twice a week.  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  Patient wears safety glasses.    Patient has had floaters OU for the past years, no flashes.  Glasses? -  Contacts? -  H/o eye surgery, injections or laser: -  H/o eye injury: FB's OU  Known eye conditions? -  Family h/o eye conditions? -  Eye gtts? Eye wash      (-) Flashes (+)  Floaters (-) Mucous   (+)  Tearing (+) Itching (+) Burning   (-) Headaches (+) Eye Pain/discomfort (+) Irritation   (-)  Redness (-) Double vision (+) Blurry vision    Diabetic? -  A1c? -      Last edited by Tamara Greco on 2/5/2025 10:43 AM.            Assessment /Plan     For exam results, see Encounter Report.      Dry eye syndrome of both eyes  Recommend iVizia, Systane Ultra or Refresh Optive BID-TID OU to aid with symptoms of dry eyes.    Allergic conjunctivitis of both eyes  Recommend Zaditor or Alaway bid OU and cool compresses to help soothe itching. Patient advised to RTC if condition gets worse.     Visual floaters, bilateral  No e/o h/b/t 360 degrees OU. Monitor for worsening of symptoms or S/Sx of RD.       No SRx indicated. RTc 1 year.

## 2025-02-21 DIAGNOSIS — Z00.00 ENCOUNTER FOR MEDICARE ANNUAL WELLNESS EXAM: ICD-10-CM

## 2025-04-05 ENCOUNTER — HOSPITAL ENCOUNTER (EMERGENCY)
Facility: HOSPITAL | Age: 40
Discharge: HOME | End: 2025-04-05
Attending: STUDENT IN AN ORGANIZED HEALTH CARE EDUCATION/TRAINING PROGRAM
Payer: MEDICARE

## 2025-04-05 VITALS
DIASTOLIC BLOOD PRESSURE: 70 MMHG | WEIGHT: 230 LBS | HEART RATE: 72 BPM | SYSTOLIC BLOOD PRESSURE: 132 MMHG | OXYGEN SATURATION: 99 % | BODY MASS INDEX: 30.34 KG/M2 | RESPIRATION RATE: 18 BRPM | TEMPERATURE: 98 F

## 2025-04-05 DIAGNOSIS — M25.561 RIGHT KNEE PAIN: ICD-10-CM

## 2025-04-05 DIAGNOSIS — M25.531 RIGHT WRIST PAIN: ICD-10-CM

## 2025-04-05 PROCEDURE — 99284 EMERGENCY DEPT VISIT MOD MDM: CPT | Mod: 25

## 2025-04-05 PROCEDURE — 25000003 PHARM REV CODE 250: Performed by: STUDENT IN AN ORGANIZED HEALTH CARE EDUCATION/TRAINING PROGRAM

## 2025-04-05 RX ORDER — OXYCODONE AND ACETAMINOPHEN 5; 325 MG/1; MG/1
1 TABLET ORAL EVERY 6 HOURS PRN
Qty: 12 TABLET | Refills: 0 | Status: SHIPPED | OUTPATIENT
Start: 2025-04-05

## 2025-04-05 RX ORDER — IBUPROFEN 600 MG/1
600 TABLET ORAL
Status: DISCONTINUED | OUTPATIENT
Start: 2025-04-05 | End: 2025-04-05 | Stop reason: HOSPADM

## 2025-04-05 RX ORDER — ACETAMINOPHEN 325 MG/1
650 TABLET ORAL
Status: COMPLETED | OUTPATIENT
Start: 2025-04-05 | End: 2025-04-05

## 2025-04-05 RX ADMIN — ACETAMINOPHEN 650 MG: 325 TABLET ORAL at 05:04

## 2025-04-05 NOTE — ED TRIAGE NOTES
Don Ambrocio Caicedo is a 40 y.o male to the ED c/o right knee pain and right wrist pain s/p fall 3 days ago.  States that he landed on right side.  Denies LOC.

## 2025-04-05 NOTE — ED PROVIDER NOTES
Encounter Date: 4/5/2025       History     Chief Complaint   Patient presents with    Knee Pain     Pt fell on to rt knee 3 days ago and it has been swelling and having shooting pains down leg, pt ambulatory to triage also had pain to rt wrist     40-year-old male presents for right knee pain and right wrist pain after mechanical fall, down the stairs few days ago.  He is able to ambulate with a right knee but he has pain in the outside of the right knee and swelling of the right knee.  He also has swelling of the right wrist.      Review of patient's allergies indicates:   Allergen Reactions    Flexeril [cyclobenzaprine] Hives and Swelling    Aspirin      Other Reaction(s): bleeding risk    Nsaids (non-steroidal anti-inflammatory drug)      Other Reaction(s): bleeding risk     Past Medical History:   Diagnosis Date    Anxiety     Chronic back pain     Depression     Insomnia     Migraine headache     Seasonal allergies      Past Surgical History:   Procedure Laterality Date    CIRCUMCISION      KNEE ARTHROSCOPY      LAPAROSCOPIC APPENDECTOMY N/A 06/13/2024    Procedure: APPENDECTOMY, LAPAROSCOPIC;  Surgeon: Memo Sanders MD;  Location: [facility];  Service: General;  Laterality: N/A;    LIPOMA RESECTION       Family History   Problem Relation Name Age of Onset    Hypertension Father      Hypertension Paternal Grandfather       Social History[1]  Review of Systems   Musculoskeletal:  Positive for arthralgias.       Physical Exam     Initial Vitals [04/05/25 0414]   BP Pulse Resp Temp SpO2   138/76 79 18 98.2 °F (36.8 °C) 98 %      MAP       --         Physical Exam    Nursing note and vitals reviewed.  Constitutional: He appears well-developed and well-nourished.   HENT:   Head: Normocephalic and atraumatic. Mouth/Throat: Oropharynx is clear and moist.   Eyes: Conjunctivae and EOM are normal. Pupils are equal, round, and reactive to light.   Neck: No thyromegaly present.   Normal range of motion.  Cardiovascular:   Normal rate, regular rhythm and intact distal pulses.           Pulmonary/Chest: Breath sounds normal. No respiratory distress. He has no wheezes.   Abdominal: Abdomen is soft. Bowel sounds are normal. He exhibits no distension. There is no abdominal tenderness.   Musculoskeletal:         General: Tenderness present. No edema. Normal range of motion.      Cervical back: Normal range of motion.      Comments: Right lateral knee tender to palpation, right medial wrist tender to palpation     Neurological: He is alert and oriented to person, place, and time. He has normal strength. No cranial nerve deficit.   Skin: Skin is warm and dry. No rash noted.   Psychiatric: He has a normal mood and affect. His behavior is normal. Thought content normal.         ED Course   Procedures  Labs Reviewed - No data to display       Imaging Results              X-Ray Knee 3 View Right (In process)                      X-Ray Wrist Complete Right (In process)                      Medications   ibuprofen tablet 600 mg (600 mg Oral Not Given 4/5/25 7121)   acetaminophen tablet 650 mg (650 mg Oral Given 4/5/25 5833)     Medical Decision Making  40-year-old male presents for right wrist pain and right knee pain after mechanical fall 2 days ago.  He is able to ambulate on the right knee, x-ray without evidence of fracture, doubt occult fracture.  Right wrist has mild tenderness to palpation x-ray without evidence of fracture or dislocation.  Symptoms controlled with Tylenol and ibuprofen.  Stable for discharge with outpatient sports Medicine follow-up and return precautions for worsening symptoms.    Amount and/or Complexity of Data Reviewed  Radiology: ordered.    Risk  OTC drugs.  Prescription drug management.                                      Clinical Impression:  Final diagnoses:  [M25.561] Right knee pain  [M25.531] Right wrist pain          ED Disposition Condition    Discharge Stable          ED Prescriptions    None        Follow-up Information       Follow up With Specialties Details Why Contact Info    Khalif Michaels MD Family Medicine, Sports Medicine Call on 4/7/2025 To set up a follow-up appointment, To recheck today's symptoms [address] [facility] [address]  [phone]      [facility], Sports Medicine Call on 4/7/2025 To set up a follow-up appointment, To recheck today's symptoms [address]  [address]  [facility] [address]  [phone]                   [1]   Social History  Tobacco Use    Smoking status: Former     Types: Cigarettes    Smokeless tobacco: Never   Substance Use Topics    Alcohol use: Yes     Comment: social    Drug use: Yes     Types: Marijuana        Alex Medina MD  04/05/25 0510

## 2025-05-01 ENCOUNTER — PATIENT MESSAGE (OUTPATIENT)
Dept: ORTHOPEDICS | Facility: CLINIC | Age: 40
End: 2025-05-01
Payer: MEDICARE

## (undated) DEVICE — TROCAR ENDOPATH XCEL 5X100MM

## (undated) DEVICE — KIT ANTIFOG

## (undated) DEVICE — CLIPPER BLADE MOD 4406 (CAREF)

## (undated) DEVICE — ELECTRODE REM PLYHSV RETURN 9

## (undated) DEVICE — BLADE SURG CARBON STEEL SZ11

## (undated) DEVICE — SOL IRR SOD CHL .9% POUR

## (undated) DEVICE — SUT PDS II 0 CT VIL MONO 36

## (undated) DEVICE — NDL HYPO REG 25G X 1 1/2

## (undated) DEVICE — COVER OVERHEAD SURG LT BLUE

## (undated) DEVICE — APPLICATOR CHLORAPREP ORN 26ML

## (undated) DEVICE — BLANKET UPPER BODY 78.7X29.9IN

## (undated) DEVICE — STRIP MEDI WND CLSR 1/2X4IN

## (undated) DEVICE — SUT VICRYL PLUS 4-0 P3 18IN

## (undated) DEVICE — PACK ENDOSCOPY GENERAL

## (undated) DEVICE — Device

## (undated) DEVICE — GLOVE BIOGEL 7.5

## (undated) DEVICE — TRAY FOLEY 16FR INFECTION CONT

## (undated) DEVICE — SUT VICRYL+ 27 UR-6 VIOL

## (undated) DEVICE — SYR 10CC LUER LOCK

## (undated) DEVICE — TROCAR KII BLLN 12MM 10CM

## (undated) DEVICE — SHEARS HARMONIC CRVD TIP 36CM

## (undated) DEVICE — SUPPORT ULNA NERVE PROTECTOR

## (undated) DEVICE — TUBING INSUFFLATION 10